# Patient Record
Sex: MALE | Race: WHITE | NOT HISPANIC OR LATINO | URBAN - METROPOLITAN AREA
[De-identification: names, ages, dates, MRNs, and addresses within clinical notes are randomized per-mention and may not be internally consistent; named-entity substitution may affect disease eponyms.]

---

## 2019-12-02 ENCOUNTER — EMERGENCY (EMERGENCY)
Facility: HOSPITAL | Age: 74
LOS: 1 days | Discharge: ANOTHER HOSPITAL | End: 2019-12-02
Attending: EMERGENCY MEDICINE | Admitting: EMERGENCY MEDICINE
Payer: MEDICARE

## 2019-12-02 VITALS
DIASTOLIC BLOOD PRESSURE: 62 MMHG | OXYGEN SATURATION: 95 % | HEART RATE: 75 BPM | SYSTOLIC BLOOD PRESSURE: 132 MMHG | RESPIRATION RATE: 16 BRPM

## 2019-12-02 VITALS
HEART RATE: 72 BPM | OXYGEN SATURATION: 94 % | HEIGHT: 67 IN | TEMPERATURE: 98 F | RESPIRATION RATE: 18 BRPM | WEIGHT: 184.09 LBS | DIASTOLIC BLOOD PRESSURE: 74 MMHG | SYSTOLIC BLOOD PRESSURE: 135 MMHG

## 2019-12-02 DIAGNOSIS — Z98.89 OTHER SPECIFIED POSTPROCEDURAL STATES: Chronic | ICD-10-CM

## 2019-12-02 PROCEDURE — 99284 EMERGENCY DEPT VISIT MOD MDM: CPT | Mod: 25

## 2019-12-02 PROCEDURE — 99284 EMERGENCY DEPT VISIT MOD MDM: CPT

## 2019-12-02 PROCEDURE — 74176 CT ABD & PELVIS W/O CONTRAST: CPT

## 2019-12-02 PROCEDURE — 74176 CT ABD & PELVIS W/O CONTRAST: CPT | Mod: 26

## 2019-12-02 NOTE — ED PROVIDER NOTE - PATIENT PORTAL LINK FT
You can access the FollowMyHealth Patient Portal offered by [facility] by registering at the following website: [email or web address]. By joining [facility]’s FollowMyHealth portal, you will also be able to view your health information using other applications (apps) compatible with our system.

## 2019-12-02 NOTE — ED PROVIDER NOTE - PHYSICAL EXAMINATION
Gen:  alert, awake, no acute distress  HEENT:  atraumatic head, airway clear, pupils equal and round  CV:  rrr, nl S1, S2, no m/r/g  Pulm:  lungs CTA b/l  Abd: s/nt/nd, +BS, no stool in rectal vault  Ext:  moving all extremities  Neuro:  grossly intact, no focal deficits  Skin:  clear, dry, intact  Psych: AOx3, normal affect, no apparent risk to self or others

## 2019-12-02 NOTE — ED ADULT NURSE REASSESSMENT NOTE - NS ED NURSE REASSESS COMMENT FT1
Spoke with pt daughter in regards to pt plan of care and status, pt daughter verbalized understanding of information. Pt awaiting CT results at this time. Will continue to monitor.

## 2019-12-02 NOTE — ED PROVIDER NOTE - OBJECTIVE STATEMENT
pt sent from [facility] for 10 day h/o inability to move his bowels.  pt at [facility] because of R foot osteomyelitis.  pt denies any vomiting, is passing gas, was started on lactulose and milk of magnesia this evening without any BM yet.  pt denies having any fevers or chills. reports good appetite.  SNF notes state a concern that abdomen was "rigid".

## 2019-12-02 NOTE — ED ADULT NURSE NOTE - OBJECTIVE STATEMENT
Pt is alert, brought to the ER from [facility] due to no bowel movement for 10 days despite laxative and softener. Denies nausea and vomiting.

## 2019-12-02 NOTE — ED ADULT NURSE REASSESSMENT NOTE - NS ED NURSE REASSESS COMMENT FT1
Pt came in to the ER with on going Vancomycin 1 Gm IV drip via dial a flow thru PICC Line. on the left arm.

## 2019-12-02 NOTE — ED PROVIDER NOTE - NSFOLLOWUPINSTRUCTIONS_ED_ALL_ED_FT
-- Follow up with your primary care physician in 48 hours.    -- Start Golytle as prescribed.    -- Return to the ER for worsening or persistent symptoms, and/or ANY NEW OR CONCERNING SYMPTOMS.    -- If you have difficulty following up, please call: [phone] to obtain a [facility] doctor or specialist who takes your insurance in your area. -- Follow up with your primary care physician in 48 hours.    -- Start Gavilyte as prescribed.    -- Return to the ER for worsening or persistent symptoms, and/or ANY NEW OR CONCERNING SYMPTOMS.    -- If you have difficulty following up, please call: [phone] to obtain a [facility] doctor or specialist who takes your insurance in your area.

## 2019-12-02 NOTE — ED ADULT NURSE NOTE - PSH
History of appendectomy  17 years ago  History of right knee surgery  " Removal of right knee tumor" ,  1959

## 2019-12-02 NOTE — ED ADULT NURSE NOTE - PMH
Anemia    Carpal tunnel syndrome on both sides    Diabetic peripheral neuropathy    History of pancreatitis  ~ 1990, 1992  History of ventral hernia    Hyperlipidemia    Hypertension    Obesity (BMI 30-39.9)    Radial styloid tenosynovitis    Sleep apnea    Spinal stenosis of lumbar region    Type 2 diabetes mellitus

## 2019-12-07 DIAGNOSIS — R10.9 UNSPECIFIED ABDOMINAL PAIN: ICD-10-CM

## 2020-01-01 ENCOUNTER — INPATIENT (INPATIENT)
Facility: HOSPITAL | Age: 75
LOS: 8 days | Discharge: NURSING, REHAB OR LONG-TERM CARE | DRG: 871 | End: 2020-12-11
Attending: HOSPITALIST | Admitting: STUDENT IN AN ORGANIZED HEALTH CARE EDUCATION/TRAINING PROGRAM
Payer: MEDICARE

## 2020-01-01 ENCOUNTER — TRANSCRIPTION ENCOUNTER (OUTPATIENT)
Age: 75
End: 2020-01-01

## 2020-01-01 VITALS
DIASTOLIC BLOOD PRESSURE: 72 MMHG | WEIGHT: 138.89 LBS | RESPIRATION RATE: 20 BRPM | TEMPERATURE: 97 F | HEART RATE: 93 BPM | SYSTOLIC BLOOD PRESSURE: 131 MMHG | OXYGEN SATURATION: 98 %

## 2020-01-01 VITALS
DIASTOLIC BLOOD PRESSURE: 70 MMHG | HEART RATE: 95 BPM | TEMPERATURE: 99 F | OXYGEN SATURATION: 95 % | SYSTOLIC BLOOD PRESSURE: 117 MMHG | RESPIRATION RATE: 18 BRPM

## 2020-01-01 DIAGNOSIS — I10 ESSENTIAL (PRIMARY) HYPERTENSION: ICD-10-CM

## 2020-01-01 DIAGNOSIS — M48.00 SPINAL STENOSIS, SITE UNSPECIFIED: ICD-10-CM

## 2020-01-01 DIAGNOSIS — J96.01 ACUTE RESPIRATORY FAILURE WITH HYPOXIA: ICD-10-CM

## 2020-01-01 DIAGNOSIS — G45.9 TRANSIENT CEREBRAL ISCHEMIC ATTACK, UNSPECIFIED: ICD-10-CM

## 2020-01-01 DIAGNOSIS — J18.9 PNEUMONIA, UNSPECIFIED ORGANISM: ICD-10-CM

## 2020-01-01 DIAGNOSIS — Z29.9 ENCOUNTER FOR PROPHYLACTIC MEASURES, UNSPECIFIED: ICD-10-CM

## 2020-01-01 DIAGNOSIS — I26.99 OTHER PULMONARY EMBOLISM WITHOUT ACUTE COR PULMONALE: ICD-10-CM

## 2020-01-01 DIAGNOSIS — E78.5 HYPERLIPIDEMIA, UNSPECIFIED: ICD-10-CM

## 2020-01-01 DIAGNOSIS — E11.9 TYPE 2 DIABETES MELLITUS WITHOUT COMPLICATIONS: ICD-10-CM

## 2020-01-01 DIAGNOSIS — K21.9 GASTRO-ESOPHAGEAL REFLUX DISEASE WITHOUT ESOPHAGITIS: ICD-10-CM

## 2020-01-01 DIAGNOSIS — Z98.89 OTHER SPECIFIED POSTPROCEDURAL STATES: Chronic | ICD-10-CM

## 2020-01-01 LAB
-  COAGULASE NEGATIVE STAPHYLOCOCCUS: SIGNIFICANT CHANGE UP
A1C WITH ESTIMATED AVERAGE GLUCOSE RESULT: 6 % — HIGH (ref 4–5.6)
ALBUMIN SERPL ELPH-MCNC: 2.4 G/DL — LOW (ref 3.3–5)
ALBUMIN SERPL ELPH-MCNC: 2.5 G/DL — LOW (ref 3.3–5)
ALBUMIN SERPL ELPH-MCNC: 2.7 G/DL — LOW (ref 3.3–5)
ALBUMIN SERPL ELPH-MCNC: 2.8 G/DL — LOW (ref 3.3–5)
ALBUMIN SERPL ELPH-MCNC: 3.6 G/DL — SIGNIFICANT CHANGE UP (ref 3.3–5)
ALP SERPL-CCNC: 59 U/L — SIGNIFICANT CHANGE UP (ref 40–120)
ALP SERPL-CCNC: 60 U/L — SIGNIFICANT CHANGE UP (ref 40–120)
ALP SERPL-CCNC: 62 U/L — SIGNIFICANT CHANGE UP (ref 40–120)
ALP SERPL-CCNC: 65 U/L — SIGNIFICANT CHANGE UP (ref 40–120)
ALP SERPL-CCNC: 70 U/L — SIGNIFICANT CHANGE UP (ref 40–120)
ALP SERPL-CCNC: 75 U/L — SIGNIFICANT CHANGE UP (ref 40–120)
ALP SERPL-CCNC: 92 U/L — SIGNIFICANT CHANGE UP (ref 40–120)
ALT FLD-CCNC: 135 U/L — HIGH (ref 12–78)
ALT FLD-CCNC: 190 U/L — HIGH (ref 12–78)
ALT FLD-CCNC: 435 U/L — HIGH (ref 12–78)
ALT FLD-CCNC: 50 U/L — SIGNIFICANT CHANGE UP (ref 12–78)
ALT FLD-CCNC: 59 U/L — SIGNIFICANT CHANGE UP (ref 12–78)
ALT FLD-CCNC: 77 U/L — SIGNIFICANT CHANGE UP (ref 12–78)
ALT FLD-CCNC: 95 U/L — HIGH (ref 12–78)
ANION GAP SERPL CALC-SCNC: 2 MMOL/L — LOW (ref 5–17)
ANION GAP SERPL CALC-SCNC: 3 MMOL/L — LOW (ref 5–17)
ANION GAP SERPL CALC-SCNC: 3 MMOL/L — LOW (ref 5–17)
ANION GAP SERPL CALC-SCNC: 4 MMOL/L — LOW (ref 5–17)
ANION GAP SERPL CALC-SCNC: 4 MMOL/L — LOW (ref 5–17)
ANION GAP SERPL CALC-SCNC: 5 MMOL/L — SIGNIFICANT CHANGE UP (ref 5–17)
ANION GAP SERPL CALC-SCNC: 5 MMOL/L — SIGNIFICANT CHANGE UP (ref 5–17)
ANION GAP SERPL CALC-SCNC: 6 MMOL/L — SIGNIFICANT CHANGE UP (ref 5–17)
ANION GAP SERPL CALC-SCNC: 7 MMOL/L — SIGNIFICANT CHANGE UP (ref 5–17)
ANION GAP SERPL CALC-SCNC: 9 MMOL/L — SIGNIFICANT CHANGE UP (ref 5–17)
ANISOCYTOSIS BLD QL: SLIGHT — SIGNIFICANT CHANGE UP
APPEARANCE UR: CLEAR — SIGNIFICANT CHANGE UP
APTT BLD: 24.8 SEC — LOW (ref 27.5–35.5)
APTT BLD: 25.4 SEC — LOW (ref 27.5–35.5)
AST SERPL-CCNC: 15 U/L — SIGNIFICANT CHANGE UP (ref 15–37)
AST SERPL-CCNC: 16 U/L — SIGNIFICANT CHANGE UP (ref 15–37)
AST SERPL-CCNC: 197 U/L — HIGH (ref 15–37)
AST SERPL-CCNC: 20 U/L — SIGNIFICANT CHANGE UP (ref 15–37)
AST SERPL-CCNC: 22 U/L — SIGNIFICANT CHANGE UP (ref 15–37)
AST SERPL-CCNC: 22 U/L — SIGNIFICANT CHANGE UP (ref 15–37)
AST SERPL-CCNC: 34 U/L — SIGNIFICANT CHANGE UP (ref 15–37)
BASE EXCESS BLDA CALC-SCNC: 13.4 MMOL/L — HIGH (ref -2–2)
BASE EXCESS BLDA CALC-SCNC: 8.7 MMOL/L — HIGH (ref -2–2)
BASOPHILS # BLD AUTO: 0.01 K/UL — SIGNIFICANT CHANGE UP (ref 0–0.2)
BASOPHILS # BLD AUTO: 0.01 K/UL — SIGNIFICANT CHANGE UP (ref 0–0.2)
BASOPHILS # BLD AUTO: 0.02 K/UL — SIGNIFICANT CHANGE UP (ref 0–0.2)
BASOPHILS # BLD AUTO: 0.02 K/UL — SIGNIFICANT CHANGE UP (ref 0–0.2)
BASOPHILS NFR BLD AUTO: 0.1 % — SIGNIFICANT CHANGE UP (ref 0–2)
BASOPHILS NFR BLD AUTO: 0.2 % — SIGNIFICANT CHANGE UP (ref 0–2)
BILIRUB DIRECT SERPL-MCNC: 0.4 MG/DL — HIGH (ref 0.05–0.2)
BILIRUB INDIRECT FLD-MCNC: 0.4 MG/DL — SIGNIFICANT CHANGE UP (ref 0.2–1)
BILIRUB SERPL-MCNC: 0.6 MG/DL — SIGNIFICANT CHANGE UP (ref 0.2–1.2)
BILIRUB SERPL-MCNC: 0.6 MG/DL — SIGNIFICANT CHANGE UP (ref 0.2–1.2)
BILIRUB SERPL-MCNC: 0.7 MG/DL — SIGNIFICANT CHANGE UP (ref 0.2–1.2)
BILIRUB SERPL-MCNC: 0.8 MG/DL — SIGNIFICANT CHANGE UP (ref 0.2–1.2)
BILIRUB UR-MCNC: NEGATIVE — SIGNIFICANT CHANGE UP
BLOOD GAS COMMENTS ARTERIAL: SIGNIFICANT CHANGE UP
BLOOD GAS COMMENTS, VENOUS: SIGNIFICANT CHANGE UP
BLOOD GAS COMMENTS, VENOUS: SIGNIFICANT CHANGE UP
BUN SERPL-MCNC: 10 MG/DL — SIGNIFICANT CHANGE UP (ref 7–23)
BUN SERPL-MCNC: 11 MG/DL — SIGNIFICANT CHANGE UP (ref 7–23)
BUN SERPL-MCNC: 16 MG/DL — SIGNIFICANT CHANGE UP (ref 7–23)
BUN SERPL-MCNC: 24 MG/DL — HIGH (ref 7–23)
BUN SERPL-MCNC: 52 MG/DL — HIGH (ref 7–23)
BUN SERPL-MCNC: 55 MG/DL — HIGH (ref 7–23)
BUN SERPL-MCNC: 57 MG/DL — HIGH (ref 7–23)
BUN SERPL-MCNC: 63 MG/DL — HIGH (ref 7–23)
BUN SERPL-MCNC: 8 MG/DL — SIGNIFICANT CHANGE UP (ref 7–23)
BUN SERPL-MCNC: 9 MG/DL — SIGNIFICANT CHANGE UP (ref 7–23)
CALCIUM SERPL-MCNC: 7.4 MG/DL — LOW (ref 8.5–10.1)
CALCIUM SERPL-MCNC: 7.7 MG/DL — LOW (ref 8.5–10.1)
CALCIUM SERPL-MCNC: 7.9 MG/DL — LOW (ref 8.5–10.1)
CALCIUM SERPL-MCNC: 8.1 MG/DL — LOW (ref 8.5–10.1)
CALCIUM SERPL-MCNC: 8.2 MG/DL — LOW (ref 8.5–10.1)
CALCIUM SERPL-MCNC: 8.3 MG/DL — LOW (ref 8.5–10.1)
CALCIUM SERPL-MCNC: 8.4 MG/DL — LOW (ref 8.5–10.1)
CALCIUM SERPL-MCNC: 8.5 MG/DL — SIGNIFICANT CHANGE UP (ref 8.5–10.1)
CALCIUM SERPL-MCNC: 8.6 MG/DL — SIGNIFICANT CHANGE UP (ref 8.5–10.1)
CALCIUM SERPL-MCNC: 8.8 MG/DL — SIGNIFICANT CHANGE UP (ref 8.5–10.1)
CHLORIDE SERPL-SCNC: 100 MMOL/L — SIGNIFICANT CHANGE UP (ref 96–108)
CHLORIDE SERPL-SCNC: 100 MMOL/L — SIGNIFICANT CHANGE UP (ref 96–108)
CHLORIDE SERPL-SCNC: 95 MMOL/L — LOW (ref 96–108)
CHLORIDE SERPL-SCNC: 96 MMOL/L — SIGNIFICANT CHANGE UP (ref 96–108)
CHLORIDE SERPL-SCNC: 97 MMOL/L — SIGNIFICANT CHANGE UP (ref 96–108)
CHLORIDE SERPL-SCNC: 98 MMOL/L — SIGNIFICANT CHANGE UP (ref 96–108)
CHLORIDE SERPL-SCNC: 99 MMOL/L — SIGNIFICANT CHANGE UP (ref 96–108)
CK MB BLD-MCNC: 10 % — HIGH (ref 0–3.5)
CK MB BLD-MCNC: 10 % — HIGH (ref 0–3.5)
CK MB BLD-MCNC: 12.9 % — HIGH (ref 0–3.5)
CK MB CFR SERPL CALC: 1.8 NG/ML — SIGNIFICANT CHANGE UP (ref 0–3.6)
CK MB CFR SERPL CALC: 1.9 NG/ML — SIGNIFICANT CHANGE UP (ref 0–3.6)
CK MB CFR SERPL CALC: 2 NG/ML — SIGNIFICANT CHANGE UP (ref 0–3.6)
CK MB CFR SERPL CALC: 3.1 NG/ML — SIGNIFICANT CHANGE UP (ref 0–3.6)
CK SERPL-CCNC: 14 U/L — LOW (ref 26–308)
CK SERPL-CCNC: 20 U/L — LOW (ref 26–308)
CK SERPL-CCNC: 31 U/L — SIGNIFICANT CHANGE UP (ref 26–308)
CK SERPL-CCNC: 47 U/L — SIGNIFICANT CHANGE UP (ref 26–308)
CO2 SERPL-SCNC: 32 MMOL/L — HIGH (ref 22–31)
CO2 SERPL-SCNC: 32 MMOL/L — HIGH (ref 22–31)
CO2 SERPL-SCNC: 33 MMOL/L — HIGH (ref 22–31)
CO2 SERPL-SCNC: 34 MMOL/L — HIGH (ref 22–31)
CO2 SERPL-SCNC: 36 MMOL/L — HIGH (ref 22–31)
CO2 SERPL-SCNC: 38 MMOL/L — HIGH (ref 22–31)
CO2 SERPL-SCNC: 39 MMOL/L — HIGH (ref 22–31)
CO2 SERPL-SCNC: 40 MMOL/L — HIGH (ref 22–31)
CO2 SERPL-SCNC: 40 MMOL/L — HIGH (ref 22–31)
CO2 SERPL-SCNC: 42 MMOL/L — HIGH (ref 22–31)
COLOR SPEC: YELLOW — SIGNIFICANT CHANGE UP
CREAT SERPL-MCNC: 0.23 MG/DL — LOW (ref 0.5–1.3)
CREAT SERPL-MCNC: 0.25 MG/DL — LOW (ref 0.5–1.3)
CREAT SERPL-MCNC: 0.29 MG/DL — LOW (ref 0.5–1.3)
CREAT SERPL-MCNC: 0.3 MG/DL — LOW (ref 0.5–1.3)
CREAT SERPL-MCNC: 0.72 MG/DL — SIGNIFICANT CHANGE UP (ref 0.5–1.3)
CREAT SERPL-MCNC: 0.73 MG/DL — SIGNIFICANT CHANGE UP (ref 0.5–1.3)
CREAT SERPL-MCNC: 0.78 MG/DL — SIGNIFICANT CHANGE UP (ref 0.5–1.3)
CREAT SERPL-MCNC: 0.85 MG/DL — SIGNIFICANT CHANGE UP (ref 0.5–1.3)
CULTURE RESULTS: SIGNIFICANT CHANGE UP
DIFF PNL FLD: NEGATIVE — SIGNIFICANT CHANGE UP
EOSINOPHIL # BLD AUTO: 0 K/UL — SIGNIFICANT CHANGE UP (ref 0–0.5)
EOSINOPHIL # BLD AUTO: 0.01 K/UL — SIGNIFICANT CHANGE UP (ref 0–0.5)
EOSINOPHIL # BLD AUTO: 0.2 K/UL — SIGNIFICANT CHANGE UP (ref 0–0.5)
EOSINOPHIL # BLD AUTO: 0.22 K/UL — SIGNIFICANT CHANGE UP (ref 0–0.5)
EOSINOPHIL NFR BLD AUTO: 0 % — SIGNIFICANT CHANGE UP (ref 0–6)
EOSINOPHIL NFR BLD AUTO: 0.1 % — SIGNIFICANT CHANGE UP (ref 0–6)
EOSINOPHIL NFR BLD AUTO: 2.5 % — SIGNIFICANT CHANGE UP (ref 0–6)
EOSINOPHIL NFR BLD AUTO: 2.7 % — SIGNIFICANT CHANGE UP (ref 0–6)
EPI CELLS # UR: SIGNIFICANT CHANGE UP
ESTIMATED AVERAGE GLUCOSE: 126 MG/DL — HIGH (ref 68–114)
FERRITIN SERPL-MCNC: 119 NG/ML — SIGNIFICANT CHANGE UP (ref 30–400)
FOLATE SERPL-MCNC: 9.4 NG/ML — SIGNIFICANT CHANGE UP
GLUCOSE SERPL-MCNC: 119 MG/DL — HIGH (ref 70–99)
GLUCOSE SERPL-MCNC: 130 MG/DL — HIGH (ref 70–99)
GLUCOSE SERPL-MCNC: 135 MG/DL — HIGH (ref 70–99)
GLUCOSE SERPL-MCNC: 140 MG/DL — HIGH (ref 70–99)
GLUCOSE SERPL-MCNC: 175 MG/DL — HIGH (ref 70–99)
GLUCOSE SERPL-MCNC: 237 MG/DL — HIGH (ref 70–99)
GLUCOSE SERPL-MCNC: 265 MG/DL — HIGH (ref 70–99)
GLUCOSE SERPL-MCNC: 324 MG/DL — HIGH (ref 70–99)
GLUCOSE SERPL-MCNC: 64 MG/DL — LOW (ref 70–99)
GLUCOSE SERPL-MCNC: 95 MG/DL — SIGNIFICANT CHANGE UP (ref 70–99)
GLUCOSE UR QL: NEGATIVE — SIGNIFICANT CHANGE UP
GRAM STN FLD: SIGNIFICANT CHANGE UP
HCO3 BLDA-SCNC: 32 MMOL/L — HIGH (ref 23–27)
HCO3 BLDA-SCNC: 36 MMOL/L — HIGH (ref 23–27)
HCT VFR BLD CALC: 25.7 % — LOW (ref 39–50)
HCT VFR BLD CALC: 26.2 % — LOW (ref 39–50)
HCT VFR BLD CALC: 27.4 % — LOW (ref 39–50)
HCT VFR BLD CALC: 29.9 % — LOW (ref 39–50)
HCT VFR BLD CALC: 30 % — LOW (ref 39–50)
HCT VFR BLD CALC: 30 % — LOW (ref 39–50)
HCT VFR BLD CALC: 31.4 % — LOW (ref 39–50)
HCT VFR BLD CALC: 38 % — LOW (ref 39–50)
HCT VFR BLD CALC: 38.4 % — LOW (ref 39–50)
HCT VFR BLD CALC: 41 % — SIGNIFICANT CHANGE UP (ref 39–50)
HGB BLD-MCNC: 11.4 G/DL — LOW (ref 13–17)
HGB BLD-MCNC: 11.9 G/DL — LOW (ref 13–17)
HGB BLD-MCNC: 12.5 G/DL — LOW (ref 13–17)
HGB BLD-MCNC: 7.8 G/DL — LOW (ref 13–17)
HGB BLD-MCNC: 7.8 G/DL — LOW (ref 13–17)
HGB BLD-MCNC: 8.3 G/DL — LOW (ref 13–17)
HGB BLD-MCNC: 8.7 G/DL — LOW (ref 13–17)
HGB BLD-MCNC: 8.8 G/DL — LOW (ref 13–17)
HGB BLD-MCNC: 9.2 G/DL — LOW (ref 13–17)
HGB BLD-MCNC: 9.5 G/DL — LOW (ref 13–17)
HOROWITZ INDEX BLDA+IHG-RTO: 100 — SIGNIFICANT CHANGE UP
HOROWITZ INDEX BLDA+IHG-RTO: 24 — SIGNIFICANT CHANGE UP
HOROWITZ INDEX BLDA+IHG-RTO: 25 — SIGNIFICANT CHANGE UP
HOROWITZ INDEX BLDV+IHG-RTO: 21 — SIGNIFICANT CHANGE UP
HOROWITZ INDEX BLDV+IHG-RTO: 21 — SIGNIFICANT CHANGE UP
IMM GRANULOCYTES NFR BLD AUTO: 0.4 % — SIGNIFICANT CHANGE UP (ref 0–1.5)
IMM GRANULOCYTES NFR BLD AUTO: 0.4 % — SIGNIFICANT CHANGE UP (ref 0–1.5)
IMM GRANULOCYTES NFR BLD AUTO: 0.7 % — SIGNIFICANT CHANGE UP (ref 0–1.5)
IMM GRANULOCYTES NFR BLD AUTO: 1.1 % — SIGNIFICANT CHANGE UP (ref 0–1.5)
INR BLD: 1.04 RATIO — SIGNIFICANT CHANGE UP (ref 0.88–1.16)
IRON SATN MFR SERPL: 15 % — LOW (ref 16–55)
IRON SATN MFR SERPL: 53 UG/DL — SIGNIFICANT CHANGE UP (ref 45–165)
KETONES UR-MCNC: NEGATIVE — SIGNIFICANT CHANGE UP
LACTATE SERPL-SCNC: 1.8 MMOL/L — SIGNIFICANT CHANGE UP (ref 0.7–2)
LACTATE SERPL-SCNC: 2.7 MMOL/L — HIGH (ref 0.7–2)
LEGIONELLA AG UR QL: NEGATIVE — SIGNIFICANT CHANGE UP
LEUKOCYTE ESTERASE UR-ACNC: ABNORMAL
LYMPHOCYTES # BLD AUTO: 0.63 K/UL — LOW (ref 1–3.3)
LYMPHOCYTES # BLD AUTO: 0.7 K/UL — LOW (ref 1–3.3)
LYMPHOCYTES # BLD AUTO: 0.8 K/UL — LOW (ref 1–3.3)
LYMPHOCYTES # BLD AUTO: 1.58 K/UL — SIGNIFICANT CHANGE UP (ref 1–3.3)
LYMPHOCYTES # BLD AUTO: 12 % — LOW (ref 13–44)
LYMPHOCYTES # BLD AUTO: 4 % — LOW (ref 13–44)
LYMPHOCYTES # BLD AUTO: 8.8 % — LOW (ref 13–44)
LYMPHOCYTES # BLD AUTO: 9.8 % — LOW (ref 13–44)
MACROCYTES BLD QL: SLIGHT — SIGNIFICANT CHANGE UP
MAGNESIUM SERPL-MCNC: 1.8 MG/DL — SIGNIFICANT CHANGE UP (ref 1.6–2.6)
MAGNESIUM SERPL-MCNC: 1.9 MG/DL — SIGNIFICANT CHANGE UP (ref 1.6–2.6)
MAGNESIUM SERPL-MCNC: 2 MG/DL — SIGNIFICANT CHANGE UP (ref 1.6–2.6)
MAGNESIUM SERPL-MCNC: 2.1 MG/DL — SIGNIFICANT CHANGE UP (ref 1.6–2.6)
MAGNESIUM SERPL-MCNC: 2.1 MG/DL — SIGNIFICANT CHANGE UP (ref 1.6–2.6)
MANUAL SMEAR VERIFICATION: SIGNIFICANT CHANGE UP
MCHC RBC-ENTMCNC: 29 GM/DL — LOW (ref 32–36)
MCHC RBC-ENTMCNC: 29.3 GM/DL — LOW (ref 32–36)
MCHC RBC-ENTMCNC: 29.3 GM/DL — LOW (ref 32–36)
MCHC RBC-ENTMCNC: 29.7 GM/DL — LOW (ref 32–36)
MCHC RBC-ENTMCNC: 29.8 GM/DL — LOW (ref 32–36)
MCHC RBC-ENTMCNC: 30.3 GM/DL — LOW (ref 32–36)
MCHC RBC-ENTMCNC: 30.4 GM/DL — LOW (ref 32–36)
MCHC RBC-ENTMCNC: 30.5 GM/DL — LOW (ref 32–36)
MCHC RBC-ENTMCNC: 30.7 PG — SIGNIFICANT CHANGE UP (ref 27–34)
MCHC RBC-ENTMCNC: 31 PG — SIGNIFICANT CHANGE UP (ref 27–34)
MCHC RBC-ENTMCNC: 31 PG — SIGNIFICANT CHANGE UP (ref 27–34)
MCHC RBC-ENTMCNC: 31.3 GM/DL — LOW (ref 32–36)
MCHC RBC-ENTMCNC: 31.3 PG — SIGNIFICANT CHANGE UP (ref 27–34)
MCHC RBC-ENTMCNC: 31.5 PG — SIGNIFICANT CHANGE UP (ref 27–34)
MCHC RBC-ENTMCNC: 31.8 GM/DL — LOW (ref 32–36)
MCHC RBC-ENTMCNC: 31.8 PG — SIGNIFICANT CHANGE UP (ref 27–34)
MCHC RBC-ENTMCNC: 31.9 PG — SIGNIFICANT CHANGE UP (ref 27–34)
MCHC RBC-ENTMCNC: 32.2 PG — SIGNIFICANT CHANGE UP (ref 27–34)
MCV RBC AUTO: 100.3 FL — HIGH (ref 80–100)
MCV RBC AUTO: 102.7 FL — HIGH (ref 80–100)
MCV RBC AUTO: 103.6 FL — HIGH (ref 80–100)
MCV RBC AUTO: 104.5 FL — HIGH (ref 80–100)
MCV RBC AUTO: 104.6 FL — HIGH (ref 80–100)
MCV RBC AUTO: 105.2 FL — HIGH (ref 80–100)
MCV RBC AUTO: 105.4 FL — HIGH (ref 80–100)
MCV RBC AUTO: 105.7 FL — HIGH (ref 80–100)
MCV RBC AUTO: 106.8 FL — HIGH (ref 80–100)
MCV RBC AUTO: 107 FL — HIGH (ref 80–100)
METHOD TYPE: SIGNIFICANT CHANGE UP
MONOCYTES # BLD AUTO: 0.66 K/UL — SIGNIFICANT CHANGE UP (ref 0–0.9)
MONOCYTES # BLD AUTO: 0.67 K/UL — SIGNIFICANT CHANGE UP (ref 0–0.9)
MONOCYTES # BLD AUTO: 0.8 K/UL — SIGNIFICANT CHANGE UP (ref 0–0.9)
MONOCYTES # BLD AUTO: 1.13 K/UL — HIGH (ref 0–0.9)
MONOCYTES NFR BLD AUTO: 4.2 % — SIGNIFICANT CHANGE UP (ref 2–14)
MONOCYTES NFR BLD AUTO: 8.5 % — SIGNIFICANT CHANGE UP (ref 2–14)
MONOCYTES NFR BLD AUTO: 8.6 % — SIGNIFICANT CHANGE UP (ref 2–14)
MONOCYTES NFR BLD AUTO: 9.8 % — SIGNIFICANT CHANGE UP (ref 2–14)
MRSA PCR RESULT.: SIGNIFICANT CHANGE UP
NEUTROPHILS # BLD AUTO: 10.38 K/UL — HIGH (ref 1.8–7.4)
NEUTROPHILS # BLD AUTO: 14.37 K/UL — HIGH (ref 1.8–7.4)
NEUTROPHILS # BLD AUTO: 6.31 K/UL — SIGNIFICANT CHANGE UP (ref 1.8–7.4)
NEUTROPHILS # BLD AUTO: 6.32 K/UL — SIGNIFICANT CHANGE UP (ref 1.8–7.4)
NEUTROPHILS NFR BLD AUTO: 77.2 % — HIGH (ref 43–77)
NEUTROPHILS NFR BLD AUTO: 78.4 % — HIGH (ref 43–77)
NEUTROPHILS NFR BLD AUTO: 79.7 % — HIGH (ref 43–77)
NEUTROPHILS NFR BLD AUTO: 90.6 % — HIGH (ref 43–77)
NITRITE UR-MCNC: NEGATIVE — SIGNIFICANT CHANGE UP
NRBC # BLD: 0 /100 WBCS — SIGNIFICANT CHANGE UP (ref 0–0)
NT-PROBNP SERPL-SCNC: HIGH PG/ML (ref 0–450)
OB PNL STL: NEGATIVE — SIGNIFICANT CHANGE UP
OB PNL STL: NEGATIVE — SIGNIFICANT CHANGE UP
ORGANISM # SPEC MICROSCOPIC CNT: SIGNIFICANT CHANGE UP
ORGANISM # SPEC MICROSCOPIC CNT: SIGNIFICANT CHANGE UP
PCO2 BLDA: 48 MMHG — HIGH (ref 32–46)
PCO2 BLDA: 94 MMHG — CRITICAL HIGH (ref 32–46)
PCO2 BLDA: >103 MMHG — CRITICAL HIGH (ref 32–46)
PCO2 BLDV: >103 MMHG — HIGH (ref 35–50)
PCO2 BLDV: >103 MMHG — HIGH (ref 35–50)
PCP SPEC-MCNC: SIGNIFICANT CHANGE UP
PH BLDA: 7.14 — CRITICAL LOW (ref 7.35–7.45)
PH BLDA: 7.26 — LOW (ref 7.35–7.45)
PH BLDA: 7.45 — SIGNIFICANT CHANGE UP (ref 7.35–7.45)
PH BLDV: 7.09 — CRITICAL LOW (ref 7.35–7.45)
PH BLDV: 7.1 — CRITICAL LOW (ref 7.35–7.45)
PH UR: 5 — SIGNIFICANT CHANGE UP (ref 5–8)
PHOSPHATE SERPL-MCNC: 1.2 MG/DL — LOW (ref 2.5–4.5)
PHOSPHATE SERPL-MCNC: 2.2 MG/DL — LOW (ref 2.5–4.5)
PHOSPHATE SERPL-MCNC: 2.3 MG/DL — LOW (ref 2.5–4.5)
PHOSPHATE SERPL-MCNC: 2.4 MG/DL — LOW (ref 2.5–4.5)
PHOSPHATE SERPL-MCNC: 2.4 MG/DL — LOW (ref 2.5–4.5)
PHOSPHATE SERPL-MCNC: 3.7 MG/DL — SIGNIFICANT CHANGE UP (ref 2.5–4.5)
PHOSPHATE SERPL-MCNC: 7.7 MG/DL — HIGH (ref 2.5–4.5)
PLAT MORPH BLD: SIGNIFICANT CHANGE UP
PLATELET # BLD AUTO: 135 K/UL — LOW (ref 150–400)
PLATELET # BLD AUTO: 173 K/UL — SIGNIFICANT CHANGE UP (ref 150–400)
PLATELET # BLD AUTO: 195 K/UL — SIGNIFICANT CHANGE UP (ref 150–400)
PLATELET # BLD AUTO: 196 K/UL — SIGNIFICANT CHANGE UP (ref 150–400)
PLATELET # BLD AUTO: 200 K/UL — SIGNIFICANT CHANGE UP (ref 150–400)
PLATELET # BLD AUTO: 201 K/UL — SIGNIFICANT CHANGE UP (ref 150–400)
PLATELET # BLD AUTO: 205 K/UL — SIGNIFICANT CHANGE UP (ref 150–400)
PLATELET # BLD AUTO: 292 K/UL — SIGNIFICANT CHANGE UP (ref 150–400)
PLATELET # BLD AUTO: 305 K/UL — SIGNIFICANT CHANGE UP (ref 150–400)
PLATELET # BLD AUTO: 314 K/UL — SIGNIFICANT CHANGE UP (ref 150–400)
PO2 BLDA: 259 MMHG — HIGH (ref 74–108)
PO2 BLDA: 66 MMHG — LOW (ref 74–108)
PO2 BLDA: 69 MMHG — LOW (ref 74–108)
PO2 BLDV: <44 MMHG — SIGNIFICANT CHANGE UP (ref 25–45)
PO2 BLDV: SIGNIFICANT CHANGE UP MMHG (ref 25–45)
POLYCHROMASIA BLD QL SMEAR: SLIGHT — SIGNIFICANT CHANGE UP
POTASSIUM SERPL-MCNC: 3.6 MMOL/L — SIGNIFICANT CHANGE UP (ref 3.5–5.3)
POTASSIUM SERPL-MCNC: 3.6 MMOL/L — SIGNIFICANT CHANGE UP (ref 3.5–5.3)
POTASSIUM SERPL-MCNC: 4.1 MMOL/L — SIGNIFICANT CHANGE UP (ref 3.5–5.3)
POTASSIUM SERPL-MCNC: 4.2 MMOL/L — SIGNIFICANT CHANGE UP (ref 3.5–5.3)
POTASSIUM SERPL-MCNC: 4.3 MMOL/L — SIGNIFICANT CHANGE UP (ref 3.5–5.3)
POTASSIUM SERPL-MCNC: 4.6 MMOL/L — SIGNIFICANT CHANGE UP (ref 3.5–5.3)
POTASSIUM SERPL-MCNC: 4.6 MMOL/L — SIGNIFICANT CHANGE UP (ref 3.5–5.3)
POTASSIUM SERPL-MCNC: 5.1 MMOL/L — SIGNIFICANT CHANGE UP (ref 3.5–5.3)
POTASSIUM SERPL-MCNC: 5.2 MMOL/L — SIGNIFICANT CHANGE UP (ref 3.5–5.3)
POTASSIUM SERPL-MCNC: 5.3 MMOL/L — SIGNIFICANT CHANGE UP (ref 3.5–5.3)
POTASSIUM SERPL-SCNC: 3.6 MMOL/L — SIGNIFICANT CHANGE UP (ref 3.5–5.3)
POTASSIUM SERPL-SCNC: 3.6 MMOL/L — SIGNIFICANT CHANGE UP (ref 3.5–5.3)
POTASSIUM SERPL-SCNC: 4.1 MMOL/L — SIGNIFICANT CHANGE UP (ref 3.5–5.3)
POTASSIUM SERPL-SCNC: 4.2 MMOL/L — SIGNIFICANT CHANGE UP (ref 3.5–5.3)
POTASSIUM SERPL-SCNC: 4.3 MMOL/L — SIGNIFICANT CHANGE UP (ref 3.5–5.3)
POTASSIUM SERPL-SCNC: 4.6 MMOL/L — SIGNIFICANT CHANGE UP (ref 3.5–5.3)
POTASSIUM SERPL-SCNC: 4.6 MMOL/L — SIGNIFICANT CHANGE UP (ref 3.5–5.3)
POTASSIUM SERPL-SCNC: 5.1 MMOL/L — SIGNIFICANT CHANGE UP (ref 3.5–5.3)
POTASSIUM SERPL-SCNC: 5.2 MMOL/L — SIGNIFICANT CHANGE UP (ref 3.5–5.3)
POTASSIUM SERPL-SCNC: 5.3 MMOL/L — SIGNIFICANT CHANGE UP (ref 3.5–5.3)
PROT SERPL-MCNC: 5.2 G/DL — LOW (ref 6–8.3)
PROT SERPL-MCNC: 5.3 G/DL — LOW (ref 6–8.3)
PROT SERPL-MCNC: 5.9 G/DL — LOW (ref 6–8.3)
PROT SERPL-MCNC: 6.2 G/DL — SIGNIFICANT CHANGE UP (ref 6–8.3)
PROT SERPL-MCNC: 7.2 G/DL — SIGNIFICANT CHANGE UP (ref 6–8.3)
PROT UR-MCNC: 15
PROTHROM AB SERPL-ACNC: 12.1 SEC — SIGNIFICANT CHANGE UP (ref 10.6–13.6)
RAPID RVP RESULT: SIGNIFICANT CHANGE UP
RBC # BLD: 2.48 M/UL — LOW (ref 4.2–5.8)
RBC # BLD: 2.49 M/UL — LOW (ref 4.2–5.8)
RBC # BLD: 2.6 M/UL — LOW (ref 4.2–5.8)
RBC # BLD: 2.81 M/UL — LOW (ref 4.2–5.8)
RBC # BLD: 2.87 M/UL — LOW (ref 4.2–5.8)
RBC # BLD: 2.97 M/UL — LOW (ref 4.2–5.8)
RBC # BLD: 2.98 M/UL — LOW (ref 4.2–5.8)
RBC # BLD: 3.59 M/UL — LOW (ref 4.2–5.8)
RBC # BLD: 3.7 M/UL — LOW (ref 4.2–5.8)
RBC # BLD: 3.92 M/UL — LOW (ref 4.2–5.8)
RBC # FLD: 15.2 % — HIGH (ref 10.3–14.5)
RBC # FLD: 15.2 % — HIGH (ref 10.3–14.5)
RBC # FLD: 15.6 % — HIGH (ref 10.3–14.5)
RBC # FLD: 15.6 % — HIGH (ref 10.3–14.5)
RBC # FLD: 15.8 % — HIGH (ref 10.3–14.5)
RBC # FLD: 15.9 % — HIGH (ref 10.3–14.5)
RBC # FLD: 16 % — HIGH (ref 10.3–14.5)
RBC BLD AUTO: ABNORMAL
S AUREUS DNA NOSE QL NAA+PROBE: DETECTED
S PNEUM AG UR QL: NEGATIVE — SIGNIFICANT CHANGE UP
SAO2 % BLDA: 100 % — HIGH (ref 92–96)
SAO2 % BLDA: 94 % — SIGNIFICANT CHANGE UP (ref 92–96)
SAO2 % BLDA: 94 % — SIGNIFICANT CHANGE UP (ref 92–96)
SAO2 % BLDV: 57 % — LOW (ref 67–88)
SAO2 % BLDV: 99 % — HIGH (ref 67–88)
SARS-COV-2 IGG SERPL QL IA: NEGATIVE — SIGNIFICANT CHANGE UP
SARS-COV-2 IGM SERPL IA-ACNC: 0 INDEX — SIGNIFICANT CHANGE UP
SARS-COV-2 RNA SPEC QL NAA+PROBE: SIGNIFICANT CHANGE UP
SODIUM SERPL-SCNC: 135 MMOL/L — SIGNIFICANT CHANGE UP (ref 135–145)
SODIUM SERPL-SCNC: 136 MMOL/L — SIGNIFICANT CHANGE UP (ref 135–145)
SODIUM SERPL-SCNC: 137 MMOL/L — SIGNIFICANT CHANGE UP (ref 135–145)
SODIUM SERPL-SCNC: 139 MMOL/L — SIGNIFICANT CHANGE UP (ref 135–145)
SODIUM SERPL-SCNC: 139 MMOL/L — SIGNIFICANT CHANGE UP (ref 135–145)
SODIUM SERPL-SCNC: 140 MMOL/L — SIGNIFICANT CHANGE UP (ref 135–145)
SODIUM SERPL-SCNC: 141 MMOL/L — SIGNIFICANT CHANGE UP (ref 135–145)
SP GR SPEC: 1.01 — SIGNIFICANT CHANGE UP (ref 1.01–1.02)
SPECIMEN SOURCE: SIGNIFICANT CHANGE UP
TIBC SERPL-MCNC: 353 UG/DL — SIGNIFICANT CHANGE UP (ref 220–430)
TROPONIN I SERPL-MCNC: 0.06 NG/ML — HIGH (ref 0.01–0.04)
TROPONIN I SERPL-MCNC: 0.06 NG/ML — HIGH (ref 0.01–0.04)
TROPONIN I SERPL-MCNC: 0.07 NG/ML — HIGH (ref 0.01–0.04)
TROPONIN I SERPL-MCNC: 0.29 NG/ML — HIGH (ref 0.01–0.04)
TROPONIN I SERPL-MCNC: 0.5 NG/ML — HIGH (ref 0.01–0.04)
TSH SERPL-MCNC: 2.53 UIU/ML — SIGNIFICANT CHANGE UP (ref 0.36–3.74)
TSH SERPL-MCNC: 2.6 UIU/ML — SIGNIFICANT CHANGE UP (ref 0.36–3.74)
UIBC SERPL-MCNC: 299 UG/DL — SIGNIFICANT CHANGE UP (ref 110–370)
UROBILINOGEN FLD QL: NEGATIVE — SIGNIFICANT CHANGE UP
VIT B12 SERPL-MCNC: 910 PG/ML — SIGNIFICANT CHANGE UP (ref 232–1245)
WBC # BLD: 10.27 K/UL — SIGNIFICANT CHANGE UP (ref 3.8–10.5)
WBC # BLD: 13.21 K/UL — HIGH (ref 3.8–10.5)
WBC # BLD: 14.2 K/UL — HIGH (ref 3.8–10.5)
WBC # BLD: 15.85 K/UL — HIGH (ref 3.8–10.5)
WBC # BLD: 23.97 K/UL — HIGH (ref 3.8–10.5)
WBC # BLD: 7.01 K/UL — SIGNIFICANT CHANGE UP (ref 3.8–10.5)
WBC # BLD: 7.92 K/UL — SIGNIFICANT CHANGE UP (ref 3.8–10.5)
WBC # BLD: 8.18 K/UL — SIGNIFICANT CHANGE UP (ref 3.8–10.5)
WBC # BLD: 8.87 K/UL — SIGNIFICANT CHANGE UP (ref 3.8–10.5)
WBC # BLD: 9.83 K/UL — SIGNIFICANT CHANGE UP (ref 3.8–10.5)
WBC # FLD AUTO: 10.27 K/UL — SIGNIFICANT CHANGE UP (ref 3.8–10.5)
WBC # FLD AUTO: 13.21 K/UL — HIGH (ref 3.8–10.5)
WBC # FLD AUTO: 14.2 K/UL — HIGH (ref 3.8–10.5)
WBC # FLD AUTO: 15.85 K/UL — HIGH (ref 3.8–10.5)
WBC # FLD AUTO: 23.97 K/UL — HIGH (ref 3.8–10.5)
WBC # FLD AUTO: 7.01 K/UL — SIGNIFICANT CHANGE UP (ref 3.8–10.5)
WBC # FLD AUTO: 7.92 K/UL — SIGNIFICANT CHANGE UP (ref 3.8–10.5)
WBC # FLD AUTO: 8.18 K/UL — SIGNIFICANT CHANGE UP (ref 3.8–10.5)
WBC # FLD AUTO: 8.87 K/UL — SIGNIFICANT CHANGE UP (ref 3.8–10.5)
WBC # FLD AUTO: 9.83 K/UL — SIGNIFICANT CHANGE UP (ref 3.8–10.5)
WBC UR QL: SIGNIFICANT CHANGE UP

## 2020-01-01 PROCEDURE — 83540 ASSAY OF IRON: CPT

## 2020-01-01 PROCEDURE — 99232 SBSQ HOSP IP/OBS MODERATE 35: CPT

## 2020-01-01 PROCEDURE — 74230 X-RAY XM SWLNG FUNCJ C+: CPT | Mod: 26

## 2020-01-01 PROCEDURE — 71275 CT ANGIOGRAPHY CHEST: CPT

## 2020-01-01 PROCEDURE — 99233 SBSQ HOSP IP/OBS HIGH 50: CPT | Mod: GC

## 2020-01-01 PROCEDURE — 87899 AGENT NOS ASSAY W/OPTIC: CPT

## 2020-01-01 PROCEDURE — 93970 EXTREMITY STUDY: CPT

## 2020-01-01 PROCEDURE — 99233 SBSQ HOSP IP/OBS HIGH 50: CPT

## 2020-01-01 PROCEDURE — 99291 CRITICAL CARE FIRST HOUR: CPT

## 2020-01-01 PROCEDURE — 92526 ORAL FUNCTION THERAPY: CPT

## 2020-01-01 PROCEDURE — 82728 ASSAY OF FERRITIN: CPT

## 2020-01-01 PROCEDURE — 93010 ELECTROCARDIOGRAM REPORT: CPT

## 2020-01-01 PROCEDURE — 92610 EVALUATE SWALLOWING FUNCTION: CPT

## 2020-01-01 PROCEDURE — 99291 CRITICAL CARE FIRST HOUR: CPT | Mod: 25

## 2020-01-01 PROCEDURE — 99285 EMERGENCY DEPT VISIT HI MDM: CPT

## 2020-01-01 PROCEDURE — 93306 TTE W/DOPPLER COMPLETE: CPT | Mod: 26

## 2020-01-01 PROCEDURE — 97162 PT EVAL MOD COMPLEX 30 MIN: CPT

## 2020-01-01 PROCEDURE — 96366 THER/PROPH/DIAG IV INF ADDON: CPT

## 2020-01-01 PROCEDURE — 80076 HEPATIC FUNCTION PANEL: CPT

## 2020-01-01 PROCEDURE — 96365 THER/PROPH/DIAG IV INF INIT: CPT

## 2020-01-01 PROCEDURE — 87641 MR-STAPH DNA AMP PROBE: CPT

## 2020-01-01 PROCEDURE — 0225U NFCT DS DNA&RNA 21 SARSCOV2: CPT

## 2020-01-01 PROCEDURE — A9698: CPT

## 2020-01-01 PROCEDURE — 93970 EXTREMITY STUDY: CPT | Mod: 26

## 2020-01-01 PROCEDURE — 80307 DRUG TEST PRSMV CHEM ANLYZR: CPT

## 2020-01-01 PROCEDURE — 74230 X-RAY XM SWLNG FUNCJ C+: CPT

## 2020-01-01 PROCEDURE — 84443 ASSAY THYROID STIM HORMONE: CPT

## 2020-01-01 PROCEDURE — 87086 URINE CULTURE/COLONY COUNT: CPT

## 2020-01-01 PROCEDURE — 97112 NEUROMUSCULAR REEDUCATION: CPT

## 2020-01-01 PROCEDURE — 71045 X-RAY EXAM CHEST 1 VIEW: CPT | Mod: 26

## 2020-01-01 PROCEDURE — 71275 CT ANGIOGRAPHY CHEST: CPT | Mod: 26

## 2020-01-01 PROCEDURE — 86769 SARS-COV-2 COVID-19 ANTIBODY: CPT

## 2020-01-01 PROCEDURE — 82553 CREATINE MB FRACTION: CPT

## 2020-01-01 PROCEDURE — 84145 PROCALCITONIN (PCT): CPT

## 2020-01-01 PROCEDURE — 87640 STAPH A DNA AMP PROBE: CPT

## 2020-01-01 PROCEDURE — 83735 ASSAY OF MAGNESIUM: CPT

## 2020-01-01 PROCEDURE — 85027 COMPLETE CBC AUTOMATED: CPT

## 2020-01-01 PROCEDURE — 87150 DNA/RNA AMPLIFIED PROBE: CPT

## 2020-01-01 PROCEDURE — 82550 ASSAY OF CK (CPK): CPT

## 2020-01-01 PROCEDURE — 84484 ASSAY OF TROPONIN QUANT: CPT

## 2020-01-01 PROCEDURE — 93306 TTE W/DOPPLER COMPLETE: CPT

## 2020-01-01 PROCEDURE — 81001 URINALYSIS AUTO W/SCOPE: CPT

## 2020-01-01 PROCEDURE — U0003: CPT

## 2020-01-01 PROCEDURE — 83036 HEMOGLOBIN GLYCOSYLATED A1C: CPT

## 2020-01-01 PROCEDURE — 82272 OCCULT BLD FECES 1-3 TESTS: CPT

## 2020-01-01 PROCEDURE — 93005 ELECTROCARDIOGRAM TRACING: CPT

## 2020-01-01 PROCEDURE — 85025 COMPLETE CBC W/AUTO DIFF WBC: CPT

## 2020-01-01 PROCEDURE — 97110 THERAPEUTIC EXERCISES: CPT

## 2020-01-01 PROCEDURE — 82962 GLUCOSE BLOOD TEST: CPT

## 2020-01-01 PROCEDURE — 87449 NOS EACH ORGANISM AG IA: CPT

## 2020-01-01 PROCEDURE — 94760 N-INVAS EAR/PLS OXIMETRY 1: CPT

## 2020-01-01 PROCEDURE — 93308 TTE F-UP OR LMTD: CPT

## 2020-01-01 PROCEDURE — 87040 BLOOD CULTURE FOR BACTERIA: CPT

## 2020-01-01 PROCEDURE — 82746 ASSAY OF FOLIC ACID SERUM: CPT

## 2020-01-01 PROCEDURE — 97530 THERAPEUTIC ACTIVITIES: CPT

## 2020-01-01 PROCEDURE — 82607 VITAMIN B-12: CPT

## 2020-01-01 PROCEDURE — 85379 FIBRIN DEGRADATION QUANT: CPT

## 2020-01-01 PROCEDURE — 99223 1ST HOSP IP/OBS HIGH 75: CPT | Mod: AI

## 2020-01-01 PROCEDURE — 83880 ASSAY OF NATRIURETIC PEPTIDE: CPT

## 2020-01-01 PROCEDURE — 99292 CRITICAL CARE ADDL 30 MIN: CPT

## 2020-01-01 PROCEDURE — 83605 ASSAY OF LACTIC ACID: CPT

## 2020-01-01 PROCEDURE — 36415 COLL VENOUS BLD VENIPUNCTURE: CPT

## 2020-01-01 PROCEDURE — 83550 IRON BINDING TEST: CPT

## 2020-01-01 PROCEDURE — 84100 ASSAY OF PHOSPHORUS: CPT

## 2020-01-01 PROCEDURE — 92611 MOTION FLUOROSCOPY/SWALLOW: CPT

## 2020-01-01 PROCEDURE — C8929: CPT

## 2020-01-01 PROCEDURE — 94660 CPAP INITIATION&MGMT: CPT

## 2020-01-01 PROCEDURE — 99239 HOSP IP/OBS DSCHRG MGMT >30: CPT

## 2020-01-01 PROCEDURE — 85730 THROMBOPLASTIN TIME PARTIAL: CPT

## 2020-01-01 PROCEDURE — 80053 COMPREHEN METABOLIC PANEL: CPT

## 2020-01-01 PROCEDURE — 80048 BASIC METABOLIC PNL TOTAL CA: CPT

## 2020-01-01 PROCEDURE — 85610 PROTHROMBIN TIME: CPT

## 2020-01-01 PROCEDURE — 82803 BLOOD GASES ANY COMBINATION: CPT

## 2020-01-01 PROCEDURE — 71045 X-RAY EXAM CHEST 1 VIEW: CPT

## 2020-01-01 RX ORDER — OMEPRAZOLE 10 MG/1
1 CAPSULE, DELAYED RELEASE ORAL
Qty: 0 | Refills: 0 | DISCHARGE

## 2020-01-01 RX ORDER — NOREPINEPHRINE BITARTRATE/D5W 8 MG/250ML
0.05 PLASTIC BAG, INJECTION (ML) INTRAVENOUS
Qty: 8 | Refills: 0 | Status: DISCONTINUED | OUTPATIENT
Start: 2020-01-01 | End: 2020-01-01

## 2020-01-01 RX ORDER — DEXTROSE 50 % IN WATER 50 %
25 SYRINGE (ML) INTRAVENOUS ONCE
Refills: 0 | Status: DISCONTINUED | OUTPATIENT
Start: 2020-01-01 | End: 2020-01-01

## 2020-01-01 RX ORDER — MAGNESIUM SULFATE 500 MG/ML
2 VIAL (ML) INJECTION ONCE
Refills: 0 | Status: COMPLETED | OUTPATIENT
Start: 2020-01-01 | End: 2020-01-01

## 2020-01-01 RX ORDER — DEXTROSE 50 % IN WATER 50 %
15 SYRINGE (ML) INTRAVENOUS ONCE
Refills: 0 | Status: DISCONTINUED | OUTPATIENT
Start: 2020-01-01 | End: 2020-01-01

## 2020-01-01 RX ORDER — SODIUM CHLORIDE 9 MG/ML
1000 INJECTION, SOLUTION INTRAVENOUS
Refills: 0 | Status: DISCONTINUED | OUTPATIENT
Start: 2020-01-01 | End: 2020-01-01

## 2020-01-01 RX ORDER — CLOPIDOGREL BISULFATE 75 MG/1
75 TABLET, FILM COATED ORAL DAILY
Refills: 0 | Status: DISCONTINUED | OUTPATIENT
Start: 2020-01-01 | End: 2020-01-01

## 2020-01-01 RX ORDER — PREGABALIN 225 MG/1
2 CAPSULE ORAL
Qty: 0 | Refills: 0 | DISCHARGE

## 2020-01-01 RX ORDER — GLUCAGON INJECTION, SOLUTION 0.5 MG/.1ML
1 INJECTION, SOLUTION SUBCUTANEOUS ONCE
Refills: 0 | Status: DISCONTINUED | OUTPATIENT
Start: 2020-01-01 | End: 2020-01-01

## 2020-01-01 RX ORDER — ENOXAPARIN SODIUM 100 MG/ML
40 INJECTION SUBCUTANEOUS DAILY
Refills: 0 | Status: DISCONTINUED | OUTPATIENT
Start: 2020-01-01 | End: 2020-01-01

## 2020-01-01 RX ORDER — POTASSIUM PHOSPHATE, MONOBASIC POTASSIUM PHOSPHATE, DIBASIC 236; 224 MG/ML; MG/ML
15 INJECTION, SOLUTION INTRAVENOUS
Refills: 0 | Status: COMPLETED | OUTPATIENT
Start: 2020-01-01 | End: 2020-01-01

## 2020-01-01 RX ORDER — INSULIN GLARGINE 100 [IU]/ML
5 INJECTION, SOLUTION SUBCUTANEOUS ONCE
Refills: 0 | Status: COMPLETED | OUTPATIENT
Start: 2020-01-01 | End: 2020-01-01

## 2020-01-01 RX ORDER — OXYCODONE HYDROCHLORIDE 5 MG/1
1 TABLET ORAL
Qty: 0 | Refills: 0 | DISCHARGE

## 2020-01-01 RX ORDER — PIPERACILLIN AND TAZOBACTAM 4; .5 G/20ML; G/20ML
3.38 INJECTION, POWDER, LYOPHILIZED, FOR SOLUTION INTRAVENOUS ONCE
Refills: 0 | Status: COMPLETED | OUTPATIENT
Start: 2020-01-01 | End: 2020-01-01

## 2020-01-01 RX ORDER — INSULIN LISPRO 100/ML
VIAL (ML) SUBCUTANEOUS EVERY 6 HOURS
Refills: 0 | Status: DISCONTINUED | OUTPATIENT
Start: 2020-01-01 | End: 2020-01-01

## 2020-01-01 RX ORDER — ROPINIROLE 8 MG/1
0.25 TABLET, FILM COATED, EXTENDED RELEASE ORAL
Refills: 0 | Status: DISCONTINUED | OUTPATIENT
Start: 2020-01-01 | End: 2020-01-01

## 2020-01-01 RX ORDER — INSULIN LISPRO 100/ML
VIAL (ML) SUBCUTANEOUS
Refills: 0 | Status: DISCONTINUED | OUTPATIENT
Start: 2020-01-01 | End: 2020-01-01

## 2020-01-01 RX ORDER — ROPINIROLE 8 MG/1
1 TABLET, FILM COATED, EXTENDED RELEASE ORAL
Qty: 0 | Refills: 0 | DISCHARGE

## 2020-01-01 RX ORDER — OXYCODONE HYDROCHLORIDE 5 MG/1
5 TABLET ORAL THREE TIMES A DAY
Refills: 0 | Status: DISCONTINUED | OUTPATIENT
Start: 2020-01-01 | End: 2020-01-01

## 2020-01-01 RX ORDER — MORPHINE SULFATE 50 MG/1
0 CAPSULE, EXTENDED RELEASE ORAL
Qty: 0 | Refills: 0 | DISCHARGE

## 2020-01-01 RX ORDER — PANTOPRAZOLE SODIUM 20 MG/1
40 TABLET, DELAYED RELEASE ORAL
Refills: 0 | Status: DISCONTINUED | OUTPATIENT
Start: 2020-01-01 | End: 2020-01-01

## 2020-01-01 RX ORDER — ALTEPLASE 100 MG
50 KIT INTRAVENOUS ONCE
Refills: 0 | Status: DISCONTINUED | OUTPATIENT
Start: 2020-01-01 | End: 2020-01-01

## 2020-01-01 RX ORDER — MAGNESIUM SULFATE 500 MG/ML
1 VIAL (ML) INJECTION ONCE
Refills: 0 | Status: COMPLETED | OUTPATIENT
Start: 2020-01-01 | End: 2020-01-01

## 2020-01-01 RX ORDER — LISINOPRIL 2.5 MG/1
5 TABLET ORAL DAILY
Refills: 0 | Status: DISCONTINUED | OUTPATIENT
Start: 2020-01-01 | End: 2020-01-01

## 2020-01-01 RX ORDER — OXYCODONE AND ACETAMINOPHEN 5; 325 MG/1; MG/1
1 TABLET ORAL ONCE
Refills: 0 | Status: DISCONTINUED | OUTPATIENT
Start: 2020-01-01 | End: 2020-01-01

## 2020-01-01 RX ORDER — METFORMIN HYDROCHLORIDE 850 MG/1
1 TABLET ORAL
Qty: 0 | Refills: 0 | DISCHARGE

## 2020-01-01 RX ORDER — PANTOPRAZOLE SODIUM 20 MG/1
40 TABLET, DELAYED RELEASE ORAL ONCE
Refills: 0 | Status: COMPLETED | OUTPATIENT
Start: 2020-01-01 | End: 2020-01-01

## 2020-01-01 RX ORDER — POTASSIUM PHOSPHATE, MONOBASIC POTASSIUM PHOSPHATE, DIBASIC 236; 224 MG/ML; MG/ML
30 INJECTION, SOLUTION INTRAVENOUS ONCE
Refills: 0 | Status: COMPLETED | OUTPATIENT
Start: 2020-01-01 | End: 2020-01-01

## 2020-01-01 RX ORDER — APIXABAN 2.5 MG/1
10 TABLET, FILM COATED ORAL
Qty: 0 | Refills: 0 | DISCHARGE

## 2020-01-01 RX ORDER — ASPIRIN/CALCIUM CARB/MAGNESIUM 324 MG
81 TABLET ORAL DAILY
Refills: 0 | Status: DISCONTINUED | OUTPATIENT
Start: 2020-01-01 | End: 2020-01-01

## 2020-01-01 RX ORDER — DEXTROSE 50 % IN WATER 50 %
12.5 SYRINGE (ML) INTRAVENOUS ONCE
Refills: 0 | Status: DISCONTINUED | OUTPATIENT
Start: 2020-01-01 | End: 2020-01-01

## 2020-01-01 RX ORDER — FUROSEMIDE 40 MG
10 TABLET ORAL ONCE
Refills: 0 | Status: COMPLETED | OUTPATIENT
Start: 2020-01-01 | End: 2020-01-01

## 2020-01-01 RX ORDER — SODIUM CHLORIDE 9 MG/ML
1000 INJECTION INTRAMUSCULAR; INTRAVENOUS; SUBCUTANEOUS ONCE
Refills: 0 | Status: COMPLETED | OUTPATIENT
Start: 2020-01-01 | End: 2020-01-01

## 2020-01-01 RX ORDER — HEPARIN SODIUM 5000 [USP'U]/ML
INJECTION INTRAVENOUS; SUBCUTANEOUS
Qty: 25000 | Refills: 0 | Status: DISCONTINUED | OUTPATIENT
Start: 2020-01-01 | End: 2020-01-01

## 2020-01-01 RX ORDER — THIAMINE MONONITRATE (VIT B1) 100 MG
100 TABLET ORAL DAILY
Refills: 0 | Status: DISCONTINUED | OUTPATIENT
Start: 2020-01-01 | End: 2020-01-01

## 2020-01-01 RX ORDER — HEPARIN SODIUM 5000 [USP'U]/ML
2500 INJECTION INTRAVENOUS; SUBCUTANEOUS EVERY 6 HOURS
Refills: 0 | Status: DISCONTINUED | OUTPATIENT
Start: 2020-01-01 | End: 2020-01-01

## 2020-01-01 RX ORDER — THIAMINE MONONITRATE (VIT B1) 100 MG
1 TABLET ORAL
Qty: 0 | Refills: 0 | DISCHARGE

## 2020-01-01 RX ORDER — INSULIN LISPRO 100/ML
VIAL (ML) SUBCUTANEOUS AT BEDTIME
Refills: 0 | Status: DISCONTINUED | OUTPATIENT
Start: 2020-01-01 | End: 2020-01-01

## 2020-01-01 RX ORDER — INSULIN LISPRO 100/ML
3 VIAL (ML) SUBCUTANEOUS
Refills: 0 | Status: DISCONTINUED | OUTPATIENT
Start: 2020-01-01 | End: 2020-01-01

## 2020-01-01 RX ORDER — VANCOMYCIN HCL 1 G
1000 VIAL (EA) INTRAVENOUS EVERY 12 HOURS
Refills: 0 | Status: DISCONTINUED | OUTPATIENT
Start: 2020-01-01 | End: 2020-01-01

## 2020-01-01 RX ORDER — CHLORHEXIDINE GLUCONATE 213 G/1000ML
1 SOLUTION TOPICAL
Refills: 0 | Status: DISCONTINUED | OUTPATIENT
Start: 2020-01-01 | End: 2020-01-01

## 2020-01-01 RX ORDER — ATORVASTATIN CALCIUM 80 MG/1
1 TABLET, FILM COATED ORAL
Qty: 0 | Refills: 0 | DISCHARGE

## 2020-01-01 RX ORDER — NITROGLYCERIN 6.5 MG
1 CAPSULE, EXTENDED RELEASE ORAL ONCE
Refills: 0 | Status: COMPLETED | OUTPATIENT
Start: 2020-01-01 | End: 2020-01-01

## 2020-01-01 RX ORDER — NYSTATIN CREAM 100000 [USP'U]/G
1 CREAM TOPICAL
Refills: 0 | Status: DISCONTINUED | OUTPATIENT
Start: 2020-01-01 | End: 2020-01-01

## 2020-01-01 RX ORDER — HEPARIN SODIUM 5000 [USP'U]/ML
5500 INJECTION INTRAVENOUS; SUBCUTANEOUS EVERY 6 HOURS
Refills: 0 | Status: DISCONTINUED | OUTPATIENT
Start: 2020-01-01 | End: 2020-01-01

## 2020-01-01 RX ORDER — ACETAMINOPHEN 500 MG
2 TABLET ORAL
Qty: 0 | Refills: 0 | DISCHARGE

## 2020-01-01 RX ORDER — POTASSIUM CHLORIDE 20 MEQ
40 PACKET (EA) ORAL ONCE
Refills: 0 | Status: COMPLETED | OUTPATIENT
Start: 2020-01-01 | End: 2020-01-01

## 2020-01-01 RX ORDER — VANCOMYCIN HCL 1 G
1000 VIAL (EA) INTRAVENOUS ONCE
Refills: 0 | Status: COMPLETED | OUTPATIENT
Start: 2020-01-01 | End: 2020-01-01

## 2020-01-01 RX ORDER — ASCORBIC ACID 60 MG
500 TABLET,CHEWABLE ORAL DAILY
Refills: 0 | Status: DISCONTINUED | OUTPATIENT
Start: 2020-01-01 | End: 2020-01-01

## 2020-01-01 RX ORDER — CHOLECALCIFEROL (VITAMIN D3) 125 MCG
1 CAPSULE ORAL
Qty: 0 | Refills: 0 | DISCHARGE

## 2020-01-01 RX ORDER — CLOPIDOGREL BISULFATE 75 MG/1
1 TABLET, FILM COATED ORAL
Qty: 0 | Refills: 0 | DISCHARGE

## 2020-01-01 RX ORDER — OXYCODONE HYDROCHLORIDE 5 MG/1
5 TABLET ORAL EVERY 6 HOURS
Refills: 0 | Status: DISCONTINUED | OUTPATIENT
Start: 2020-01-01 | End: 2020-01-01

## 2020-01-01 RX ORDER — ALTEPLASE 100 MG
100 KIT INTRAVENOUS ONCE
Refills: 0 | Status: COMPLETED | OUTPATIENT
Start: 2020-01-01 | End: 2020-01-01

## 2020-01-01 RX ORDER — CHOLECALCIFEROL (VITAMIN D3) 125 MCG
1000 CAPSULE ORAL DAILY
Refills: 0 | Status: DISCONTINUED | OUTPATIENT
Start: 2020-01-01 | End: 2020-01-01

## 2020-01-01 RX ORDER — ENOXAPARIN SODIUM 100 MG/ML
70 INJECTION SUBCUTANEOUS EVERY 12 HOURS
Refills: 0 | Status: DISCONTINUED | OUTPATIENT
Start: 2020-01-01 | End: 2020-01-01

## 2020-01-01 RX ORDER — ASCORBIC ACID 60 MG
1 TABLET,CHEWABLE ORAL
Qty: 0 | Refills: 0 | DISCHARGE

## 2020-01-01 RX ORDER — ASPIRIN/CALCIUM CARB/MAGNESIUM 324 MG
1 TABLET ORAL
Qty: 0 | Refills: 0 | DISCHARGE

## 2020-01-01 RX ORDER — ATORVASTATIN CALCIUM 80 MG/1
40 TABLET, FILM COATED ORAL AT BEDTIME
Refills: 0 | Status: DISCONTINUED | OUTPATIENT
Start: 2020-01-01 | End: 2020-01-01

## 2020-01-01 RX ORDER — POTASSIUM PHOSPHATE, MONOBASIC POTASSIUM PHOSPHATE, DIBASIC 236; 224 MG/ML; MG/ML
30 INJECTION, SOLUTION INTRAVENOUS ONCE
Refills: 0 | Status: DISCONTINUED | OUTPATIENT
Start: 2020-01-01 | End: 2020-01-01

## 2020-01-01 RX ORDER — ACETAMINOPHEN 500 MG
650 TABLET ORAL ONCE
Refills: 0 | Status: COMPLETED | OUTPATIENT
Start: 2020-01-01 | End: 2020-01-01

## 2020-01-01 RX ORDER — LISINOPRIL 2.5 MG/1
1 TABLET ORAL
Qty: 0 | Refills: 0 | DISCHARGE

## 2020-01-01 RX ORDER — MAGNESIUM SULFATE 500 MG/ML
2 VIAL (ML) INJECTION ONCE
Refills: 0 | Status: DISCONTINUED | OUTPATIENT
Start: 2020-01-01 | End: 2020-01-01

## 2020-01-01 RX ORDER — ALTEPLASE 100 MG
50 KIT INTRAVENOUS ONCE
Refills: 0 | Status: COMPLETED | OUTPATIENT
Start: 2020-01-01 | End: 2020-01-01

## 2020-01-01 RX ORDER — PIPERACILLIN AND TAZOBACTAM 4; .5 G/20ML; G/20ML
3.38 INJECTION, POWDER, LYOPHILIZED, FOR SOLUTION INTRAVENOUS EVERY 8 HOURS
Refills: 0 | Status: COMPLETED | OUTPATIENT
Start: 2020-01-01 | End: 2020-01-01

## 2020-01-01 RX ORDER — METHOCARBAMOL 500 MG/1
500 TABLET, FILM COATED ORAL EVERY 6 HOURS
Refills: 0 | Status: DISCONTINUED | OUTPATIENT
Start: 2020-01-01 | End: 2020-01-01

## 2020-01-01 RX ORDER — METHOCARBAMOL 500 MG/1
1 TABLET, FILM COATED ORAL
Qty: 0 | Refills: 0 | DISCHARGE

## 2020-01-01 RX ADMIN — Medication 5: at 08:47

## 2020-01-01 RX ADMIN — PIPERACILLIN AND TAZOBACTAM 25 GRAM(S): 4; .5 INJECTION, POWDER, LYOPHILIZED, FOR SOLUTION INTRAVENOUS at 21:23

## 2020-01-01 RX ADMIN — ROPINIROLE 0.25 MILLIGRAM(S): 8 TABLET, FILM COATED, EXTENDED RELEASE ORAL at 08:42

## 2020-01-01 RX ADMIN — Medication 4: at 23:08

## 2020-01-01 RX ADMIN — ENOXAPARIN SODIUM 70 MILLIGRAM(S): 100 INJECTION SUBCUTANEOUS at 17:24

## 2020-01-01 RX ADMIN — OXYCODONE HYDROCHLORIDE 5 MILLIGRAM(S): 5 TABLET ORAL at 03:45

## 2020-01-01 RX ADMIN — Medication 250 MILLIGRAM(S): at 09:40

## 2020-01-01 RX ADMIN — PIPERACILLIN AND TAZOBACTAM 25 GRAM(S): 4; .5 INJECTION, POWDER, LYOPHILIZED, FOR SOLUTION INTRAVENOUS at 13:18

## 2020-01-01 RX ADMIN — Medication 2: at 18:45

## 2020-01-01 RX ADMIN — Medication 81 MILLIGRAM(S): at 11:53

## 2020-01-01 RX ADMIN — PIPERACILLIN AND TAZOBACTAM 25 GRAM(S): 4; .5 INJECTION, POWDER, LYOPHILIZED, FOR SOLUTION INTRAVENOUS at 23:29

## 2020-01-01 RX ADMIN — Medication 8: at 21:48

## 2020-01-01 RX ADMIN — Medication 2: at 17:08

## 2020-01-01 RX ADMIN — Medication 250 MILLIGRAM(S): at 19:22

## 2020-01-01 RX ADMIN — ROPINIROLE 0.25 MILLIGRAM(S): 8 TABLET, FILM COATED, EXTENDED RELEASE ORAL at 17:24

## 2020-01-01 RX ADMIN — Medication 81 MILLIGRAM(S): at 11:45

## 2020-01-01 RX ADMIN — Medication 2: at 08:30

## 2020-01-01 RX ADMIN — Medication 100 MILLIGRAM(S): at 12:05

## 2020-01-01 RX ADMIN — ENOXAPARIN SODIUM 70 MILLIGRAM(S): 100 INJECTION SUBCUTANEOUS at 06:44

## 2020-01-01 RX ADMIN — NYSTATIN CREAM 1 APPLICATION(S): 100000 CREAM TOPICAL at 13:20

## 2020-01-01 RX ADMIN — Medication 100 MILLIGRAM(S): at 11:53

## 2020-01-01 RX ADMIN — ROPINIROLE 0.25 MILLIGRAM(S): 8 TABLET, FILM COATED, EXTENDED RELEASE ORAL at 06:04

## 2020-01-01 RX ADMIN — OXYCODONE AND ACETAMINOPHEN 1 TABLET(S): 5; 325 TABLET ORAL at 15:30

## 2020-01-01 RX ADMIN — POTASSIUM PHOSPHATE, MONOBASIC POTASSIUM PHOSPHATE, DIBASIC 83.33 MILLIMOLE(S): 236; 224 INJECTION, SOLUTION INTRAVENOUS at 10:45

## 2020-01-01 RX ADMIN — Medication 3 UNIT(S): at 08:12

## 2020-01-01 RX ADMIN — Medication 40 MILLIEQUIVALENT(S): at 08:40

## 2020-01-01 RX ADMIN — POTASSIUM PHOSPHATE, MONOBASIC POTASSIUM PHOSPHATE, DIBASIC 83.33 MILLIMOLE(S): 236; 224 INJECTION, SOLUTION INTRAVENOUS at 09:06

## 2020-01-01 RX ADMIN — Medication 4: at 17:23

## 2020-01-01 RX ADMIN — PANTOPRAZOLE SODIUM 40 MILLIGRAM(S): 20 TABLET, DELAYED RELEASE ORAL at 11:38

## 2020-01-01 RX ADMIN — Medication 650 MILLIGRAM(S): at 19:45

## 2020-01-01 RX ADMIN — ROPINIROLE 0.25 MILLIGRAM(S): 8 TABLET, FILM COATED, EXTENDED RELEASE ORAL at 21:27

## 2020-01-01 RX ADMIN — Medication 2: at 08:17

## 2020-01-01 RX ADMIN — ROPINIROLE 0.25 MILLIGRAM(S): 8 TABLET, FILM COATED, EXTENDED RELEASE ORAL at 05:24

## 2020-01-01 RX ADMIN — Medication 81 MILLIGRAM(S): at 12:13

## 2020-01-01 RX ADMIN — PIPERACILLIN AND TAZOBACTAM 25 GRAM(S): 4; .5 INJECTION, POWDER, LYOPHILIZED, FOR SOLUTION INTRAVENOUS at 05:20

## 2020-01-01 RX ADMIN — Medication 1 INCH(S): at 16:28

## 2020-01-01 RX ADMIN — LISINOPRIL 5 MILLIGRAM(S): 2.5 TABLET ORAL at 05:46

## 2020-01-01 RX ADMIN — NYSTATIN CREAM 1 APPLICATION(S): 100000 CREAM TOPICAL at 18:08

## 2020-01-01 RX ADMIN — CHLORHEXIDINE GLUCONATE 1 APPLICATION(S): 213 SOLUTION TOPICAL at 09:42

## 2020-01-01 RX ADMIN — Medication 500 MILLIGRAM(S): at 12:09

## 2020-01-01 RX ADMIN — Medication 2: at 16:46

## 2020-01-01 RX ADMIN — Medication 10 MILLIGRAM(S): at 16:29

## 2020-01-01 RX ADMIN — Medication 8: at 12:07

## 2020-01-01 RX ADMIN — PANTOPRAZOLE SODIUM 40 MILLIGRAM(S): 20 TABLET, DELAYED RELEASE ORAL at 05:09

## 2020-01-01 RX ADMIN — Medication 81 MILLIGRAM(S): at 11:43

## 2020-01-01 RX ADMIN — Medication 500 MILLIGRAM(S): at 12:13

## 2020-01-01 RX ADMIN — Medication 4: at 12:10

## 2020-01-01 RX ADMIN — Medication 1 INCH(S): at 04:28

## 2020-01-01 RX ADMIN — PIPERACILLIN AND TAZOBACTAM 25 GRAM(S): 4; .5 INJECTION, POWDER, LYOPHILIZED, FOR SOLUTION INTRAVENOUS at 06:04

## 2020-01-01 RX ADMIN — ROPINIROLE 0.25 MILLIGRAM(S): 8 TABLET, FILM COATED, EXTENDED RELEASE ORAL at 20:53

## 2020-01-01 RX ADMIN — Medication 100 GRAM(S): at 08:04

## 2020-01-01 RX ADMIN — Medication 100 MILLIGRAM(S): at 23:20

## 2020-01-01 RX ADMIN — ENOXAPARIN SODIUM 70 MILLIGRAM(S): 100 INJECTION SUBCUTANEOUS at 06:04

## 2020-01-01 RX ADMIN — ENOXAPARIN SODIUM 70 MILLIGRAM(S): 100 INJECTION SUBCUTANEOUS at 05:45

## 2020-01-01 RX ADMIN — Medication 100 MILLIGRAM(S): at 12:14

## 2020-01-01 RX ADMIN — ENOXAPARIN SODIUM 70 MILLIGRAM(S): 100 INJECTION SUBCUTANEOUS at 05:21

## 2020-01-01 RX ADMIN — ENOXAPARIN SODIUM 70 MILLIGRAM(S): 100 INJECTION SUBCUTANEOUS at 19:48

## 2020-01-01 RX ADMIN — Medication 3 UNIT(S): at 11:42

## 2020-01-01 RX ADMIN — Medication 4: at 17:05

## 2020-01-01 RX ADMIN — ENOXAPARIN SODIUM 70 MILLIGRAM(S): 100 INJECTION SUBCUTANEOUS at 17:20

## 2020-01-01 RX ADMIN — ALTEPLASE 25 MILLIGRAM(S): KIT at 21:49

## 2020-01-01 RX ADMIN — OXYCODONE HYDROCHLORIDE 5 MILLIGRAM(S): 5 TABLET ORAL at 21:32

## 2020-01-01 RX ADMIN — Medication 8: at 16:01

## 2020-01-01 RX ADMIN — ENOXAPARIN SODIUM 70 MILLIGRAM(S): 100 INJECTION SUBCUTANEOUS at 17:28

## 2020-01-01 RX ADMIN — Medication 6: at 12:12

## 2020-01-01 RX ADMIN — Medication 50 GRAM(S): at 08:39

## 2020-01-01 RX ADMIN — ROPINIROLE 0.25 MILLIGRAM(S): 8 TABLET, FILM COATED, EXTENDED RELEASE ORAL at 17:16

## 2020-01-01 RX ADMIN — OXYCODONE HYDROCHLORIDE 5 MILLIGRAM(S): 5 TABLET ORAL at 11:56

## 2020-01-01 RX ADMIN — Medication 250 MILLIGRAM(S): at 20:08

## 2020-01-01 RX ADMIN — PANTOPRAZOLE SODIUM 40 MILLIGRAM(S): 20 TABLET, DELAYED RELEASE ORAL at 05:32

## 2020-01-01 RX ADMIN — Medication 100 MILLIGRAM(S): at 12:09

## 2020-01-01 RX ADMIN — OXYCODONE HYDROCHLORIDE 5 MILLIGRAM(S): 5 TABLET ORAL at 21:59

## 2020-01-01 RX ADMIN — ROPINIROLE 0.25 MILLIGRAM(S): 8 TABLET, FILM COATED, EXTENDED RELEASE ORAL at 05:34

## 2020-01-01 RX ADMIN — NYSTATIN CREAM 1 APPLICATION(S): 100000 CREAM TOPICAL at 11:53

## 2020-01-01 RX ADMIN — CHLORHEXIDINE GLUCONATE 1 APPLICATION(S): 213 SOLUTION TOPICAL at 05:26

## 2020-01-01 RX ADMIN — Medication 4: at 17:02

## 2020-01-01 RX ADMIN — Medication 250 MILLIGRAM(S): at 08:46

## 2020-01-01 RX ADMIN — PANTOPRAZOLE SODIUM 40 MILLIGRAM(S): 20 TABLET, DELAYED RELEASE ORAL at 08:01

## 2020-01-01 RX ADMIN — Medication 650 MILLIGRAM(S): at 18:40

## 2020-01-01 RX ADMIN — PIPERACILLIN AND TAZOBACTAM 25 GRAM(S): 4; .5 INJECTION, POWDER, LYOPHILIZED, FOR SOLUTION INTRAVENOUS at 21:30

## 2020-01-01 RX ADMIN — CHLORHEXIDINE GLUCONATE 1 APPLICATION(S): 213 SOLUTION TOPICAL at 06:08

## 2020-01-01 RX ADMIN — Medication 500 MILLIGRAM(S): at 11:43

## 2020-01-01 RX ADMIN — LISINOPRIL 5 MILLIGRAM(S): 2.5 TABLET ORAL at 05:09

## 2020-01-01 RX ADMIN — Medication 81 MILLIGRAM(S): at 12:05

## 2020-01-01 RX ADMIN — ENOXAPARIN SODIUM 70 MILLIGRAM(S): 100 INJECTION SUBCUTANEOUS at 05:10

## 2020-01-01 RX ADMIN — Medication 500 MILLIGRAM(S): at 12:05

## 2020-01-01 RX ADMIN — Medication 500 MILLIGRAM(S): at 11:45

## 2020-01-01 RX ADMIN — LISINOPRIL 5 MILLIGRAM(S): 2.5 TABLET ORAL at 05:34

## 2020-01-01 RX ADMIN — OXYCODONE HYDROCHLORIDE 5 MILLIGRAM(S): 5 TABLET ORAL at 13:50

## 2020-01-01 RX ADMIN — INSULIN GLARGINE 5 UNIT(S): 100 INJECTION, SOLUTION SUBCUTANEOUS at 21:27

## 2020-01-01 RX ADMIN — Medication 2: at 21:54

## 2020-01-01 RX ADMIN — Medication 4: at 12:13

## 2020-01-01 RX ADMIN — Medication 6: at 23:21

## 2020-01-01 RX ADMIN — Medication 81 MILLIGRAM(S): at 12:14

## 2020-01-01 RX ADMIN — Medication 100 MILLIGRAM(S): at 11:45

## 2020-01-01 RX ADMIN — OXYCODONE AND ACETAMINOPHEN 1 TABLET(S): 5; 325 TABLET ORAL at 14:29

## 2020-01-01 RX ADMIN — ROPINIROLE 0.25 MILLIGRAM(S): 8 TABLET, FILM COATED, EXTENDED RELEASE ORAL at 05:46

## 2020-01-01 RX ADMIN — Medication 100 MILLIGRAM(S): at 12:13

## 2020-01-01 RX ADMIN — LISINOPRIL 5 MILLIGRAM(S): 2.5 TABLET ORAL at 05:32

## 2020-01-01 RX ADMIN — NYSTATIN CREAM 1 APPLICATION(S): 100000 CREAM TOPICAL at 11:46

## 2020-01-01 RX ADMIN — ROPINIROLE 0.25 MILLIGRAM(S): 8 TABLET, FILM COATED, EXTENDED RELEASE ORAL at 05:09

## 2020-01-01 RX ADMIN — Medication 6: at 12:04

## 2020-01-01 RX ADMIN — PIPERACILLIN AND TAZOBACTAM 25 GRAM(S): 4; .5 INJECTION, POWDER, LYOPHILIZED, FOR SOLUTION INTRAVENOUS at 04:25

## 2020-01-01 RX ADMIN — ENOXAPARIN SODIUM 70 MILLIGRAM(S): 100 INJECTION SUBCUTANEOUS at 17:16

## 2020-01-01 RX ADMIN — ROPINIROLE 0.25 MILLIGRAM(S): 8 TABLET, FILM COATED, EXTENDED RELEASE ORAL at 17:02

## 2020-01-01 RX ADMIN — ENOXAPARIN SODIUM 70 MILLIGRAM(S): 100 INJECTION SUBCUTANEOUS at 05:34

## 2020-01-01 RX ADMIN — PIPERACILLIN AND TAZOBACTAM 25 GRAM(S): 4; .5 INJECTION, POWDER, LYOPHILIZED, FOR SOLUTION INTRAVENOUS at 14:33

## 2020-01-01 RX ADMIN — Medication 500 MILLIGRAM(S): at 11:53

## 2020-01-01 RX ADMIN — PANTOPRAZOLE SODIUM 40 MILLIGRAM(S): 20 TABLET, DELAYED RELEASE ORAL at 05:34

## 2020-01-01 RX ADMIN — PIPERACILLIN AND TAZOBACTAM 25 GRAM(S): 4; .5 INJECTION, POWDER, LYOPHILIZED, FOR SOLUTION INTRAVENOUS at 13:58

## 2020-01-01 RX ADMIN — ROPINIROLE 0.25 MILLIGRAM(S): 8 TABLET, FILM COATED, EXTENDED RELEASE ORAL at 18:17

## 2020-01-01 RX ADMIN — PIPERACILLIN AND TAZOBACTAM 25 GRAM(S): 4; .5 INJECTION, POWDER, LYOPHILIZED, FOR SOLUTION INTRAVENOUS at 12:15

## 2020-01-01 RX ADMIN — Medication 500 MILLIGRAM(S): at 13:05

## 2020-01-01 RX ADMIN — Medication 1000 MILLIGRAM(S): at 21:03

## 2020-01-01 RX ADMIN — ROPINIROLE 0.25 MILLIGRAM(S): 8 TABLET, FILM COATED, EXTENDED RELEASE ORAL at 05:32

## 2020-01-01 RX ADMIN — NYSTATIN CREAM 1 APPLICATION(S): 100000 CREAM TOPICAL at 12:05

## 2020-01-01 RX ADMIN — ENOXAPARIN SODIUM 70 MILLIGRAM(S): 100 INJECTION SUBCUTANEOUS at 05:33

## 2020-01-01 RX ADMIN — Medication 4: at 12:20

## 2020-01-01 RX ADMIN — HEPARIN SODIUM 1200 UNIT(S)/HR: 5000 INJECTION INTRAVENOUS; SUBCUTANEOUS at 12:58

## 2020-01-01 RX ADMIN — OXYCODONE HYDROCHLORIDE 5 MILLIGRAM(S): 5 TABLET ORAL at 20:49

## 2020-01-01 RX ADMIN — Medication 2: at 08:40

## 2020-01-01 RX ADMIN — CHLORHEXIDINE GLUCONATE 1 APPLICATION(S): 213 SOLUTION TOPICAL at 06:46

## 2020-01-01 RX ADMIN — NYSTATIN CREAM 1 APPLICATION(S): 100000 CREAM TOPICAL at 11:44

## 2020-01-01 RX ADMIN — ENOXAPARIN SODIUM 70 MILLIGRAM(S): 100 INJECTION SUBCUTANEOUS at 18:07

## 2020-01-01 RX ADMIN — ALTEPLASE 50 MILLIGRAM(S): KIT at 10:55

## 2020-01-01 RX ADMIN — PANTOPRAZOLE SODIUM 40 MILLIGRAM(S): 20 TABLET, DELAYED RELEASE ORAL at 21:26

## 2020-01-01 RX ADMIN — PANTOPRAZOLE SODIUM 40 MILLIGRAM(S): 20 TABLET, DELAYED RELEASE ORAL at 05:46

## 2020-01-01 RX ADMIN — NYSTATIN CREAM 1 APPLICATION(S): 100000 CREAM TOPICAL at 12:08

## 2020-01-01 RX ADMIN — OXYCODONE HYDROCHLORIDE 5 MILLIGRAM(S): 5 TABLET ORAL at 12:56

## 2020-01-01 RX ADMIN — PIPERACILLIN AND TAZOBACTAM 200 GRAM(S): 4; .5 INJECTION, POWDER, LYOPHILIZED, FOR SOLUTION INTRAVENOUS at 21:03

## 2020-01-01 RX ADMIN — PANTOPRAZOLE SODIUM 40 MILLIGRAM(S): 20 TABLET, DELAYED RELEASE ORAL at 08:13

## 2020-01-01 RX ADMIN — PANTOPRAZOLE SODIUM 40 MILLIGRAM(S): 20 TABLET, DELAYED RELEASE ORAL at 06:44

## 2020-01-01 RX ADMIN — Medication 81 MILLIGRAM(S): at 12:09

## 2020-01-01 RX ADMIN — PIPERACILLIN AND TAZOBACTAM 25 GRAM(S): 4; .5 INJECTION, POWDER, LYOPHILIZED, FOR SOLUTION INTRAVENOUS at 23:15

## 2020-01-01 RX ADMIN — ENOXAPARIN SODIUM 70 MILLIGRAM(S): 100 INJECTION SUBCUTANEOUS at 08:42

## 2020-01-01 RX ADMIN — OXYCODONE HYDROCHLORIDE 5 MILLIGRAM(S): 5 TABLET ORAL at 03:12

## 2020-01-01 RX ADMIN — Medication 4: at 17:27

## 2020-01-01 RX ADMIN — SODIUM CHLORIDE 1000 MILLILITER(S): 9 INJECTION INTRAMUSCULAR; INTRAVENOUS; SUBCUTANEOUS at 20:48

## 2020-01-01 RX ADMIN — ENOXAPARIN SODIUM 70 MILLIGRAM(S): 100 INJECTION SUBCUTANEOUS at 18:17

## 2020-01-01 RX ADMIN — PIPERACILLIN AND TAZOBACTAM 25 GRAM(S): 4; .5 INJECTION, POWDER, LYOPHILIZED, FOR SOLUTION INTRAVENOUS at 04:44

## 2020-01-01 RX ADMIN — Medication 4: at 22:05

## 2020-01-01 RX ADMIN — Medication 4: at 12:05

## 2020-01-01 RX ADMIN — Medication 100 MILLIGRAM(S): at 12:06

## 2020-01-01 RX ADMIN — PIPERACILLIN AND TAZOBACTAM 25 GRAM(S): 4; .5 INJECTION, POWDER, LYOPHILIZED, FOR SOLUTION INTRAVENOUS at 21:24

## 2020-01-01 RX ADMIN — ENOXAPARIN SODIUM 70 MILLIGRAM(S): 100 INJECTION SUBCUTANEOUS at 17:02

## 2020-01-01 RX ADMIN — Medication 10 MILLIGRAM(S): at 10:38

## 2020-01-01 RX ADMIN — NYSTATIN CREAM 1 APPLICATION(S): 100000 CREAM TOPICAL at 15:46

## 2020-01-01 RX ADMIN — CHLORHEXIDINE GLUCONATE 1 APPLICATION(S): 213 SOLUTION TOPICAL at 06:27

## 2020-01-01 RX ADMIN — OXYCODONE HYDROCHLORIDE 5 MILLIGRAM(S): 5 TABLET ORAL at 12:50

## 2020-01-01 RX ADMIN — PIPERACILLIN AND TAZOBACTAM 25 GRAM(S): 4; .5 INJECTION, POWDER, LYOPHILIZED, FOR SOLUTION INTRAVENOUS at 12:07

## 2020-01-01 RX ADMIN — CHLORHEXIDINE GLUCONATE 1 APPLICATION(S): 213 SOLUTION TOPICAL at 05:22

## 2020-01-01 RX ADMIN — POTASSIUM PHOSPHATE, MONOBASIC POTASSIUM PHOSPHATE, DIBASIC 83.33 MILLIMOLE(S): 236; 224 INJECTION, SOLUTION INTRAVENOUS at 13:50

## 2020-01-01 RX ADMIN — Medication 100 MILLIGRAM(S): at 12:12

## 2020-01-01 RX ADMIN — LISINOPRIL 5 MILLIGRAM(S): 2.5 TABLET ORAL at 08:01

## 2020-01-01 RX ADMIN — ROPINIROLE 0.25 MILLIGRAM(S): 8 TABLET, FILM COATED, EXTENDED RELEASE ORAL at 17:27

## 2020-01-01 RX ADMIN — Medication 100 MILLIGRAM(S): at 11:43

## 2020-10-14 NOTE — ED ADULT NURSE NOTE - CAS TRG GENERAL AIRWAY, MLM
Nurse educates patient on safe sleep and falls risk. FOB also educates. Bother verbalize understanding and have no further questions at this time. Patent

## 2020-12-02 NOTE — ED ADULT NURSE NOTE - OBJECTIVE STATEMENT
Pt. received alert and oriented x3 with chief complaint of generalized weakness. Pt. presents w/ suspected DTI to sacral area and stage 2 pressure ulcer to coccyx. Pt. also presents w/ rash to bilateral axillary area and right side of abdomen. Pt. placed on continuous cardiac monitor with continuous pulse ox. EKG performed at bedside upon arrival.

## 2020-12-02 NOTE — H&P ADULT - PROBLEM SELECTOR PLAN 5
- hyperglycemic on presentation.   - hold metformin   - start insulin sliding scale, hypoglycemia protocol  - f/u a1c

## 2020-12-02 NOTE — H&P ADULT - NSICDXPASTMEDICALHX_GEN_ALL_CORE_FT
PAST MEDICAL HISTORY:  Bedbound     GERD (gastroesophageal reflux disease)     H/O osteomyelitis     HLD (hyperlipidemia)     HTN (hypertension)     Spinal stenosis     TIA (transient ischemic attack)

## 2020-12-02 NOTE — H&P ADULT - NSHPREVIEWOFSYSTEMS_GEN_ALL_CORE
Constitutional: denies fever, chills, general malaise  HEENT: denies dry mouth, sore throat, runny nose, visual changes  Respiratory: denies SOB, ORTIZ, cough, sputum production, wheezing  Cardiovascular: denies CP, palpitations, edema  Gastrointestinal: denies nausea, vomiting, diarrhea, constipation, abdominal pain, melena, hematochezia   Genitourinary: denies dysuria, frequency, urgency, incontinence, hematuria   Skin/Breast: denies rash, wounds  Musculoskeletal: denies myalgias, arthralgias  Neurologic: denies headache, weakness, dizziness  ROS negative except as noted above

## 2020-12-02 NOTE — ED ADULT NURSE NOTE - NSIMPLEMENTINTERV_GEN_ALL_ED
Implemented All Fall with Harm Risk Interventions:  [place] to call system. Call bell, personal items and telephone within reach. Instruct patient to call for assistance. Room bathroom lighting operational. Non-slip footwear when patient is off stretcher. Physically safe environment: no spills, clutter or unnecessary equipment. Stretcher in lowest position, wheels locked, appropriate side rails in place. Provide visual cue, wrist band, yellow gown, etc. Monitor gait and stability. Monitor for mental status changes and reorient to person, place, and time. Review medications for side effects contributing to fall risk. Reinforce activity limits and safety measures with patient and family. Provide visual clues: red socks.

## 2020-12-02 NOTE — H&P ADULT - PROBLEM SELECTOR PLAN 6
- pt with chronic pain, states hes been bedbound x several weeks  - has pain pump he says is no longer in use   - c/w oxycodone prn, robaxin prn

## 2020-12-02 NOTE — ED ADULT NURSE NOTE - CHIEF COMPLAINT QUOTE
from [facility] at [place] to be evaluated for difficulty breathing today low o2 sat   RA 88   non rebreather 98

## 2020-12-02 NOTE — ED ADULT TRIAGE NOTE - CHIEF COMPLAINT QUOTE
from [facility] at [place] to be evaluated for difficulty breathing today low o2 sat   RA 88   non rebreather 98

## 2020-12-02 NOTE — H&P ADULT - NSHPPHYSICALEXAM_GEN_ALL_CORE
Vital Signs Last 24 Hrs  T(C): 37.1 (02 Dec 2020 20:49), Max: 37.8 (02 Dec 2020 18:43)  T(F): 98.8 (02 Dec 2020 20:49), Max: 100 (02 Dec 2020 18:43)  HR: 90 (02 Dec 2020 20:49) (90 - 93)  BP: 108/71 (02 Dec 2020 20:49) (108/71 - 131/72)  BP(mean): --  RR: 18 (02 Dec 2020 20:49) (18 - 20)  SpO2: 100% (02 Dec 2020 20:49) (98% - 100%)    General: elderly , diaphoretic , ill appearing   HEENT: NCAT, PERRLA, EOMI bl, dry mucous membranes, poor dentition   Neck: Supple, nontender, no mass  Neurology: A&Ox3 but confused, nonfocal  Respiratory: diminished bilaterally. no w/r/r. no accessory muscle use. respirations non labored  CV: RRR, +S1/S2, no murmurs, rubs or gallops  Abdominal: Soft, NT, ND +BSx4, pain pump left abd  Extremities: trace lower extremity edema b/l pitting. no clubbing/cyanosis   Skin: warm, dry, normal color

## 2020-12-02 NOTE — H&P ADULT - HISTORY OF PRESENT ILLNESS
76 yo m pmh spinal stenosis, osteomyelitis of lumbar vertebrae, type 2 DM not on insulin, HTN, CAD, TIA, HLD, LISBETH, Iron deficiency anemia, thiamine deficienicy , GERD presents from [facility] rehab. Pt is poor historian, states he does not know why he is here. denies any pain, respiratory distress. All ROS neg. Per chart review from [facility] nurse noted that patient was not acting like himself, he appeared to be in respiratory distress. Vitals were obtained and pt was afebrile, bp WNL, but he was hypoxic in the 80's and had wheezing on exam. Nurse placed pt on O2 and gave a nebulizer treatment. Physician evaluated pt and noted pt was still in respiratory distress, c/o shortness of breath and asking to open the windows for air. Pt was on 100% NRB. He denied chest pain, abd pain, nausea or vomiting. Exam at that time revealed dec breath sounds, fine crepts @ bases, trace lower extremity edema, cold to touch +pulses. Pt repeatedly asking please help. Facility then transfered pt to ED.     In the ED pt vital signs were wnl. He was no longer complaining of shortness of breath. he was sating high 90's - 100% on RA.  Labs obtained revealed WBC 13.21, H&H 12.5/41.0, Co2 32, Bun 55, glucose 265, troponin .068, probnp 27705, UA grossly WNL. RVP neg. Pt was given vancomycin and zosyn x 1. 1L NS. Tylenol 650mg x 1.

## 2020-12-02 NOTE — ED PROVIDER NOTE - CARE PLAN
Principal Discharge DX:	Pneumonia due to infectious organism, unspecified laterality, unspecified part of lung

## 2020-12-02 NOTE — ED PROVIDER NOTE - OBJECTIVE STATEMENT
75 male sent to ER from [facility] by ambulance with report of respiratory distress, o2 sat 85%. Patient is poor historian, states he does not know why he is here, information obtained from chart.

## 2020-12-02 NOTE — ED PROVIDER NOTE - PMH
GERD (gastroesophageal reflux disease)    HLD (hyperlipidemia)    HTN (hypertension)    Spinal stenosis    TIA (transient ischemic attack)

## 2020-12-02 NOTE — H&P ADULT - ASSESSMENT
74 yo m pmh spinal stenosis, osteomyelitis of lumbar vertebrae, type 2 DM not on insulin, HTN, CAD, TIA, HLD, LISBETH, Iron deficiency anemia, thiamine deficienicy , GERD presents from [facility] admit for suspected pneumonia

## 2020-12-02 NOTE — H&P ADULT - PROBLEM SELECTOR PLAN 2
- pt presenting with sob, elevated troponin and abnormal EKG- no prior for comparison . denies chest pain  - consult cardio- [facility]   - trend enzymes   - c/w ASA, statin , plavix  - monitor on remote tele

## 2020-12-02 NOTE — H&P ADULT - PROBLEM SELECTOR PLAN 1
- pt presenting with acute respiratory distress. found to be hypoxic at facility requiring nrb.   - saturations have improved. currently sating well on r/a   - resp distress 2/2 pna vs pulmonary edema vs pe ?   - pt afebrile but with leukocytosis 13.21. trend cbc/fever curve   - RVP neg. f/u procalcitonin   - s/p vancomycin/zosyn x 1. continue for now.   - obtain speech and swallow eval  - pt with risk factors for PE given bedbound . ddimer pending . will obtain non con ct vs cta pending results to further eval   - c/w nebulizers prn , o2 via nc prn. maintain sat >90%  - pt also with trace lower extremity edema and elevated probnp. monitor strict I&Os may require diuresis.   - consult pulm- courtney

## 2020-12-02 NOTE — H&P ADULT - NSHPLABSRESULTS_GEN_ALL_CORE
12.5   13.21 )-----------( 314      ( 02 Dec 2020 18:46 )             41.0     02 Dec 2020 18:46    135    |  97     |  55     ----------------------------<  265    5.3     |  32     |  0.78     Ca    8.4        02 Dec 2020 18:46  Mg     2.1       02 Dec 2020 18:46    TPro  7.2    /  Alb  3.6    /  TBili  0.6    /  DBili  x      /  AST  22     /  ALT  50     /  AlkPhos  92     02 Dec 2020 18:46    LIVER FUNCTIONS - ( 02 Dec 2020 18:46 )  Alb: 3.6 g/dL / Pro: 7.2 g/dL / ALK PHOS: 92 U/L / ALT: 50 U/L / AST: 22 U/L / GGT: x             CAPILLARY BLOOD GLUCOSE        CARDIAC MARKERS ( 02 Dec 2020 18:46 )  .068 ng/mL / x     / x     / x     / 1.9 ng/mL      Urinalysis Basic - ( 02 Dec 2020 18:46 )    Color: Yellow / Appearance: Clear / S.015 / pH: x  Gluc: x / Ketone: Negative  / Bili: Negative / Urobili: Negative   Blood: x / Protein: 15 / Nitrite: Negative   Leuk Esterase: Small / RBC: x / WBC 0-2   Sq Epi: x / Non Sq Epi: Occasional / Bacteria: x

## 2020-12-03 NOTE — ADVANCED PRACTICE NURSE CONSULT - ASSESSMENT
Patient is a 76yo male admitted with PNA: He has a PMHX of HTN, HLD, OM, TIA, spinal stenosis, GERD, ventral hernia, pancreatitis, HLD, spinal stenosis: He presents with:   1: Deep tissue pressure injury (DTPI) at the dorsal  Hallux 2 x 2 no drainage  2. DTPI dorsal lower extremity 2nd digit 2 x 2   pedal pulse not palpable, no hair growth, cap refill >3 secs, cool extremities  3. Moisture associated skin damage (MASD) at intergluteal cleft  4. Chronic blanchable skin injury bilateral buttocks, sacral regon  second digit

## 2020-12-03 NOTE — ADVANCED PRACTICE NURSE CONSULT - RECOMMEDATIONS
1. Paint DTPIs with cavilon daily  2. Apply odessa BID to ESHA  RN educated in the care of this patients skin care

## 2020-12-03 NOTE — DIETITIAN INITIAL EVALUATION ADULT. - ETIOLOGY
related to decreased ability to consume sufficient energy related to increased needs during wound healing

## 2020-12-03 NOTE — PROGRESS NOTE ADULT - SUBJECTIVE AND OBJECTIVE BOX
Patient is a 75y old  Male who presents with a chief complaint of Resp Distress (03 Dec 2020 11:12)      INTERVAL HPI/OVERNIGHT EVENTS: Patient seen and examined at bedside in ED. Called by ED  nurse this AM about hypotension. Went to  see pt, who appeared very lethargic, SBP 60s and O2sat in 80s on room air. Informed nurse to place pt in Trendelenburg and started IV bolus, and started NC 5L. Advanced to non rebreather, notified ICU team. BPs slightly rising but pt still very lethargic, responds to his name. Was moving all extremities at the time, no facial assymetry. Rapid called-  ekg,  trops ordered, CTA chest ordered but AC line to be placed first. SBP at 90s. Bed side echo per ICU MD showed right heart strain, IVF stopped.  Pts daughter notified immediately that pt being moved to ICU, suspecting PE, pending CT. Cardio consult.    MEDICATIONS  (STANDING):  chlorhexidine 2% Cloths 1 Application(s) Topical <User Schedule>  dextrose 40% Gel 15 Gram(s) Oral once  dextrose 5%. 1000 milliLiter(s) (50 mL/Hr) IV Continuous <Continuous>  dextrose 5%. 1000 milliLiter(s) (100 mL/Hr) IV Continuous <Continuous>  dextrose 50% Injectable 25 Gram(s) IV Push once  dextrose 50% Injectable 12.5 Gram(s) IV Push once  dextrose 50% Injectable 25 Gram(s) IV Push once  glucagon  Injectable 1 milliGRAM(s) IntraMuscular once  heparin  Infusion.  Unit(s)/Hr (12 mL/Hr) IV Continuous <Continuous>  insulin lispro (ADMELOG) corrective regimen sliding scale   SubCutaneous every 6 hours  norepinephrine Infusion 0.05 MICROgram(s)/kG/Min (6.46 mL/Hr) IV Continuous <Continuous>  piperacillin/tazobactam IVPB.. 3.375 Gram(s) IV Intermittent every 8 hours  thiamine Injectable 100 milliGRAM(s) IV Push daily  vancomycin  IVPB 1000 milliGRAM(s) IV Intermittent every 12 hours    MEDICATIONS  (PRN):  heparin   Injectable 5500 Unit(s) IV Push every 6 hours PRN For aPTT less than 40  heparin   Injectable 2500 Unit(s) IV Push every 6 hours PRN For aPTT between 40 - 57      Allergies    No Known Allergies    Intolerances    REVIEW OF SYSTEMS: unable to obtain full ROS as pt unstable    Vital Signs Last 24 Hrs  T(C): 36.6 (03 Dec 2020 12:30), Max: 37.8 (02 Dec 2020 18:43)  T(F): 97.9 (03 Dec 2020 12:30), Max: 100 (02 Dec 2020 18:43)  HR: 83 (03 Dec 2020 14:30) (80 - 110)  BP: 93/53 (03 Dec 2020 14:30) (62/42 - 131/72)  BP(mean): 67 (03 Dec 2020 14:30) (59 - 86)  RR: 20 (03 Dec 2020 14:30) (10 - 33)  SpO2: 100% (03 Dec 2020 14:30) (96% - 100%)    PHYSICAL EXAM:  GENERAL: in resp distress, lethargy  HEENT:  anicteric, MMM  CHEST/LUNG: on NRB  HEART:  RRR, S1, S2  ABDOMEN:  BS+, soft, nontender, nondistended, palpable device in left lower abdomen  EXTREMITIES: no edema, cyanosis, or calf tenderness  NERVOUS SYSTEM: lethargic, answers name  UE and LE appears intact, moving extremities    LABS:                        11.4   14.20 )-----------( 305      ( 03 Dec 2020 06:22 )             38.4     CBC Full  -  ( 03 Dec 2020 06:22 )  WBC Count : 14.20 K/uL  Hemoglobin : 11.4 g/dL  Hematocrit : 38.4 %  Platelet Count - Automated : 305 K/uL  Mean Cell Volume : 107.0 fl  Mean Cell Hemoglobin : 31.8 pg  Mean Cell Hemoglobin Concentration : 29.7 gm/dL  Auto Neutrophil # : x  Auto Lymphocyte # : x  Auto Monocyte # : x  Auto Eosinophil # : x  Auto Basophil # : x  Auto Neutrophil % : x  Auto Lymphocyte % : x  Auto Monocyte % : x  Auto Eosinophil % : x  Auto Basophil % : x    03 Dec 2020 06:22    136    |  95     |  57     ----------------------------<  324    5.2     |  32     |  0.85     Ca    8.5        03 Dec 2020 06:22  Mg     2.1       02 Dec 2020 18:46    TPro  7.2    /  Alb  3.6    /  TBili  0.6    /  DBili  x      /  AST  22     /  ALT  50     /  AlkPhos  92     02 Dec 2020 18:46    PT/INR - ( 02 Dec 2020 18:46 )   PT: 12.1 sec;   INR: 1.04 ratio         PTT - ( 02 Dec 2020 18:46 )  PTT:24.8 sec  Urinalysis Basic - ( 02 Dec 2020 18:46 )    Color: Yellow / Appearance: Clear / S.015 / pH: x  Gluc: x / Ketone: Negative  / Bili: Negative / Urobili: Negative   Blood: x / Protein: 15 / Nitrite: Negative   Leuk Esterase: Small / RBC: x / WBC 0-2   Sq Epi: x / Non Sq Epi: Occasional / Bacteria: x      CAPILLARY BLOOD GLUCOSE      POCT Blood Glucose.: 313 mg/dL (03 Dec 2020 11:49)  POCT Blood Glucose.: 371 mg/dL (03 Dec 2020 08:01)  POCT Blood Glucose.: 268 mg/dL (02 Dec 2020 22:45)          RADIOLOGY & ADDITIONAL TESTS:    Personally reviewed.     Consultant(s) Notes Reviewed:  [x] YES  [ ] NO

## 2020-12-03 NOTE — INPATIENT CERTIFICATION FOR MEDICARE PATIENTS - RISKS OF ADVERSE EVENTS
Concern for neurologic deterioration/Concern for worsening infectious process/Concern for cardiopulmonary deterioration

## 2020-12-03 NOTE — PROGRESS NOTE ADULT - ASSESSMENT
76 yo m pmh spinal stenosis, osteomyelitis of lumbar vertebrae, type 2 DM not on insulin, HTN, CAD, TIA, HLD, LISBETH, Iron deficiency anemia, thiamine deficienicy , GERD presents from [facility] admitted for suspected pneumonia now with acute hypoxic hypercapneic resp failure due to bilateral PE, pneumonia and CO2 retention 2/2 LISBETH    Acute respiratory failure with hypoxia and hypercapneic.    - Sent to ICU from the ED 12/3  - Due to bilateral PE, pneumonia and CO2 retention 2/2 LISBETH  - On BIPAP given CO2 retention  - See below for details/care per ICU  - cards and pulm consulted    Shock, likely cardiogenic  - possible cardiogenic  or  septic shock given PNA  - on vasopressors  - bed side echo shows RV strain  - f/u blood cultures    Metabolic encephalopathy likely due to hypercapnia  - On Bipap.  - care per ICU  - f/u TSH    Bilateral pulmonary embolism with RV strain  - s/p tPA 12/03, continue heparin drip  - f/u echo and LE dopplers    Pneumonia, suspect health care associated pneumonia  - Cont Vanc and Zosyn (f/u Vanc trough)  - f/u legionella, MRSA testing  - Covid negative    R/O ACS (acute coronary syndrome).   - pt presenting with sob, elevated troponin and abnormal EKG- no prior for comparison, denies chest pain  - consult cardio  - c/w ASA, statin, plavix  - monitor on remote tele.     HTN (hypertension)  - currently hypotensive  - holding antihypertensives    TIA (transient ischemic attack).   - c/w statin, plavix.      Diabetes mellitus.   - hyperglycemic on presentation.   - hold metformin   - start insulin sliding scale, hypoglycemia protocol  - f/u a1c.     Spinal stenosis.  - pt with chronic pain, states hes been bedbound x several weeks  - has pain pump he says is no longer in use (per H&P)  - discontinue oxycodone prn given mental status    HLD (hyperlipidemia).   - c/w statin.     GERD (gastroesophageal reflux disease).    - continue protonix, interchange for omeprazole.     Prophylactic measure.   dvt ppx: heparin ggt

## 2020-12-03 NOTE — CONSULT NOTE ADULT - ASSESSMENT
INCOMPLETE  74 yo m pmh spinal stenosis, osteomyelitis of lumbar vertebrae, type 2 DM not on insulin, HTN, CAD, TIA, HLD, LISBETH, Iron deficiency anemia, thiamine deficienicy , GERD presents from [facility] admit for suspected pneumonia      Abnormal EKG  - Pt with SOB  - Current EKG with ------------  - No prior EKG    Hx of TIA    HTN    CAD            INCOMPLETE  74 yo m pmh spinal stenosis, osteomyelitis of lumbar vertebrae, type 2 DM not on insulin, HTN, CAD, TIA, HLD, LISBETH, Iron deficiency anemia, thiamine deficienicy , GERD presents from [facility] admit for suspected pneumonia.    Pt initially presented with SOB and possible PNA. Pt with worsening SOB rapid response called for hypoxia and hypotension. Pt also found to be tachycardiac. POCUS with bedside ultrasound during rapid showed dilated RV, hyperdynamic LV function. Pt now on NRB, admitting to chest pain and SOB.   - F/u CTA r/o PE  - CXR: Bibasilar atelectasis/airspace disease. Correlate for infection. Small right pleural effusion.  - Current EKG with ST elevations in leads V4 to V6 and TWs, no previous EKGs for comparison   - Serial EKGs  - Troponin 0.068--> 0.059   - Continue to trend cardiac enzymes  - F/u ECHO    HTN  - Pt with rapid for hypotension   - Pt with BP improvement s/p 300cc bolus, however hold fluids due to possible acute RV failure as seen in POCUS during rapid response   - Hold anti-hypertensives for now - hold lisinopril 5mg     Hx of TIA  - Continue plavix 75 qd and atorvastatin 40mg qd    CAD  chronic  - Continue atorvastatin 40mg qd and aspirin 81mg           INCOMPLETE  74 yo m pmh spinal stenosis, osteomyelitis of lumbar vertebrae, type 2 DM not on insulin, HTN, CAD, TIA, HLD, LISBETH, Iron deficiency anemia, thiamine deficienicy , GERD presents [facility] for suspected pneumonia.    Pt initially presented with SOB and possible PNA. Initial CXR with bibasilar atelectasis/airspace disease and small right pleural effusion. Pt with worsening SOB rapid response called for hypoxia and hypotension. Pt also found to be tachycardiac. POCUS with bedside ultrasound during rapid showed dilated RV, hyperdynamic LV function. Pt now on NRB, admitting to chest pain and SOB. Pt admitted for ICU. PE is high on differential.   - F/u CTA r/o PE  - Current EKG with ST elevations in leads V4 to V6 and TWs, no previous EKGs for comparison   - Serial EKGs  - Troponin 0.068--> 0.059   - Continue to trend cardiac enzymes  - F/u ECHO    HTN  - Pt with rapid for hypotension   - Pt with BP improvement s/p 300cc bolus, however hold fluids due to possible acute RV failure as seen in POCUS during rapid response   - Hold anti-hypertensives for now - hold lisinopril 5mg     Hx of TIA  - Continue plavix 75 qd and atorvastatin 40mg qd    CAD  chronic  - Continue atorvastatin 40mg qd and aspirin 81mg           INCOMPLETE  74 yo m pmh spinal stenosis, osteomyelitis of lumbar vertebrae, type 2 DM not on insulin, HTN, CAD, TIA, HLD, LISBETH, Iron deficiency anemia, thiamine deficienicy, GERD presents [facility] for suspected pneumonia.    Pt initially presented with SOB and possible PNA. Initial CXR with bibasilar atelectasis/airspace disease and small right pleural effusion. Pt with worsening SOB rapid response called for hypoxia and hypotension. Pt also found to be tachycardiac. POCUS with bedside ultrasound during rapid showed dilated RV, hyperdynamic LV function. Pt now on NRB, admitting to chest pain and SOB. Pt admitted for ICU. Pt with PE.   - CTA: Bilateral pulmonary emboli with CT findings suggesting right heart strain. The right lower lobe is nonaerated likely representative of a combination of atelectasis and pneumonia. Small-to-moderate right pleural effusion.  - Current EKG with ST elevations in leads V4 to V6 and TWs, no previous EKGs for comparison   - Serial EKGs  - Troponin 0.068--> 0.059   - Continue to trend cardiac enzymes  - F/u ECHO    HTN  - Pt with rapid for hypotension   - Pt with BP improvement s/p 300cc bolus, however hold fluids due to possible acute RV failure as seen in POCUS during rapid response   - Hold anti-hypertensives for now - hold lisinopril 5mg     Hx of TIA  - hold plavix 75 qd if tPA given  - Continue atorvastatin 40mg qd    CAD  chronic  - Continue atorvastatin 40mg qd  - Hold aspirin 81mg if tPA given          76 yo m pmh spinal stenosis, osteomyelitis of lumbar vertebrae, type 2 DM not on insulin, HTN, CAD, TIA, HLD, LISBETH, Iron deficiency anemia, thiamine deficienicy, GERD presents from [facility] for suspected pneumonia.    Pt initially presented with SOB and possible PNA. Initial CXR with bibasilar atelectasis/airspace disease and small right pleural effusion. Pt with worsening SOB rapid response called for hypoxia and hypotension. Pt also found to be tachycardiac. POCUS with bedside ultrasound during rapid response showed dilated RV, hyperdynamic LV function. Pt placed on NRB, admitting to chest pain and SOB. Pt admitted to ICU. Current EKG with S1Q3T3 consistent with PE. CTA shows Bilateral pulmonary emboli with CT findings suggesting right heart strain and small-to-moderate right pleural effusion. tPA given. Pt now on pressors.    Recommendations   - Continue pressors and ween off as tolerated   - Troponin 0.068--> 0.059   - F/u ECHO  - Hold plavix 75 qd  - Hold aspirin 81mg qd  - Continue atorvastatin 40mg qd  - Monitor routine hemodynamics.  - Monitor and replete lytes, keep K>4, Mg>2.  - Other cardiovascular workup will depend on clinical course.  - All other workup per ICU team.  - Will continue to follow.

## 2020-12-03 NOTE — CONSULT NOTE ADULT - SUBJECTIVE AND OBJECTIVE BOX
Date/Time Patient Seen:  		  Referring MD:   Data Reviewed	       Patient is a 75y old  Male who presents with a chief complaint of Resp Distress (02 Dec 2020 21:02)      Subjective/HPI  in bed  seen and examined  vs and meds reviewed  poor historian   from [facility] reviewed  er provider note reviewed  covid neg  labs reviewed  imaging reviewed    76 yo m pmh spinal stenosis, osteomyelitis of lumbar vertebrae, type 2 DM not on insulin, HTN, CAD, TIA, HLD, LISBETH, Iron deficiency anemia, thiamine deficienicy , GERD presents from [facility]. Pt is poor historian, states he does not know why he is here. denies any pain, respiratory distress. All ROS neg. Per chart review from [facility] nurse noted that patient was not acting like himself, he appeared to be in respiratory distress. Vitals were obtained and pt was afebrile, bp WNL, but he was hypoxic in the 80's and had wheezing on exam. Nurse placed pt on O2 and gave a nebulizer treatment. Physician evaluated pt and noted pt was still in respiratory distress, c/o shortness of breath and asking to open the windows for air. Pt was on 100% NRB. He denied chest pain, abd pain, nausea or vomiting. Exam at that time revealed dec breath sounds, fine crepts @ bases, trace lower extremity edema, cold to touch +pulses. Pt repeatedly asking please help. Facility then transfered pt to ED.      Social History (marital status, living situation, occupation, tobacco use, alcohol and drug use, and sexual history): from [facility]   limited historian  PAST MEDICAL & SURGICAL HISTORY:  Bedbound    H/O osteomyelitis    GERD (gastroesophageal reflux disease)    HLD (hyperlipidemia)    HTN (hypertension)    TIA (transient ischemic attack)    Spinal stenosis          Medication list         MEDICATIONS  (STANDING):  ascorbic acid 500 milliGRAM(s) Oral daily  aspirin enteric coated 81 milliGRAM(s) Oral daily  atorvastatin 40 milliGRAM(s) Oral at bedtime  cholecalciferol 1000 Unit(s) Oral daily  clopidogrel Tablet 75 milliGRAM(s) Oral daily  dextrose 40% Gel 15 Gram(s) Oral once  dextrose 5%. 1000 milliLiter(s) (50 mL/Hr) IV Continuous <Continuous>  dextrose 5%. 1000 milliLiter(s) (100 mL/Hr) IV Continuous <Continuous>  dextrose 50% Injectable 25 Gram(s) IV Push once  dextrose 50% Injectable 12.5 Gram(s) IV Push once  dextrose 50% Injectable 25 Gram(s) IV Push once  enoxaparin Injectable 40 milliGRAM(s) SubCutaneous daily  glucagon  Injectable 1 milliGRAM(s) IntraMuscular once  insulin lispro (ADMELOG) corrective regimen sliding scale   SubCutaneous three times a day before meals  insulin lispro (ADMELOG) corrective regimen sliding scale   SubCutaneous at bedtime  lisinopril 5 milliGRAM(s) Oral daily  pantoprazole    Tablet 40 milliGRAM(s) Oral before breakfast  piperacillin/tazobactam IVPB.. 3.375 Gram(s) IV Intermittent every 8 hours  rOPINIRole 0.25 milliGRAM(s) Oral two times a day  thiamine 100 milliGRAM(s) Oral daily  vancomycin  IVPB 1000 milliGRAM(s) IV Intermittent every 12 hours    MEDICATIONS  (PRN):  methocarbamol 500 milliGRAM(s) Oral every 6 hours PRN Muscle Spasm  oxyCODONE    IR 5 milliGRAM(s) Oral three times a day PRN Severe Pain (7 - 10)         Vitals log        ICU Vital Signs Last 24 Hrs  T(C): 37.2 (03 Dec 2020 06:15), Max: 37.8 (02 Dec 2020 18:43)  T(F): 99 (03 Dec 2020 06:15), Max: 100 (02 Dec 2020 18:43)  HR: 109 (03 Dec 2020 06:15) (90 - 109)  BP: 80/52 (03 Dec 2020 06:15) (80/52 - 131/72)  BP(mean): --  ABP: --  ABP(mean): --  RR: 22 (03 Dec 2020 06:15) (18 - 22)  SpO2: 98% (03 Dec 2020 06:15) (97% - 100%)           Input and Output:  I&O's Detail      Lab Data                        11.4   14.20 )-----------( 305      ( 03 Dec 2020 06:22 )             38.4     12-03    136  |  95<L>  |  57<H>  ----------------------------<  324<H>  5.2   |  32<H>  |  0.85    Ca    8.5      03 Dec 2020 06:22  Mg     2.1     12-02    TPro  7.2  /  Alb  3.6  /  TBili  0.6  /  DBili  x   /  AST  22  /  ALT  50  /  AlkPhos  92  12-02      CARDIAC MARKERS ( 03 Dec 2020 01:15 )  .059 ng/mL / x     / 14 U/L / x     / 1.8 ng/mL  CARDIAC MARKERS ( 02 Dec 2020 18:46 )  .068 ng/mL / x     / x     / x     / 1.9 ng/mL        Review of Systems	  sob  blanca      Objective     Physical Examination    on RA  head nc  head at  heart s1s2  lung dec BS  abd soft      Pertinent Lab findings & Imaging      Neri:  NO   Adequate UO     I&O's Detail           Discussed with:     Cultures:	        Radiology

## 2020-12-03 NOTE — CONSULT NOTE ADULT - SUBJECTIVE AND OBJECTIVE BOX
Patient is a 75y old  Male who presents with a chief complaint of Resp Distress (03 Dec 2020 08:30)      BRIEF HOSPITAL COURSE:   Pt is a 74 y/o male with PMHx of HTN, HLD, DM, CAD, prior TIA, spinal stenosis, OM of lumbar vertebrae, LISBETH, iron deficiency anemia, thiamine deficiency, GERD presented from [place] [facility] with respiratory distress. Noted to be altered, hypoxic to 80s, placed on NC o2 by [facility] staff, also given nebulizer treatment without improvement. Pt c/o difficulty breathing, subsequently placed on 100% NRB and transferred to [facility] ED. In ED, pt initially appeared improved, put on room air saturating well. Afebrile in ED. Labs revealing leukocytosis, ddimer 500, trop 0.068--> 0.059, BNP 15k. CXR w/ bibasilar atelectasis vs infiltrative process.     S/p RRT for AMS, hypoxia, hypotension. Seen and examined at bedside, pt arousable to painful stimuli, lethargic SBP in 80s, repeat in 90s, sinus tach low 100s, Spo2 mid 80s, improved to 90s on 100% NRB. Bedside POCUS with a-line predominant lung fields, no pleural effusions, cardiac pocus with massive RV with septal bowing into LV, apex not visible, unable to assess for Rodriguez's.     PAST MEDICAL & SURGICAL HISTORY:  Bedbound    H/O osteomyelitis    GERD (gastroesophageal reflux disease)    HLD (hyperlipidemia)    HTN (hypertension)    TIA (transient ischemic attack)    Spinal stenosis      Allergies    No Known Allergies    Intolerances      FAMILY HISTORY: Unable to perform due to AMS    SOCIAL HISTORY: Unable to perform due to AMS    Review of Systems:  Unable to perform due to AMS    Medications:  piperacillin/tazobactam IVPB.. 3.375 Gram(s) IV Intermittent every 8 hours  vancomycin  IVPB 1000 milliGRAM(s) IV Intermittent every 12 hours    lisinopril 5 milliGRAM(s) Oral daily      methocarbamol 500 milliGRAM(s) Oral every 6 hours PRN  oxyCODONE    IR 5 milliGRAM(s) Oral three times a day PRN  rOPINIRole 0.25 milliGRAM(s) Oral two times a day      aspirin enteric coated 81 milliGRAM(s) Oral daily  clopidogrel Tablet 75 milliGRAM(s) Oral daily  enoxaparin Injectable 40 milliGRAM(s) SubCutaneous daily    pantoprazole    Tablet 40 milliGRAM(s) Oral before breakfast      atorvastatin 40 milliGRAM(s) Oral at bedtime  dextrose 40% Gel 15 Gram(s) Oral once  dextrose 50% Injectable 25 Gram(s) IV Push once  dextrose 50% Injectable 12.5 Gram(s) IV Push once  dextrose 50% Injectable 25 Gram(s) IV Push once  glucagon  Injectable 1 milliGRAM(s) IntraMuscular once  insulin lispro (ADMELOG) corrective regimen sliding scale   SubCutaneous three times a day before meals  insulin lispro (ADMELOG) corrective regimen sliding scale   SubCutaneous at bedtime    ascorbic acid 500 milliGRAM(s) Oral daily  cholecalciferol 1000 Unit(s) Oral daily  dextrose 5%. 1000 milliLiter(s) IV Continuous <Continuous>  dextrose 5%. 1000 milliLiter(s) IV Continuous <Continuous>  thiamine 100 milliGRAM(s) Oral daily                ICU Vital Signs Last 24 Hrs  T(C): 37.2 (03 Dec 2020 06:15), Max: 37.8 (02 Dec 2020 18:43)  T(F): 99 (03 Dec 2020 06:15), Max: 100 (02 Dec 2020 18:43)  HR: 100 (03 Dec 2020 07:52) (90 - 109)  BP: 62/42 (03 Dec 2020 07:52) (62/42 - 131/72)  BP(mean): --  ABP: --  ABP(mean): --  RR: 20 (03 Dec 2020 07:52) (18 - 22)  SpO2: 98% (03 Dec 2020 07:52) (97% - 100%)    Vital Signs Last 24 Hrs  T(C): 37.2 (03 Dec 2020 06:15), Max: 37.8 (02 Dec 2020 18:43)  T(F): 99 (03 Dec 2020 06:15), Max: 100 (02 Dec 2020 18:43)  HR: 100 (03 Dec 2020 07:52) (90 - 109)  BP: 62/42 (03 Dec 2020 07:52) (62/42 - 131/72)  BP(mean): --  RR: 20 (03 Dec 2020 07:52) (18 - 22)  SpO2: 98% (03 Dec 2020 07:52) (97% - 100%)        I&O's Detail        LABS:                        11.4   14.20 )-----------( 305      ( 03 Dec 2020 06:22 )             38.4     12-03    136  |  95<L>  |  57<H>  ----------------------------<  324<H>  5.2   |  32<H>  |  0.85    Ca    8.5      03 Dec 2020 06:22  Mg     2.1     12-02    TPro  7.2  /  Alb  3.6  /  TBili  0.6  /  DBili  x   /  AST  22  /  ALT  50  /  AlkPhos  92  12-02      CARDIAC MARKERS ( 03 Dec 2020 01:15 )  .059 ng/mL / x     / 14 U/L / x     / 1.8 ng/mL  CARDIAC MARKERS ( 02 Dec 2020 18:46 )  .068 ng/mL / x     / x     / x     / 1.9 ng/mL      CAPILLARY BLOOD GLUCOSE      POCT Blood Glucose.: 268 mg/dL (02 Dec 2020 22:45)    PT/INR - ( 02 Dec 2020 18:46 )   PT: 12.1 sec;   INR: 1.04 ratio         PTT - ( 02 Dec 2020 18:46 )  PTT:24.8 sec  Urinalysis Basic - ( 02 Dec 2020 18:46 )    Color: Yellow / Appearance: Clear / S.015 / pH: x  Gluc: x / Ketone: Negative  / Bili: Negative / Urobili: Negative   Blood: x / Protein: 15 / Nitrite: Negative   Leuk Esterase: Small / RBC: x / WBC 0-2   Sq Epi: x / Non Sq Epi: Occasional / Bacteria: x      CULTURES: performed, f/u      Physical Examination:    General: toxic appearing, encephalopathic    HEENT: Pupils equal, reactive to light.  Symmetric. No scleral icterus or injection    PULM: Clear to auscultation bilaterally, no wheezes, rales or rhonchi, no significant sputum production or increased respiratory effort    NECK: Supple, no lymphadenopathy, trachea midline    CVS: sinus tach low 100s, no murmurs, +s1/s2    ABD: Soft, nondistended, nontender, normoactive bowel sounds    EXT: No edema b/l LE, b/l upper extremity pitting edema, no anasarca, nontender    SKIN: Warm and well perfused    NEURO: Alert, arouses to tactile/painful stimuli, spoke in 1 word answers but unable to answer questions or hold a conversation, nonfocal equal withdrawal to all 4 extremities     DEVICES: N  LINES: N   HARDY: N    POCUS:  See HPI    RADIOLOGY: CXR bibasilar atelectasis vs infiltrative process, b/ opacities, R>L Patient is a 75y old  Male who presents with a chief complaint of Resp Distress (03 Dec 2020 08:30)      BRIEF HOSPITAL COURSE:   Pt is a 76 y/o male with PMHx of HTN, HLD, DM, CAD, prior TIA, spinal stenosis, OM of lumbar vertebrae, LISBETH, iron deficiency anemia, thiamine deficiency, GERD presented from [place] [facility] with respiratory distress. Noted to be altered, hypoxic to 80s, placed on NC o2 by [facility] staff, also given nebulizer treatment without improvement. Pt c/o difficulty breathing, subsequently placed on 100% NRB and transferred to [facility] ED. In ED, pt initially appeared improved, put on room air saturating well. Afebrile in ED. Labs revealing leukocytosis, ddimer 500, trop 0.068--> 0.059, BNP 15k. CXR w/ bibasilar atelectasis vs infiltrative process.     S/p RRT for AMS, hypoxia, hypotension. Seen and examined at bedside, pt arousable to painful stimuli, lethargic SBP in 80s, repeat in 90s, sinus tach low 100s, Spo2 mid 80s, improved to 90s on 100% NRB. Bedside POCUS with a-line predominant lung fields, no pleural effusion on left, difficult to assess right, possible trace to small w/ questionable compressive atelectasis, cardiac pocus with massive RV with septal bowing into LV, apex not visible, unable to assess for Rodriguez's.     PAST MEDICAL & SURGICAL HISTORY:  Bedbound    H/O osteomyelitis    GERD (gastroesophageal reflux disease)    HLD (hyperlipidemia)    HTN (hypertension)    TIA (transient ischemic attack)    Spinal stenosis      Allergies    No Known Allergies    Intolerances      FAMILY HISTORY: Unable to perform due to AMS    SOCIAL HISTORY: Unable to perform due to AMS    Review of Systems:  Unable to perform due to AMS    Medications:  piperacillin/tazobactam IVPB.. 3.375 Gram(s) IV Intermittent every 8 hours  vancomycin  IVPB 1000 milliGRAM(s) IV Intermittent every 12 hours    lisinopril 5 milliGRAM(s) Oral daily      methocarbamol 500 milliGRAM(s) Oral every 6 hours PRN  oxyCODONE    IR 5 milliGRAM(s) Oral three times a day PRN  rOPINIRole 0.25 milliGRAM(s) Oral two times a day      aspirin enteric coated 81 milliGRAM(s) Oral daily  clopidogrel Tablet 75 milliGRAM(s) Oral daily  enoxaparin Injectable 40 milliGRAM(s) SubCutaneous daily    pantoprazole    Tablet 40 milliGRAM(s) Oral before breakfast      atorvastatin 40 milliGRAM(s) Oral at bedtime  dextrose 40% Gel 15 Gram(s) Oral once  dextrose 50% Injectable 25 Gram(s) IV Push once  dextrose 50% Injectable 12.5 Gram(s) IV Push once  dextrose 50% Injectable 25 Gram(s) IV Push once  glucagon  Injectable 1 milliGRAM(s) IntraMuscular once  insulin lispro (ADMELOG) corrective regimen sliding scale   SubCutaneous three times a day before meals  insulin lispro (ADMELOG) corrective regimen sliding scale   SubCutaneous at bedtime    ascorbic acid 500 milliGRAM(s) Oral daily  cholecalciferol 1000 Unit(s) Oral daily  dextrose 5%. 1000 milliLiter(s) IV Continuous <Continuous>  dextrose 5%. 1000 milliLiter(s) IV Continuous <Continuous>  thiamine 100 milliGRAM(s) Oral daily                ICU Vital Signs Last 24 Hrs  T(C): 37.2 (03 Dec 2020 06:15), Max: 37.8 (02 Dec 2020 18:43)  T(F): 99 (03 Dec 2020 06:15), Max: 100 (02 Dec 2020 18:43)  HR: 100 (03 Dec 2020 07:52) (90 - 109)  BP: 62/42 (03 Dec 2020 07:52) (62/42 - 131/72)  BP(mean): --  ABP: --  ABP(mean): --  RR: 20 (03 Dec 2020 07:52) (18 - 22)  SpO2: 98% (03 Dec 2020 07:52) (97% - 100%)    Vital Signs Last 24 Hrs  T(C): 37.2 (03 Dec 2020 06:15), Max: 37.8 (02 Dec 2020 18:43)  T(F): 99 (03 Dec 2020 06:15), Max: 100 (02 Dec 2020 18:43)  HR: 100 (03 Dec 2020 07:52) (90 - 109)  BP: 62/42 (03 Dec 2020 07:52) (62/42 - 131/72)  BP(mean): --  RR: 20 (03 Dec 2020 07:52) (18 - 22)  SpO2: 98% (03 Dec 2020 07:52) (97% - 100%)        I&O's Detail        LABS:                        11.4   14.20 )-----------( 305      ( 03 Dec 2020 06:22 )             38.4     12-03    136  |  95<L>  |  57<H>  ----------------------------<  324<H>  5.2   |  32<H>  |  0.85    Ca    8.5      03 Dec 2020 06:22  Mg     2.1     12-02    TPro  7.2  /  Alb  3.6  /  TBili  0.6  /  DBili  x   /  AST  22  /  ALT  50  /  AlkPhos  92  12-02      CARDIAC MARKERS ( 03 Dec 2020 01:15 )  .059 ng/mL / x     / 14 U/L / x     / 1.8 ng/mL  CARDIAC MARKERS ( 02 Dec 2020 18:46 )  .068 ng/mL / x     / x     / x     / 1.9 ng/mL      CAPILLARY BLOOD GLUCOSE      POCT Blood Glucose.: 268 mg/dL (02 Dec 2020 22:45)    PT/INR - ( 02 Dec 2020 18:46 )   PT: 12.1 sec;   INR: 1.04 ratio         PTT - ( 02 Dec 2020 18:46 )  PTT:24.8 sec  Urinalysis Basic - ( 02 Dec 2020 18:46 )    Color: Yellow / Appearance: Clear / S.015 / pH: x  Gluc: x / Ketone: Negative  / Bili: Negative / Urobili: Negative   Blood: x / Protein: 15 / Nitrite: Negative   Leuk Esterase: Small / RBC: x / WBC 0-2   Sq Epi: x / Non Sq Epi: Occasional / Bacteria: x      CULTURES: performed, f/u      Physical Examination:    General: toxic appearing, encephalopathic    HEENT: Pupils equal, reactive to light.  Symmetric. No scleral icterus or injection    PULM: Clear to auscultation bilaterally, no wheezes, rales or rhonchi, no significant sputum production or increased respiratory effort    NECK: Supple, no lymphadenopathy, trachea midline    CVS: sinus tach low 100s, no murmurs, +s1/s2    ABD: Soft, nondistended, nontender, normoactive bowel sounds    EXT: No edema b/l LE, b/l upper extremity pitting edema, no anasarca, nontender    SKIN: Warm and well perfused    NEURO: Alert, arouses to tactile/painful stimuli, spoke in 1 word answers but unable to answer questions or hold a conversation, nonfocal equal withdrawal to all 4 extremities     DEVICES: N  LINES: N   HARDY: N    POCUS:  See HPI    RADIOLOGY: CXR bibasilar atelectasis vs infiltrative process, b/ opacities, R>L

## 2020-12-03 NOTE — CONSULT NOTE ADULT - SUBJECTIVE AND OBJECTIVE BOX
[facility]         Stacey Du, Luis Fernando, Bronwyn, Evangelist, Sivakumar Adame        [phone] (office)    Reason for Consult: abnormal EKG    Interval HPI: Patient seen and examined at bedside.     HPI:  74 yo m pmh spinal stenosis, osteomyelitis of lumbar vertebrae, type 2 DM not on insulin, HTN, CAD, TIA, HLD, LISBETH, Iron deficiency anemia, thiamine deficienicy , GERD presents from [place] [facility]. Pt is poor historian, states he does not know why he is here. denies any pain, respiratory distress. All ROS neg. Per chart review from [employer] nurse noted that patient was not acting like himself, he appeared to be in respiratory distress. Vitals were obtained and pt was afebrile, bp WNL, but he was hypoxic in the 80's and had wheezing on exam. Nurse placed pt on O2 and gave a nebulizer treatment. Physician evaluated pt and noted pt was still in respiratory distress, c/o shortness of breath and asking to open the windows for air. Pt was on 100% NRB. He denied chest pain, abd pain, nausea or vomiting. Exam at that time revealed dec breath sounds, fine crepts @ bases, trace lower extremity edema, cold to touch +pulses. Pt repeatedly asking please help. Facility then transfered pt to ED.     In the ED pt vital signs were wnl. He was no longer complaining of shortness of breath. he was sating high 90's - 100% on RA.  Labs obtained revealed WBC 13.21, H&H 12.5/41.0, Co2 32, Bun 55, glucose 265, troponin .068, probnp 47601, UA grossly WNL. RVP neg. Pt was given vancomycin and zosyn x 1. 1L NS. Tylenol 650mg x 1.    (02 Dec 2020 21:02)      PAST MEDICAL & SURGICAL HISTORY:  Bedbound    H/O osteomyelitis    GERD (gastroesophageal reflux disease)    HLD (hyperlipidemia)    HTN (hypertension)    TIA (transient ischemic attack)    Spinal stenosis        SOCIAL HISTORY: No active tobacco, alcohol or illicit drug use    FAMILY HISTORY:      Home Medications:  acetaminophen 325 mg oral tablet: 2 tab(s) orally every 8 hours, As Needed (02 Dec 2020 20:54)  ascorbic acid 500 mg oral tablet: 1 tab(s) orally once a day (02 Dec 2020 20:54)  aspirin 81 mg oral tablet: 1 tab(s) orally once a day (02 Dec 2020 20:54)  atorvastatin 40 mg oral tablet: 1 tab(s) orally once a day (02 Dec 2020 20:55)  clopidogrel 75 mg oral tablet: 1 tab(s) orally once a day (02 Dec 2020 20:55)  lisinopril 5 mg oral tablet: 1 tab(s) orally once a day (02 Dec 2020 20:54)  metFORMIN 500 mg oral tablet: 1 tab(s) orally 2 times a day (02 Dec 2020 20:54)  methocarbamol 500 mg oral tablet: 1 tab(s) orally every 6 hours, As Needed (02 Dec 2020 20:54)  omeprazole 40 mg oral delayed release capsule: 1 cap(s) orally once a day (02 Dec 2020 20:55)  oxyCODONE 5 mg oral tablet: 1 tab(s) orally 3 times a day (02 Dec 2020 20:55)  Requip 0.25 mg oral tablet: 1 tab(s) orally 2 times a day (02 Dec 2020 20:55)  thiamine 100 mg oral tablet: 1 tab(s) orally once a day (02 Dec 2020 20:55)  Vitamin B12 50 mcg oral tablet: 2 tab(s) orally once a day (02 Dec 2020 20:55)  Vitamin D3 1000 intl units (25 mcg) oral tablet: 1 tab(s) orally once a day (02 Dec 2020 20:55)      MEDICATIONS  (STANDING):  ascorbic acid 500 milliGRAM(s) Oral daily  aspirin enteric coated 81 milliGRAM(s) Oral daily  atorvastatin 40 milliGRAM(s) Oral at bedtime  cholecalciferol 1000 Unit(s) Oral daily  clopidogrel Tablet 75 milliGRAM(s) Oral daily  dextrose 40% Gel 15 Gram(s) Oral once  dextrose 5%. 1000 milliLiter(s) (50 mL/Hr) IV Continuous <Continuous>  dextrose 5%. 1000 milliLiter(s) (100 mL/Hr) IV Continuous <Continuous>  dextrose 50% Injectable 25 Gram(s) IV Push once  dextrose 50% Injectable 12.5 Gram(s) IV Push once  dextrose 50% Injectable 25 Gram(s) IV Push once  enoxaparin Injectable 40 milliGRAM(s) SubCutaneous daily  glucagon  Injectable 1 milliGRAM(s) IntraMuscular once  insulin lispro (ADMELOG) corrective regimen sliding scale   SubCutaneous three times a day before meals  insulin lispro (ADMELOG) corrective regimen sliding scale   SubCutaneous at bedtime  lisinopril 5 milliGRAM(s) Oral daily  pantoprazole    Tablet 40 milliGRAM(s) Oral before breakfast  piperacillin/tazobactam IVPB.. 3.375 Gram(s) IV Intermittent every 8 hours  rOPINIRole 0.25 milliGRAM(s) Oral two times a day  thiamine 100 milliGRAM(s) Oral daily  vancomycin  IVPB 1000 milliGRAM(s) IV Intermittent every 12 hours    MEDICATIONS  (PRN):  methocarbamol 500 milliGRAM(s) Oral every 6 hours PRN Muscle Spasm  oxyCODONE    IR 5 milliGRAM(s) Oral three times a day PRN Severe Pain (7 - 10)      Allergies    No Known Allergies    Intolerances        REVIEW OF SYSTEMS: Negative except as per HPI.    VITAL SIGNS:   Vital Signs Last 24 Hrs  T(C): 37.2 (03 Dec 2020 06:15), Max: 37.8 (02 Dec 2020 18:43)  T(F): 99 (03 Dec 2020 06:15), Max: 100 (02 Dec 2020 18:43)  HR: 100 (03 Dec 2020 07:52) (90 - 109)  BP: 62/42 (03 Dec 2020 07:52) (62/42 - 131/72)  BP(mean): --  RR: 20 (03 Dec 2020 07:52) (18 - 22)  SpO2: 98% (03 Dec 2020 07:52) (97% - 100%)    I&O's Summary      PHYSICAL EXAM:  Constitutional: NAD, well-developed  HEENT NC/AT, moist mucous membranes  Pulmonary: Non-labored, breath sounds are clear bilaterally, no wheezing, rales or rhonchi  Cardiovascular: +S1, S2, RRR, no murmur  Gastrointestinal: Soft, nontender, nondistended, normoactive bowel sounds  Extremities: No peripheral edema   Neurological: Alert, strength and sensitivity are grossly intact  Skin: No obvious lesions/rashes  Psych: Mood & affect appropriate    LABS: All Labs Reviewed:                        11.4   14.20 )-----------( 305      ( 03 Dec 2020 06:22 )             38.4                         12.5   13.21 )-----------( 314      ( 02 Dec 2020 18:46 )             41.0     03 Dec 2020 06:22    136    |  95     |  57     ----------------------------<  324    5.2     |  32     |  0.85   02 Dec 2020 18:46    135    |  97     |  55     ----------------------------<  265    5.3     |  32     |  0.78     Ca    8.5        03 Dec 2020 06:22  Ca    8.4        02 Dec 2020 18:46  Mg     2.1       02 Dec 2020 18:46    TPro  7.2    /  Alb  3.6    /  TBili  0.6    /  DBili  x      /  AST  22     /  ALT  50     /  AlkPhos  92     02 Dec 2020 18:46    PT/INR - ( 02 Dec 2020 18:46 )   PT: 12.1 sec;   INR: 1.04 ratio         PTT - ( 02 Dec 2020 18:46 )  PTT:24.8 sec  CARDIAC MARKERS ( 03 Dec 2020 01:15 )  .059 ng/mL / x     / 14 U/L / x     / 1.8 ng/mL  CARDIAC MARKERS ( 02 Dec 2020 18:46 )  .068 ng/mL / x     / x     / x     / 1.9 ng/mL      Blood Culture:   12-02 @ 18:46  Pro Bnp [id number]    12-02 @ 18:46  TSH: 2.60      EKG:    RADIOLOGY:    CXR:   [facility]         Stacey Du, Luis Fernando, Bronwyn, Evangelist, Deep, Sivakumar        [phone] (office)    Reason for Consult: abnormal EKG    Interval HPI: Patient seen and examined at bedside. Pt with rapid response in AM for hypoxia and hypotension. BP at time was 62/42, improvement of BP after 300cc fluids. Pt transitioned from Nasal canula to NRB. Pt is poor historian but admits to CP and shortness of breath. As per rapid note: bedside ultrasound during rapid showed dilated RV, hyperdynamic LV function. Pt for CTA to r/o PE.    - EKG with ST elevations in leads V4 to V6, no previous EKGs for comparison     HPI:  74 yo m pmh spinal stenosis, osteomyelitis of lumbar vertebrae, type 2 DM not on insulin, HTN, CAD, TIA, HLD, LISBETH, Iron deficiency anemia, thiamine deficiency, GERD presents from [facility]. Pt is poor historian, states he does not know why he is here. denies any pain, respiratory distress. All ROS neg. Per chart review from [employer] nurse noted that patient was not acting like himself, he appeared to be in respiratory distress. Vitals were obtained and pt was afebrile, bp WNL, but he was hypoxic in the 80's and had wheezing on exam. Nurse placed pt on O2 and gave a nebulizer treatment. Physician evaluated pt and noted pt was still in respiratory distress, c/o shortness of breath and asking to open the windows for air. Pt was on 100% NRB. He denied chest pain, abd pain, nausea or vomiting. Exam at that time revealed dec breath sounds, fine crepts @ bases, trace lower extremity edema, cold to touch +pulses. Pt repeatedly asking please help. Facility then transferred pt to ED.     In the ED pt vital signs were wnl. He was no longer complaining of shortness of breath. he was sating high 90's - 100% on RA.  Labs obtained revealed WBC 13.21, H&H 12.5/41.0, Co2 32, Bun 55, glucose 265, troponin .068, probnp 61413, UA grossly WNL. RVP neg. Pt was given vancomycin and zosyn x 1. 1L NS. Tylenol 650mg x 1.    (02 Dec 2020 21:02)      PAST MEDICAL & SURGICAL HISTORY:  Bedbound    H/O osteomyelitis    GERD (gastroesophageal reflux disease)    HLD (hyperlipidemia)    HTN (hypertension)    TIA (transient ischemic attack)    Spinal stenosis        SOCIAL HISTORY: No active tobacco, alcohol or illicit drug use    FAMILY HISTORY:      Home Medications:  acetaminophen 325 mg oral tablet: 2 tab(s) orally every 8 hours, As Needed (02 Dec 2020 20:54)  ascorbic acid 500 mg oral tablet: 1 tab(s) orally once a day (02 Dec 2020 20:54)  aspirin 81 mg oral tablet: 1 tab(s) orally once a day (02 Dec 2020 20:54)  atorvastatin 40 mg oral tablet: 1 tab(s) orally once a day (02 Dec 2020 20:55)  clopidogrel 75 mg oral tablet: 1 tab(s) orally once a day (02 Dec 2020 20:55)  lisinopril 5 mg oral tablet: 1 tab(s) orally once a day (02 Dec 2020 20:54)  metFORMIN 500 mg oral tablet: 1 tab(s) orally 2 times a day (02 Dec 2020 20:54)  methocarbamol 500 mg oral tablet: 1 tab(s) orally every 6 hours, As Needed (02 Dec 2020 20:54)  omeprazole 40 mg oral delayed release capsule: 1 cap(s) orally once a day (02 Dec 2020 20:55)  oxyCODONE 5 mg oral tablet: 1 tab(s) orally 3 times a day (02 Dec 2020 20:55)  Requip 0.25 mg oral tablet: 1 tab(s) orally 2 times a day (02 Dec 2020 20:55)  thiamine 100 mg oral tablet: 1 tab(s) orally once a day (02 Dec 2020 20:55)  Vitamin B12 50 mcg oral tablet: 2 tab(s) orally once a day (02 Dec 2020 20:55)  Vitamin D3 1000 intl units (25 mcg) oral tablet: 1 tab(s) orally once a day (02 Dec 2020 20:55)      MEDICATIONS  (STANDING):  ascorbic acid 500 milliGRAM(s) Oral daily  aspirin enteric coated 81 milliGRAM(s) Oral daily  atorvastatin 40 milliGRAM(s) Oral at bedtime  cholecalciferol 1000 Unit(s) Oral daily  clopidogrel Tablet 75 milliGRAM(s) Oral daily  dextrose 40% Gel 15 Gram(s) Oral once  dextrose 5%. 1000 milliLiter(s) (50 mL/Hr) IV Continuous <Continuous>  dextrose 5%. 1000 milliLiter(s) (100 mL/Hr) IV Continuous <Continuous>  dextrose 50% Injectable 25 Gram(s) IV Push once  dextrose 50% Injectable 12.5 Gram(s) IV Push once  dextrose 50% Injectable 25 Gram(s) IV Push once  enoxaparin Injectable 40 milliGRAM(s) SubCutaneous daily  glucagon  Injectable 1 milliGRAM(s) IntraMuscular once  insulin lispro (ADMELOG) corrective regimen sliding scale   SubCutaneous three times a day before meals  insulin lispro (ADMELOG) corrective regimen sliding scale   SubCutaneous at bedtime  lisinopril 5 milliGRAM(s) Oral daily  pantoprazole    Tablet 40 milliGRAM(s) Oral before breakfast  piperacillin/tazobactam IVPB.. 3.375 Gram(s) IV Intermittent every 8 hours  rOPINIRole 0.25 milliGRAM(s) Oral two times a day  thiamine 100 milliGRAM(s) Oral daily  vancomycin  IVPB 1000 milliGRAM(s) IV Intermittent every 12 hours    MEDICATIONS  (PRN):  methocarbamol 500 milliGRAM(s) Oral every 6 hours PRN Muscle Spasm  oxyCODONE    IR 5 milliGRAM(s) Oral three times a day PRN Severe Pain (7 - 10)      Allergies    No Known Allergies    Intolerances        REVIEW OF SYSTEMS: Negative except as per HPI.    VITAL SIGNS:   Vital Signs Last 24 Hrs  T(C): 37.2 (03 Dec 2020 06:15), Max: 37.8 (02 Dec 2020 18:43)  T(F): 99 (03 Dec 2020 06:15), Max: 100 (02 Dec 2020 18:43)  HR: 100 (03 Dec 2020 07:52) (90 - 109)  BP: 62/42 (03 Dec 2020 07:52) (62/42 - 131/72)  BP(mean): --  RR: 20 (03 Dec 2020 07:52) (18 - 22)  SpO2: 98% (03 Dec 2020 07:52) (97% - 100%)    I&O's Summary      PHYSICAL EXAM:  Constitutional: Pt in distress  HEENT NC/AT, moist mucous membranes  Pulmonary: Diminished breath sounds throughout  Cardiovascular: +S1, S2, RRR, no murmur  Gastrointestinal: Soft, nontender, nondistended, normoactive bowel sounds  Extremities: No peripheral edema   Neurological: Alert, answers some questions with 1 or 2 words - poor historian  Skin: No obvious lesions/rashes    LABS: All Labs Reviewed:                        11.4   14.20 )-----------( 305      ( 03 Dec 2020 06:22 )             38.4                         12.5   13.21 )-----------( 314      ( 02 Dec 2020 18:46 )             41.0     03 Dec 2020 06:22    136    |  95     |  57     ----------------------------<  324    5.2     |  32     |  0.85   02 Dec 2020 18:46    135    |  97     |  55     ----------------------------<  265    5.3     |  32     |  0.78     Ca    8.5        03 Dec 2020 06:22  Ca    8.4        02 Dec 2020 18:46  Mg     2.1       02 Dec 2020 18:46    TPro  7.2    /  Alb  3.6    /  TBili  0.6    /  DBili  x      /  AST  22     /  ALT  50     /  AlkPhos  92     02 Dec 2020 18:46    PT/INR - ( 02 Dec 2020 18:46 )   PT: 12.1 sec;   INR: 1.04 ratio         PTT - ( 02 Dec 2020 18:46 )  PTT:24.8 sec  CARDIAC MARKERS ( 03 Dec 2020 01:15 )  .059 ng/mL / x     / 14 U/L / x     / 1.8 ng/mL  CARDIAC MARKERS ( 02 Dec 2020 18:46 )  .068 ng/mL / x     / x     / x     / 1.9 ng/mL      Blood Culture:   12-02 @ 18:46  Pro Bnp [id number]    12-02 @ 18:46  TSH: 2.60      EKG:    RADIOLOGY:    CXR:   Impression: Bibasilar atelectasis/airspace disease. Correlate for infection. Small right pleural effusion.     [facility]         Stacey Du, Luis Fernando, Bronwyn, Evangelist, Deep, Sivakumar        [phone] (office)    Reason for Consult: abnormal EKG    Interval HPI: Patient seen and examined at bedside. Pt with rapid response in AM for hypoxia and hypotension. BP at time was 62/42, improvement of BP after 300cc fluids. Pt transitioned from Nasal canula to NRB. Pt is poor historian but admits to CP and shortness of breath. As per rapid note: bedside ultrasound during rapid showed dilated RV, hyperdynamic LV function. Pt for CTA to r/o PE.    - EKG with ST elevations in leads V4 to V6, no previous EKGs for comparison     HPI:  76 yo m pmh spinal stenosis, osteomyelitis of lumbar vertebrae, type 2 DM not on insulin, HTN, CAD, TIA, HLD, LISBETH, Iron deficiency anemia, thiamine deficiency, GERD presents from [facility]. Pt is poor historian, states he does not know why he is here. denies any pain, respiratory distress. All ROS neg. Per chart review from [employer] nurse noted that patient was not acting like himself, he appeared to be in respiratory distress. Vitals were obtained and pt was afebrile, bp WNL, but he was hypoxic in the 80's and had wheezing on exam. Nurse placed pt on O2 and gave a nebulizer treatment. Physician evaluated pt and noted pt was still in respiratory distress, c/o shortness of breath and asking to open the windows for air. Pt was on 100% NRB. He denied chest pain, abd pain, nausea or vomiting. Exam at that time revealed dec breath sounds, fine crepts @ bases, trace lower extremity edema, cold to touch +pulses. Pt repeatedly asking please help. Facility then transferred pt to ED.     In the ED pt vital signs were wnl. He was no longer complaining of shortness of breath. he was sating high 90's - 100% on RA.  Labs obtained revealed WBC 13.21, H&H 12.5/41.0, Co2 32, Bun 55, glucose 265, troponin .068, probnp 98891, UA grossly WNL. RVP neg. Pt was given vancomycin and zosyn x 1. 1L NS. Tylenol 650mg x 1.    (02 Dec 2020 21:02)      PAST MEDICAL & SURGICAL HISTORY:  Bedbound    H/O osteomyelitis    GERD (gastroesophageal reflux disease)    HLD (hyperlipidemia)    HTN (hypertension)    TIA (transient ischemic attack)    Spinal stenosis        SOCIAL HISTORY: No active tobacco, alcohol or illicit drug use    FAMILY HISTORY:      Home Medications:  acetaminophen 325 mg oral tablet: 2 tab(s) orally every 8 hours, As Needed (02 Dec 2020 20:54)  ascorbic acid 500 mg oral tablet: 1 tab(s) orally once a day (02 Dec 2020 20:54)  aspirin 81 mg oral tablet: 1 tab(s) orally once a day (02 Dec 2020 20:54)  atorvastatin 40 mg oral tablet: 1 tab(s) orally once a day (02 Dec 2020 20:55)  clopidogrel 75 mg oral tablet: 1 tab(s) orally once a day (02 Dec 2020 20:55)  lisinopril 5 mg oral tablet: 1 tab(s) orally once a day (02 Dec 2020 20:54)  metFORMIN 500 mg oral tablet: 1 tab(s) orally 2 times a day (02 Dec 2020 20:54)  methocarbamol 500 mg oral tablet: 1 tab(s) orally every 6 hours, As Needed (02 Dec 2020 20:54)  omeprazole 40 mg oral delayed release capsule: 1 cap(s) orally once a day (02 Dec 2020 20:55)  oxyCODONE 5 mg oral tablet: 1 tab(s) orally 3 times a day (02 Dec 2020 20:55)  Requip 0.25 mg oral tablet: 1 tab(s) orally 2 times a day (02 Dec 2020 20:55)  thiamine 100 mg oral tablet: 1 tab(s) orally once a day (02 Dec 2020 20:55)  Vitamin B12 50 mcg oral tablet: 2 tab(s) orally once a day (02 Dec 2020 20:55)  Vitamin D3 1000 intl units (25 mcg) oral tablet: 1 tab(s) orally once a day (02 Dec 2020 20:55)      MEDICATIONS  (STANDING):  ascorbic acid 500 milliGRAM(s) Oral daily  aspirin enteric coated 81 milliGRAM(s) Oral daily  atorvastatin 40 milliGRAM(s) Oral at bedtime  cholecalciferol 1000 Unit(s) Oral daily  clopidogrel Tablet 75 milliGRAM(s) Oral daily  dextrose 40% Gel 15 Gram(s) Oral once  dextrose 5%. 1000 milliLiter(s) (50 mL/Hr) IV Continuous <Continuous>  dextrose 5%. 1000 milliLiter(s) (100 mL/Hr) IV Continuous <Continuous>  dextrose 50% Injectable 25 Gram(s) IV Push once  dextrose 50% Injectable 12.5 Gram(s) IV Push once  dextrose 50% Injectable 25 Gram(s) IV Push once  enoxaparin Injectable 40 milliGRAM(s) SubCutaneous daily  glucagon  Injectable 1 milliGRAM(s) IntraMuscular once  insulin lispro (ADMELOG) corrective regimen sliding scale   SubCutaneous three times a day before meals  insulin lispro (ADMELOG) corrective regimen sliding scale   SubCutaneous at bedtime  lisinopril 5 milliGRAM(s) Oral daily  pantoprazole    Tablet 40 milliGRAM(s) Oral before breakfast  piperacillin/tazobactam IVPB.. 3.375 Gram(s) IV Intermittent every 8 hours  rOPINIRole 0.25 milliGRAM(s) Oral two times a day  thiamine 100 milliGRAM(s) Oral daily  vancomycin  IVPB 1000 milliGRAM(s) IV Intermittent every 12 hours    MEDICATIONS  (PRN):  methocarbamol 500 milliGRAM(s) Oral every 6 hours PRN Muscle Spasm  oxyCODONE    IR 5 milliGRAM(s) Oral three times a day PRN Severe Pain (7 - 10)      Allergies    No Known Allergies    Intolerances        REVIEW OF SYSTEMS: Negative except as per HPI.    VITAL SIGNS:   Vital Signs Last 24 Hrs  T(C): 37.2 (03 Dec 2020 06:15), Max: 37.8 (02 Dec 2020 18:43)  T(F): 99 (03 Dec 2020 06:15), Max: 100 (02 Dec 2020 18:43)  HR: 100 (03 Dec 2020 07:52) (90 - 109)  BP: 62/42 (03 Dec 2020 07:52) (62/42 - 131/72)  BP(mean): --  RR: 20 (03 Dec 2020 07:52) (18 - 22)  SpO2: 98% (03 Dec 2020 07:52) (97% - 100%)    I&O's Summary      PHYSICAL EXAM:  Constitutional: Pt in distress  HEENT NC/AT, moist mucous membranes  Pulmonary: Diminished breath sounds throughout  Cardiovascular: +S1, S2, RRR, no murmur  Gastrointestinal: Soft, nontender, nondistended, normoactive bowel sounds  Extremities: No peripheral edema   Neurological: Alert, answers some questions with 1 or 2 words - poor historian  Skin: No obvious lesions/rashes    LABS: All Labs Reviewed:                        11.4   14.20 )-----------( 305      ( 03 Dec 2020 06:22 )             38.4                         12.5   13.21 )-----------( 314      ( 02 Dec 2020 18:46 )             41.0     03 Dec 2020 06:22    136    |  95     |  57     ----------------------------<  324    5.2     |  32     |  0.85   02 Dec 2020 18:46    135    |  97     |  55     ----------------------------<  265    5.3     |  32     |  0.78     Ca    8.5        03 Dec 2020 06:22  Ca    8.4        02 Dec 2020 18:46  Mg     2.1       02 Dec 2020 18:46    TPro  7.2    /  Alb  3.6    /  TBili  0.6    /  DBili  x      /  AST  22     /  ALT  50     /  AlkPhos  92     02 Dec 2020 18:46    PT/INR - ( 02 Dec 2020 18:46 )   PT: 12.1 sec;   INR: 1.04 ratio         PTT - ( 02 Dec 2020 18:46 )  PTT:24.8 sec  CARDIAC MARKERS ( 03 Dec 2020 01:15 )  .059 ng/mL / x     / 14 U/L / x     / 1.8 ng/mL  CARDIAC MARKERS ( 02 Dec 2020 18:46 )  .068 ng/mL / x     / x     / x     / 1.9 ng/mL      Blood Culture:   12-02 @ 18:46  Pro Bnp [id number]    12-02 @ 18:46  TSH: 2.60      EKG:    RADIOLOGY:    CXR:   Impression: Bibasilar atelectasis/airspace disease. Correlate for infection. Small right pleural effusion.    CTA: Bilateral pulmonary emboli with CT findings suggesting right heart strain. The right lower lobe is nonaerated likely representative of a combination of atelectasis and pneumonia. Small-to-moderate right pleural effusion. [facility]         Stacey Du, Luis Fernando, Bronwyn, Evangelist, Deep, Sivakumar        [phone] (office)    Reason for Consult: abnormal EKG, SOB    Interval HPI: Patient seen and examined at bedside. Pt with rapid response in AM for hypoxia and hypotension. BP at time was 62/42, improvement of BP after 300cc fluids. Pt transitioned from Nasal canula to NRB. Pt is poor historian but admits to CP and shortness of breath. As per rapid note: bedside ultrasound during rapid showed dilated RV, hyperdynamic LV function. Pt for CTA found to have bilateral PE.     - EKG with S1Q3T3    HPI:  76 yo m pmh spinal stenosis, osteomyelitis of lumbar vertebrae, type 2 DM not on insulin, HTN, CAD, TIA, HLD, LISBETH, Iron deficiency anemia, thiamine deficiency, GERD presents [facility]. Pt is poor historian, states he does not know why he is here. denies any pain, respiratory distress. All ROS neg. Per chart review from [facility] nurse noted that patient was not acting like himself, he appeared to be in respiratory distress. Vitals were obtained and pt was afebrile, bp WNL, but he was hypoxic in the 80's and had wheezing on exam. Nurse placed pt on O2 and gave a nebulizer treatment. Physician evaluated pt and noted pt was still in respiratory distress, c/o shortness of breath and asking to open the windows for air. Pt was on 100% NRB. He denied chest pain, abd pain, nausea or vomiting. Exam at that time revealed dec breath sounds, fine crepts @ bases, trace lower extremity edema, cold to touch +pulses. Pt repeatedly asking please help. Facility then transferred pt to ED.     In the ED pt vital signs were wnl. He was no longer complaining of shortness of breath. he was sating high 90's - 100% on RA.  Labs obtained revealed WBC 13.21, H&H 12.5/41.0, Co2 32, Bun 55, glucose 265, troponin .068, probnp 19573, UA grossly WNL. RVP neg. Pt was given vancomycin and zosyn x 1. 1L NS. Tylenol 650mg x 1.    (02 Dec 2020 21:02)      PAST MEDICAL & SURGICAL HISTORY:  Bedbound    H/O osteomyelitis    GERD (gastroesophageal reflux disease)    HLD (hyperlipidemia)    HTN (hypertension)    TIA (transient ischemic attack)    Spinal stenosis        SOCIAL HISTORY: No active tobacco, alcohol or illicit drug use    FAMILY HISTORY:      Home Medications:  acetaminophen 325 mg oral tablet: 2 tab(s) orally every 8 hours, As Needed (02 Dec 2020 20:54)  ascorbic acid 500 mg oral tablet: 1 tab(s) orally once a day (02 Dec 2020 20:54)  aspirin 81 mg oral tablet: 1 tab(s) orally once a day (02 Dec 2020 20:54)  atorvastatin 40 mg oral tablet: 1 tab(s) orally once a day (02 Dec 2020 20:55)  clopidogrel 75 mg oral tablet: 1 tab(s) orally once a day (02 Dec 2020 20:55)  lisinopril 5 mg oral tablet: 1 tab(s) orally once a day (02 Dec 2020 20:54)  metFORMIN 500 mg oral tablet: 1 tab(s) orally 2 times a day (02 Dec 2020 20:54)  methocarbamol 500 mg oral tablet: 1 tab(s) orally every 6 hours, As Needed (02 Dec 2020 20:54)  omeprazole 40 mg oral delayed release capsule: 1 cap(s) orally once a day (02 Dec 2020 20:55)  oxyCODONE 5 mg oral tablet: 1 tab(s) orally 3 times a day (02 Dec 2020 20:55)  Requip 0.25 mg oral tablet: 1 tab(s) orally 2 times a day (02 Dec 2020 20:55)  thiamine 100 mg oral tablet: 1 tab(s) orally once a day (02 Dec 2020 20:55)  Vitamin B12 50 mcg oral tablet: 2 tab(s) orally once a day (02 Dec 2020 20:55)  Vitamin D3 1000 intl units (25 mcg) oral tablet: 1 tab(s) orally once a day (02 Dec 2020 20:55)      MEDICATIONS  (STANDING):  ascorbic acid 500 milliGRAM(s) Oral daily  aspirin enteric coated 81 milliGRAM(s) Oral daily  atorvastatin 40 milliGRAM(s) Oral at bedtime  cholecalciferol 1000 Unit(s) Oral daily  clopidogrel Tablet 75 milliGRAM(s) Oral daily  dextrose 40% Gel 15 Gram(s) Oral once  dextrose 5%. 1000 milliLiter(s) (50 mL/Hr) IV Continuous <Continuous>  dextrose 5%. 1000 milliLiter(s) (100 mL/Hr) IV Continuous <Continuous>  dextrose 50% Injectable 25 Gram(s) IV Push once  dextrose 50% Injectable 12.5 Gram(s) IV Push once  dextrose 50% Injectable 25 Gram(s) IV Push once  enoxaparin Injectable 40 milliGRAM(s) SubCutaneous daily  glucagon  Injectable 1 milliGRAM(s) IntraMuscular once  insulin lispro (ADMELOG) corrective regimen sliding scale   SubCutaneous three times a day before meals  insulin lispro (ADMELOG) corrective regimen sliding scale   SubCutaneous at bedtime  lisinopril 5 milliGRAM(s) Oral daily  pantoprazole    Tablet 40 milliGRAM(s) Oral before breakfast  piperacillin/tazobactam IVPB.. 3.375 Gram(s) IV Intermittent every 8 hours  rOPINIRole 0.25 milliGRAM(s) Oral two times a day  thiamine 100 milliGRAM(s) Oral daily  vancomycin  IVPB 1000 milliGRAM(s) IV Intermittent every 12 hours    MEDICATIONS  (PRN):  methocarbamol 500 milliGRAM(s) Oral every 6 hours PRN Muscle Spasm  oxyCODONE    IR 5 milliGRAM(s) Oral three times a day PRN Severe Pain (7 - 10)      Allergies    No Known Allergies    Intolerances        REVIEW OF SYSTEMS: Negative except as per HPI.    VITAL SIGNS:   Vital Signs Last 24 Hrs  T(C): 37.2 (03 Dec 2020 06:15), Max: 37.8 (02 Dec 2020 18:43)  T(F): 99 (03 Dec 2020 06:15), Max: 100 (02 Dec 2020 18:43)  HR: 100 (03 Dec 2020 07:52) (90 - 109)  BP: 62/42 (03 Dec 2020 07:52) (62/42 - 131/72)  BP(mean): --  RR: 20 (03 Dec 2020 07:52) (18 - 22)  SpO2: 98% (03 Dec 2020 07:52) (97% - 100%)    I&O's Summary      PHYSICAL EXAM:  Constitutional: Pt in distress  HEENT NC/AT, moist mucous membranes  Pulmonary: Diminished breath sounds throughout  Cardiovascular: +S1, S2, RRR, no murmur  Gastrointestinal: Soft, nontender, nondistended, normoactive bowel sounds  Extremities: No peripheral edema   Neurological: Alert, answers some questions with 1 or 2 words - poor historian  Skin: No obvious lesions/rashes    LABS: All Labs Reviewed:                        11.4   14.20 )-----------( 305      ( 03 Dec 2020 06:22 )             38.4                         12.5   13.21 )-----------( 314      ( 02 Dec 2020 18:46 )             41.0     03 Dec 2020 06:22    136    |  95     |  57     ----------------------------<  324    5.2     |  32     |  0.85   02 Dec 2020 18:46    135    |  97     |  55     ----------------------------<  265    5.3     |  32     |  0.78     Ca    8.5        03 Dec 2020 06:22  Ca    8.4        02 Dec 2020 18:46  Mg     2.1       02 Dec 2020 18:46    TPro  7.2    /  Alb  3.6    /  TBili  0.6    /  DBili  x      /  AST  22     /  ALT  50     /  AlkPhos  92     02 Dec 2020 18:46    PT/INR - ( 02 Dec 2020 18:46 )   PT: 12.1 sec;   INR: 1.04 ratio         PTT - ( 02 Dec 2020 18:46 )  PTT:24.8 sec  CARDIAC MARKERS ( 03 Dec 2020 01:15 )  .059 ng/mL / x     / 14 U/L / x     / 1.8 ng/mL  CARDIAC MARKERS ( 02 Dec 2020 18:46 )  .068 ng/mL / x     / x     / x     / 1.9 ng/mL      Blood Culture:   12-02 @ 18:46  Pro Bnp [id number]    12-02 @ 18:46  TSH: 2.60      EKG:    RADIOLOGY:    CXR:   Impression: Bibasilar atelectasis/airspace disease. Correlate for infection. Small right pleural effusion.    CTA: Bilateral pulmonary emboli with CT findings suggesting right heart strain. The right lower lobe is nonaerated likely representative of a combination of atelectasis and pneumonia. Small-to-moderate right pleural effusion.

## 2020-12-03 NOTE — DIETITIAN INITIAL EVALUATION ADULT. - FACTORS AFF FOOD INTAKE
difficulty swallowing/change in mental status/acute respiratory failure/other (specify)/NPO change in mental status/other (specify)/acute respiratory failure/NPO

## 2020-12-03 NOTE — DIETITIAN INITIAL EVALUATION ADULT. - ADD RECOMMEND
MVI with minerals daily, 500mg Vit C BID. Will remain available for po diet (per SLP recommendation) vs enteral nutrition as medically feasible. MVI with minerals daily, 500mg Vit C BID. Will remain available for po diet vs enteral nutrition as medically feasible. If po diet feasible recommend consistent carbohydrate w/ hs snack plus recommendations per SLP on diet texture/liquid consistency. Glucerna 8oz TID.

## 2020-12-03 NOTE — CHART NOTE - NSCHARTNOTEFT_GEN_A_CORE
Patient seen and examined, see ICU consult note for full detail     76 y/o male with hx HTN, HLD, DM2, CAD, LISBETH, chronic pain syndrome with intrathecal and abdominal pumps (inactive)  RRT this morning for hypotension (SBP 60s) and hypoxia  Was relatively fine through the night   Bedside exam with minimal responsiveness   Cold and cyanotic fingers, cold feet - poor perfusion   Bedside ECHO with severe RV and RA dilatation with systolic septal flattening, hyperdynamic LV and hypokinetic RV - Massive PE with obstructive shock was suspected   CTA chest with b/l PE   Patient was brought to the ICU where he remained hypotensive - Levo was started   ABG with hypercapnic resp failure - BiPAP initiated   Patient had poor iv access and a R arm PIV was placed under US guidance   Alteplase 100 mg was infused over 2 hours  IV heparin was started at the conclusion of alteplase   Bedside ECHO was performed 2 more times subsequently and on the 3rd exam RV was found to be just slightly smaller than earlier  Patient still stayed on Levo with poor peripheral perfusion   During the day R arm PIV was found to be infiltrated and nitropaste was applied and phentolamine was injected locally, arm was swollen but there were no other signs of ischemia/necrosis   I drew blood for lab work and found his blood to be clotting, even with an accidental arterial stick   In view of lack of clinical improvement, clotting blood on blood draw and infiltrated PIV (through which alteplase was infused) there is a high likelihood that he did not receive full 100 mg alteplase, he must have received some given improvement in RV size and Levo dose - will give another 50 mg alteplase   Gave Lovenox 70 mg earlier, will start q12 from am if no evidence of bleeding   For resp failure he was changed to AVAPS with improvement in Ve and mental status   CT also showed RLL pneumonia, will continue vancomycin and Zosyn for sepsis due to pneumonia and f/u Cx  Morphine pumps are nonfunctional at this time per family, UDS is neg for opioids   Prognosis is guarded to poor, patient is DNR, DNI per family   Patient critically ill, 100 mins spent with frequent bedside assessments   CCPA updated family on events

## 2020-12-03 NOTE — RAPID RESPONSE TEAM SUMMARY - NSSITUATIONBACKGROUNDRRT_GEN_ALL_CORE
74 yo m pmh spinal stenosis, osteomyelitis of lumbar vertebrae, type 2 DM not on insulin, HTN, CAD, TIA, HLD, LISBETH, Iron deficiency anemia, thiamine deficiency, GERD presents from [facility] admit for suspected pneumonia. Rapid response called for hypoxia and hypotension.

## 2020-12-03 NOTE — DIETITIAN INITIAL EVALUATION ADULT. - OTHER INFO
Pt lethargic at time of visit on Bipap. S/p rapid response today for change in MS, hypoxia and hypotension. Levophed rx. NPO. SLP unable to evaluate pt due to current respiratory status. GI wdl, fecal incontinence noted. Noted stage II pressure ulcer to sacrum, S.DTI to right great toe and right second toe. Will remain available for po diet vs enteral nutrition as medically appropriate. Pt lethargic at time of visit on Bipap. S/p rapid response today for change in MS, hypoxia and hypotension. Levophed rx. NPO. SLP unable to evaluate pt 12/3 due to current respiratory status. Pta from [facility]. On NELY, NCS diet (regular texture thin liquids) plus glucerna 8oz TID pta. GI wdl, fecal incontinence noted. Noted stage II pressure ulcer to sacrum, S.DTI to right great toe and right second toe. Will remain available for po diet vs enteral nutrition as medically appropriate.

## 2020-12-03 NOTE — DIETITIAN INITIAL EVALUATION ADULT. - SIGNS/SYMPTOMS
as evidenced by acute hypoxic respiratory failure, NPO on Bipap. as evidenced by stage II pressure ulcer to sacrum, right great toe & second toe DTI

## 2020-12-03 NOTE — CONSULT NOTE ADULT - PROBLEM SELECTOR RECOMMENDATION 9
74 yo m pmh spinal stenosis, osteomyelitis of lumbar vertebrae, type 2 DM not on insulin, HTN, CAD, TIA, HLD, LISBETH, Iron deficiency anemia, thiamine deficiency, GERD presents from [facility] admit for suspected pneumonia   eval for PNA - will check CT chest - eval PE - PNA - CHF - Pulm Edema -   will benefit from Cardio eval - elev ProBNP and CE - assess for Pulm EDEMA - TTE consideration  curr on Zosyn and VANCO - will attempt to de - escalate ABX regimen - MRSA screen and strep and legionella ag screening -   monitor VS and HD and Sat  curr on RA - VS noted - verbal - but poor historian -   pt is from Nursing Home - which is a risk factor for Healthcare Associated Infection

## 2020-12-03 NOTE — CONSULT NOTE ADULT - ASSESSMENT
Pt is a 76 y/o male with PMHx as above here with:    Assessment:  1. Acute hypoxic respiratory failure- PE high on differential, r/o pneumonia/infectious process, covid neg  2. Shock- concern for obstructive given POCUS findings  3. Encephalopathy    Plan:  - Given acuity of decompensation, afebrile, a-line predominant lung fields on POCUS but with massive, dilated RV with septal bowing into LV and elevated cardiac enzymes in setting of hypotension and hypoxia, concern for massive PE is high.   - Echo from 2019 w/ normal RV/unremarkable, which makes POCUS findings more concerning, as well as acute decompensation  - Placed on 100% NRB with saturations in mid to high 90s, actively titrating to maintain Spo2 >90  - Stat CTA to assess for PE  - +/- emergent tPA pending further history review (pt poor historian) to assess for absolute contraindications  - BP after  ~300ccs IVF in 90s/low 100s systolic  - Hold further fluids to avoid acute RV failure and worsening shock  - PRN pressors, maintain MAP 65-70, low threshold to start  - Hold anti-HTN  - Stat TTE  - Trend cardiac enzymes, stat EKG  - renal indices unremarkable, monitor I/Os, trend Cr, especially post shock state, concern for developing ATN   - Started on empiric antibiotics by medicine team, will continue for now, f/u cultures, afebrile, procal neg, trend WBC/temp curve  - NPO for now, monitor f/s, on sliding scale, speech/swallow eval ordered  - On thiamine, hold deliriogenic medications/sedating medications, non focal and equal withdraw to pain all extremities, given history of tia, cva always of concern, doubtful at this time    Dispo: Full code. Critically ill.     Critical Care time: 58 mins assessing presenting problems of acute illness that poses high probability of life threatening deterioration or end organ damage/dysfunction.  Medical decision making including initiating plan of care, reviewing data, reviewing radiology, direct patient bedside evaluation and interpretation of vital signs, any necessary ventilator management, discussion with multidisciplinary team, discussing goals of care with patient/family, all non inclusive of procedures    Pt is a 76 y/o male with PMHx as above here with:    Assessment:  1. Acute hypoxic respiratory failure- PE high on differential, r/o pneumonia/infectious process given CXR, covid neg  2. Shock- concern for obstructive given POCUS findings  3. Encephalopathy    Plan:  - Given acuity of decompensation, afebrile, a-line predominant lung fields on POCUS but with massive, dilated RV with septal bowing into LV and elevated cardiac enzymes in setting of hypotension and hypoxia, concern for massive PE is high.   - Echo from 2019 w/ normal RV/unremarkable, which makes POCUS findings more concerning, as well as acute decompensation  - Placed on 100% NRB with saturations in mid to high 90s, actively titrating to maintain Spo2 >90  - Stat CTA to assess for PE  - +/- emergent tPA pending further history review (pt poor historian) to assess for absolute contraindications  - BP after  ~300ccs IVF in 90s/low 100s systolic  - Hold further fluids to avoid acute RV failure and worsening shock  - PRN pressors, maintain MAP 65-70, low threshold to start  - Hold anti-HTN  - Stat TTE  - Trend cardiac enzymes, stat EKG  - renal indices unremarkable, monitor I/Os, trend Cr, especially post shock state, concern for developing ATN   - Started on empiric antibiotics by medicine team, will continue for now given possible infiltrative process on CXR, f/u cultures, afebrile but with leukocytosis, procal neg, trend WBC/temp curve  - NPO for now, monitor f/s, on sliding scale, speech/swallow eval ordered  - On thiamine, hold deliriogenic medications/sedating medications, non focal and equal withdraw to pain all extremities, given history of tia, cva always of concern, doubtful at this time    Dispo: Full code. Critically ill.     Critical Care time: 58 mins assessing presenting problems of acute illness that poses high probability of life threatening deterioration or end organ damage/dysfunction.  Medical decision making including initiating plan of care, reviewing data, reviewing radiology, direct patient bedside evaluation and interpretation of vital signs, any necessary ventilator management, discussion with multidisciplinary team, discussing goals of care with patient/family, all non inclusive of procedures    Pt is a 74 y/o male with PMHx as above here with:    Assessment:  1. Acute hypoxic respiratory failure- PE high on differential, concern for acute RV failure as well, r/o pneumonia/infectious process given CXR, covid neg  2. Shock- concern for obstructive given POCUS findings vs acute RV failure  3. Encephalopathy  4. R/o sepsis    Plan:  - Placed on 100% NRB with saturations in mid to high 90s, actively titrating to maintain Spo2 >90  - Given acuity of decompensation, afebrile, a-line predominant lung fields on POCUS but with massive, dilated RV with septal bowing into LV and elevated cardiac enzymes in setting of hypotension and hypoxia, concern for massive PE is high vs acute RV failure.   - Echo from 2019 w/ normal RV/unremarkable, which makes POCUS findings more concerning, as well as acuity of decompensation  - Stat CTA to assess for PE  - +/- emergent tPA pending further history review (pt poor historian) to assess for absolute contraindications  - If acute RV failure, will minimize fluids to avoid exacerbating acute RV failure, low threshold for pressors, +/- diuretics  - Stat formal TTE  - R/o RV infarct, trend cardiac enzymes, stat set now, stat EKG  - Hold anti-HTN  - renal indices unremarkable, monitor I/Os, trend Cr, especially post shock state, concern for developing ATN   - Started on empiric antibiotics by medicine team, will continue for now given possible infiltrative process on CXR and questionable right base density/consolidation on POCUS, f/u cultures, afebrile but with leukocytosis, procal neg, trend WBC/temp curve  - NPO for now, monitor f/s, on sliding scale, speech/swallow eval ordered  - On thiamine, hold deliriogenic medications/sedating medications, non focal and equal withdraw to pain all extremities, given history of tia, cva always of concern, doubtful at this time    Dispo: Full code. Critically ill.     Critical Care time: 58 mins assessing presenting problems of acute illness that poses high probability of life threatening deterioration or end organ damage/dysfunction.  Medical decision making including initiating plan of care, reviewing data, reviewing radiology, direct patient bedside evaluation and interpretation of vital signs, any necessary ventilator management, discussion with multidisciplinary team, discussing goals of care with patient/family, all non inclusive of procedures    Pt is a 74 y/o male with PMHx as above here with:    Assessment:  1. Acute hypoxic respiratory failure- PE high on differential, concern for acute RV failure as well, r/o pneumonia/infectious process given CXR, covid neg  2. Shock- concern for obstructive given POCUS findings vs acute RV failure, r/o septic  3. Encephalopathy  4. R/o sepsis    Plan:  - Placed on 100% NRB with saturations in mid to high 90s, actively titrating to maintain Spo2 >90  - Given acuity of decompensation, afebrile, a-line predominant lung fields on POCUS but with massive, dilated RV with septal bowing into LV and elevated cardiac enzymes in setting of hypotension and hypoxia, concern for massive PE is high vs acute RV failure.   - Echo from 2019 w/ normal RV/unremarkable, which makes POCUS findings more concerning, as well as acuity of decompensation  - Stat CTA to assess for PE  - +/- emergent tPA pending further history review (pt poor historian) to assess for absolute contraindications  - If acute RV failure, will minimize fluids to avoid exacerbating acute RV failure, low threshold for pressors, +/- diuretics  - Stat formal TTE  - R/o RV infarct, trend cardiac enzymes, stat set now, stat EKG  - Hold anti-HTN  - renal indices unremarkable, monitor I/Os, trend Cr, especially post shock state, concern for developing ATN   - Started on empiric antibiotics by medicine team, will continue for now given possible infiltrative process on CXR and questionable right base density/consolidation on POCUS, f/u cultures, afebrile but with leukocytosis, procal neg, trend WBC/temp curve  - NPO for now, monitor f/s, on sliding scale, speech/swallow eval ordered  - On thiamine, hold deliriogenic medications/sedating medications, non focal and equal withdraw to pain all extremities, given history of tia, cva always of concern, doubtful at this time    Dispo: Full code. Critically ill.     Critical Care time: 58 mins assessing presenting problems of acute illness that poses high probability of life threatening deterioration or end organ damage/dysfunction.  Medical decision making including initiating plan of care, reviewing data, reviewing radiology, direct patient bedside evaluation and interpretation of vital signs, any necessary ventilator management, discussion with multidisciplinary team, discussing goals of care with patient/family, all non inclusive of procedures

## 2020-12-04 NOTE — PROGRESS NOTE ADULT - SUBJECTIVE AND OBJECTIVE BOX
Patient is a 75y old  Male who presents with a chief complaint of Resp Distress (04 Dec 2020 08:18)    24 hour events: ***    REVIEW OF SYSTEMS  Constitutional: No fever, chills, fatigue  Neuro: No headache, numbness, weakness  Resp: No cough, wheezing, shortness of breath  CVS: No chest pain, palpitations, leg swelling  GI: No abdominal pain, nausea, vomiting, diarrhea   : No dysuria, frequency, incontinence  Skin: No itching, burning, rashes, or lesions   Msk: No joint pain or swelling  Psych: No depression, anxiety, mood swings  Heme: No bleeding    T(F): 99.3 (20 @ 07:13), Max: 99.3 (20 @ 07:13)  HR: 83 (20 @ 08:22) (69 - 110)  BP: 100/56 (20 @ 07:30) (58/29 - 161/80)  RR: 27 (20 07:30) (10 - 36)  SpO2: 100% (20 @ 08:22) (90% - 100%)  Wt(kg): --            I&O's Summary     @ :  -   @ 07:00  --------------------------------------------------------  IN: 775.8 mL / OUT: 900 mL / NET: -124.2 mL     @ 07:01  -   @ 08:29  --------------------------------------------------------  IN: 600 mL / OUT: 350 mL / NET: 250 mL      PHYSICAL EXAM  General:   CNS:   HEENT:   Resp:   CVS:   Abd:   Ext:   Skin:     MEDICATIONS  piperacillin/tazobactam IVPB.. IV Intermittent  vancomycin  IVPB IV Intermittent    norepinephrine Infusion IV Continuous    dextrose 40% Gel Oral  dextrose 50% Injectable IV Push  dextrose 50% Injectable IV Push  dextrose 50% Injectable IV Push  glucagon  Injectable IntraMuscular  insulin lispro (ADMELOG) corrective regimen sliding scale SubCutaneous          enoxaparin Injectable SubCutaneous        dextrose 5%. IV Continuous  dextrose 5%. IV Continuous  thiamine Injectable IV Push      chlorhexidine 2% Cloths Topical                            11.9   23.97 )-----------( 200      ( 03 Dec 2020 18:36 )             38.0       12-03    140  |  98  |  63<H>  ----------------------------<  95  4.6   |  38<H>  |  0.73    Ca    8.3<L>      03 Dec 2020 18:38  Phos  7.7       Mg     2.1         TPro  7.2  /  Alb  3.6  /  TBili  0.6  /  DBili  x   /  AST  22  /  ALT  50  /  AlkPhos  92        CARDIAC MARKERS ( 03 Dec 2020 18:38 )  .497 ng/mL / x     / 31 U/L / x     / 3.1 ng/mL  CARDIAC MARKERS ( 03 Dec 2020 09:56 )  .060 ng/mL / x     / 20 U/L / x     / 2.0 ng/mL  CARDIAC MARKERS ( 03 Dec 2020 01:15 )  .059 ng/mL / x     / 14 U/L / x     / 1.8 ng/mL  CARDIAC MARKERS ( 02 Dec 2020 18:46 )  .068 ng/mL / x     / x     / x     / 1.9 ng/mL      PT/INR - ( 02 Dec 2020 18:46 )   PT: 12.1 sec;   INR: 1.04 ratio         PTT - ( 03 Dec 2020 18:36 )  PTT:25.4 sec  Urinalysis Basic - ( 02 Dec 2020 18:46 )    Color: Yellow / Appearance: Clear / S.015 / pH: x  Gluc: x / Ketone: Negative  / Bili: Negative / Urobili: Negative   Blood: x / Protein: 15 / Nitrite: Negative   Leuk Esterase: Small / RBC: x / WBC 0-2   Sq Epi: x / Non Sq Epi: Occasional / Bacteria: x      .Urine Catheterized   >=3 organisms. Probable collection contamination. --  @ 01:28  .Blood Blood-Peripheral   No growth to date. --  @ 01:12      Rapid RVP Result: NotDetec ( @ 18:46)    Radiology: ***  Bedside lung ultrasound: ***  Bedside ECHO: ***    CENTRAL LINE: Y/N          DATE INSERTED:              REMOVE: Y/N  HARDY: Y/N                        DATE INSERTED:              REMOVE: Y/N  A-LINE: Y/N                       DATE INSERTED:              REMOVE: Y/N    GLOBAL ISSUE/BEST PRACTICE  Analgesia:   Sedation:   CAM-ICU:   HOB elevation: yes  Stress ulcer prophylaxis:   VTE prophylaxis:   Glycemic control:   Nutrition:     CODE STATUS: ***  [facility] discussion: Y       Patient is a 75y old  Male who presents with a chief complaint of Resp Distress (04 Dec 2020 08:18)    24 hour events: Pt doing well this morning, off BiPAP and on nasal cannula. Home medications were restarted. Pt has no recollection of why he is admitted to the ICU, but states that he is feeling well with no complaints.     REVIEW OF SYSTEMS  Constitutional: No fever, chills, fatigue  Neuro: No headache, numbness, weakness  Resp: No cough, wheezing, shortness of breath  CVS: No chest pain, palpitations, leg swelling  GI: No abdominal pain, nausea, vomiting, diarrhea     T(F): 99.3 (20 @ 07:13), Max: 99.3 (20 @ 07:13)  HR: 83 (20 08:22) (69 - 110)  BP: 100/56 (20 07:30) (58/29 - 161/80)  RR: 27 (20 07:30) (10 - 36)  SpO2: 100% (20 08:22) (90% - 100%)  Wt(kg): --        I&O's Summary     @ 07:  -   @ 07:00  --------------------------------------------------------  IN: 775.8 mL / OUT: 900 mL / NET: -124.2 mL     @ 07:01  -   @ 08:29  --------------------------------------------------------  IN: 600 mL / OUT: 350 mL / NET: 250 mL      PHYSICAL EXAM  Constitutional: Pt in NAD,  HEENT NC/AT, moist mucous membranes  Pulmonary: CTA b/l  Cardiovascular: +S1, S2, RRR, no murmur  Gastrointestinal: Soft, nontender, nondistended, normoactive bowel sounds  Extremities: No peripheral edema   Neurological: Alert, answers some questions with 1 or 2 words - poor historian  Skin: No obvious lesions/rashes    MEDICATIONS  piperacillin/tazobactam IVPB.. IV Intermittent  vancomycin  IVPB IV Intermittent    norepinephrine Infusion IV Continuous    dextrose 40% Gel Oral  dextrose 50% Injectable IV Push  dextrose 50% Injectable IV Push  dextrose 50% Injectable IV Push  glucagon  Injectable IntraMuscular  insulin lispro (ADMELOG) corrective regimen sliding scale SubCutaneous          enoxaparin Injectable SubCutaneous        dextrose 5%. IV Continuous  dextrose 5%. IV Continuous  thiamine Injectable IV Push      chlorhexidine 2% Cloths Topical                            11.9   23.97 )-----------( 200      ( 03 Dec 2020 18:36 )             38.0       12-03    140  |  98  |  63<H>  ----------------------------<  95  4.6   |  38<H>  |  0.73    Ca    8.3<L>      03 Dec 2020 18:38  Phos  7.7     12-03  Mg     2.1     12-03    TPro  7.2  /  Alb  3.6  /  TBili  0.6  /  DBili  x   /  AST  22  /  ALT  50  /  AlkPhos  92  12-02      CARDIAC MARKERS ( 03 Dec 2020 18:38 )  .497 ng/mL / x     / 31 U/L / x     / 3.1 ng/mL  CARDIAC MARKERS ( 03 Dec 2020 09:56 )  .060 ng/mL / x     / 20 U/L / x     / 2.0 ng/mL  CARDIAC MARKERS ( 03 Dec 2020 01:15 )  .059 ng/mL / x     / 14 U/L / x     / 1.8 ng/mL  CARDIAC MARKERS ( 02 Dec 2020 18:46 )  .068 ng/mL / x     / x     / x     / 1.9 ng/mL      PT/INR - ( 02 Dec 2020 18:46 )   PT: 12.1 sec;   INR: 1.04 ratio         PTT - ( 03 Dec 2020 18:36 )  PTT:25.4 sec  Urinalysis Basic - ( 02 Dec 2020 18:46 )    Color: Yellow / Appearance: Clear / S.015 / pH: x  Gluc: x / Ketone: Negative  / Bili: Negative / Urobili: Negative   Blood: x / Protein: 15 / Nitrite: Negative   Leuk Esterase: Small / RBC: x / WBC 0-2   Sq Epi: x / Non Sq Epi: Occasional / Bacteria: x      .Urine Catheterized   >=3 organisms. Probable collection contamination. --  @ 01:28  .Blood Blood-Peripheral   No growth to date. --  @ 01:12      Rapid RVP Result: NotDetec ( @ 18:46)    Radiology: ***  Bedside lung ultrasound: ***  Bedside ECHO: ***    CENTRAL LINE: Y/N          DATE INSERTED:              REMOVE: Y/N  HARDY: Y/N                        DATE INSERTED:              REMOVE: Y/N  A-LINE: Y/N                       DATE INSERTED:              REMOVE: Y/N    GLOBAL ISSUE/BEST PRACTICE  Analgesia:   Sedation:   CAM-ICU:   HOB elevation: yes  Stress ulcer prophylaxis:   VTE prophylaxis:   Glycemic control:   Nutrition:     CODE STATUS: ***  [facility] discussion: Y       Patient is a 75y old  Male who presents with a chief complaint of Resp Distress (04 Dec 2020 08:18)    24 hour events: Pt doing well this morning, off BiPAP and on nasal cannula. Home medications were restarted. Pt has no recollection of why he is admitted to the ICU, but states that he is feeling well with no complaints.     REVIEW OF SYSTEMS  Constitutional: No fever, chills, fatigue  Neuro: No headache, numbness, weakness  Resp: No cough, wheezing, shortness of breath  CVS: No chest pain, palpitations, leg swelling  GI: No abdominal pain, nausea, vomiting, diarrhea     T(F): 99.3 (20 @ 07:13), Max: 99.3 (20 @ 07:13)  HR: 83 (20 08:22) (69 - 110)  BP: 100/56 (20 07:30) (58/29 - 161/80)  RR: 27 (20 07:30) (10 - 36)  SpO2: 100% (20 08:22) (90% - 100%)  Wt(kg): --        I&O's Summary     @ 07:01  -   @ 07:00  --------------------------------------------------------  IN: 775.8 mL / OUT: 900 mL / NET: -124.2 mL     @ 07:01  -   @ 08:29  --------------------------------------------------------  IN: 600 mL / OUT: 350 mL / NET: 250 mL      PHYSICAL EXAM  Constitutional: Pt in NAD,  HEENT NC/AT, moist mucous membranes  Pulmonary: CTA b/l  Cardiovascular: +S1, S2, RRR, no murmur  Gastrointestinal: Soft, nontender, nondistended, normoactive bowel sounds  Extremities: No peripheral edema   Neurological: Alert and oriented, answering questions appropriately.   Skin: No obvious lesions/rashes    MEDICATIONS  piperacillin/tazobactam IVPB. IV Intermittent  vancomycin IVPB IV Intermittent    norepinephrine Infusion IV Continuous    dextrose 40% Gel Oral  dextrose 50% Injectable IV Push  dextrose 50% Injectable IV Push  dextrose 50% Injectable IV Push  glucagon  Injectable IntraMuscular  insulin lispro (ADMELOG) corrective regimen sliding scale SubCutaneous          enoxaparin Injectable SubCutaneous        dextrose 5%. IV Continuous  dextrose 5%. IV Continuous  thiamine Injectable IV Push      chlorhexidine 2% Cloths Topical                            11.9   23.97 )-----------( 200      ( 03 Dec 2020 18:36 )             38.0       12-03    140  |  98  |  63<H>  ----------------------------<  95  4.6   |  38<H>  |  0.73    Ca    8.3<L>      03 Dec 2020 18:38  Phos  7.7     12-03  Mg     2.1     12-03    TPro  7.2  /  Alb  3.6  /  TBili  0.6  /  DBili  x   /  AST  22  /  ALT  50  /  AlkPhos  92  12-02      CARDIAC MARKERS ( 03 Dec 2020 18:38 )  .497 ng/mL / x     / 31 U/L / x     / 3.1 ng/mL  CARDIAC MARKERS ( 03 Dec 2020 09:56 )  .060 ng/mL / x     / 20 U/L / x     / 2.0 ng/mL  CARDIAC MARKERS ( 03 Dec 2020 01:15 )  .059 ng/mL / x     / 14 U/L / x     / 1.8 ng/mL  CARDIAC MARKERS ( 02 Dec 2020 18:46 )  .068 ng/mL / x     / x     / x     / 1.9 ng/mL      PT/INR - ( 02 Dec 2020 18:46 )   PT: 12.1 sec;   INR: 1.04 ratio         PTT - ( 03 Dec 2020 18:36 )  PTT:25.4 sec  Urinalysis Basic - ( 02 Dec 2020 18:46 )    Color: Yellow / Appearance: Clear / S.015 / pH: x  Gluc: x / Ketone: Negative  / Bili: Negative / Urobili: Negative   Blood: x / Protein: 15 / Nitrite: Negative   Leuk Esterase: Small / RBC: x / WBC 0-2   Sq Epi: x / Non Sq Epi: Occasional / Bacteria: x      .Urine Catheterized   >=3 organisms. Probable collection contamination. --  @ 01:28  .Blood Blood-Peripheral   No growth to date. --  @ 01:12      Rapid RVP Result: NotDetec ( @ 18:46)    Radiology: ***  Bedside lung ultrasound: ***  Bedside ECHO: ***      HARDY: Y                         A-LINE: N     GLOBAL ISSUE/BEST PRACTICE  Analgesia: N  Sedation: N  CAM-ICU:   HOB elevation: yes  Stress ulcer prophylaxis:   VTE prophylaxis: Y  Glycemic control: Y  Nutrition: Diabetic diet    CODE STATUS: DNR  [facility] discussion: Y

## 2020-12-04 NOTE — ADVANCED PRACTICE NURSE CONSULT - ASSESSMENT
Patient is a 76yo male admitted with PNA: He has a PMHX of HTN, HLD, OM, TIA, spinal stenosis, GERD, ventral hernia, pancreatitis, HLD, spinal stenosis: He presents with:   1: Deep tissue pressure injury (DTPI) at the dorsal  Hallux 2 x 2 no drainage  2. DTPI dorsal lower extremity 2nd digit 2 x 2   pedal pulse not palpable, no hair growth, cap refill >3 secs, cool extremities  3. Moisture associated skin damage (MASD) at intergluteal cleft  4. Chronic blanchable skin injury bilateral buttocks, sacral region  5. Fungal moisture damage noted in the bilateral axilla today

## 2020-12-04 NOTE — PROGRESS NOTE ADULT - ASSESSMENT
74 yo m pmh spinal stenosis, osteomyelitis of lumbar vertebrae, type 2 DM not on insulin, HTN, CAD, TIA, HLD, LISBETH, Iron deficiency anemia, thiamine deficienicy, GERD presents [facility] for suspected pneumonia.    Pt initially presented with SOB and possible PNA. Initial CXR with bibasilar atelectasis/airspace disease and small right pleural effusion. Pt with worsening SOB rapid response called for hypoxia and hypotension. Pt also found to be tachycardiac. POCUS with bedside ultrasound during rapid response showed dilated RV, hyperdynamic LV function. Pt placed on NRB, admitting to chest pain and SOB. Pt admitted to ICU. Current EKG with S1Q3T3 consistent with PE. CTA shows Bilateral pulmonary emboli with CT findings suggesting right heart strain and small-to-moderate right pleural effusion. tPA given.  Bedside echo with signs of RV failure  Pressors now off    Recommendations   - Pressors weaned to off  - Troponin trend not consistent with ACS.  Rather, it is consistent with RV failure in the setting of massive PE with hemodynamic collapse  - F/u official echo  - Hold plavix 75 qd  - Hold aspirin 81mg qd  - s/p TPA  - FUll dose Lovenox started  - Can resume statin when able  - Monitor hemodynamics closely. Patient at risk for abrupt decompensation  - Respiratory support with BIPAP as needed  - Monitor and replete lytes, keep K>4, Mg>2.  - Other cardiovascular workup will depend on clinical course.  - All other workup per ICU team.  - Will continue to follow.

## 2020-12-04 NOTE — PROGRESS NOTE ADULT - ASSESSMENT
HPI    Plan:   Neuro  -    CV  -     RESP  -    GI  -    RENAL  -    ID  -     HEME   -    ENDO  -    DISPO       Pt is a 76 y/o male with PMHx as above here with respiratory distress and RHF 2/2 to pulmonary embolism.    Assessment:  1. Acute hypoxic respiratory failure- PE high on differential, concern for acute RV failure as well, r/o pneumonia/infectious process given CXR, covid neg  2. Shock- concern for obstructive given POCUS findings vs acute RV failure, r/o septic  3. Encephalopathy  4. R/o sepsis    Plan:   Neuro  -Pt awake and alert, likely mentating at baseline.   -Restart home medications Ropinirole and Thiamine. Mental status likely 2/2 to hypoxia, now resolved.     CV  -Right heart strain/ heart failure 2/2 to PE, now resolving. Cardiac enzymes improving.   -Echo from 2019 w/ normal RV/unremarkable  -Continue home meds atorvastatin and aspirin.  -Holding clopidogrel and lisinopril.  -On bedside cardiac US possible thrombus on tricuspid valve. Limited bedside echo ordered, will FU results.     RESP  -S/p respiratory failure 2/2 to   -ABG showing hypoxic resp acidosis with renal compensation.   -Pt off of BiPAP, oxygenating well on nasal cannula.     GI  -Dysphagia 3 diet, protonix.     RENAL  -Monitor renal indices and replete lytes prn.  -Monitor fluids, avoid volume overload.     ID  -BCx growing coag negative staph in one bottle, likely contamination.   -Continue antibiotics Zosyn, and vanco.   -Staph aureus pos, MRSA neg.     HEME   -Aspirin and atorvastatin Full dose lovenox for PE.   -S/p tPA and heparin drip.     ENDO  -Sliding scale insulin. Monitor BG.     DISPO: Status improved, Stable for transfer to med/surg w/ remote tele and continuous pulse ox. Pt is a 74 y/o male with PMHx as above here with respiratory distress and RHF 2/2 to pulmonary embolism.    Assessment:  1. Acute hypoxic respiratory failure- PE high on differential, concern for acute RV failure as well, r/o pneumonia/infectious process given CXR, covid neg  2. Shock- concern for obstructive given POCUS findings vs acute RV failure, r/o septic  3. Encephalopathy  4. R/o sepsis    Plan:   Neuro  -Pt awake and alert, likely mentating at baseline.   -Restart home medications Ropinirole and Thiamine. Mental status likely 2/2 to hypoxia, now resolved.     CV  -Right heart strain/ heart failure 2/2 to PE, now resolving. Cardiac enzymes improving.   -Echo from 2019 w/ normal RV/unremarkable  -Continue home meds atorvastatin and aspirin.  -Holding clopidogrel and lisinopril.  -On bedside cardiac US possible thrombus on tricuspid valve. No mass appreciated on formal TTE.     RESP  -S/p respiratory failure 2/2 to   -ABG showing hypoxic resp acidosis with renal compensation.   -Pt off of BiPAP, oxygenating well on nasal cannula.     GI  -Dysphagia 3 diet, protonix.     RENAL  -Monitor renal indices and replete lytes prn.  -Monitor fluids, avoid volume overload.     ID  -BCx growing coag negative staph in one bottle, likely contamination.   -Continue antibiotics Zosyn, and vanco.   -Staph aureus pos, MRSA neg, Legionella neg.     HEME   -Aspirin and atorvastatin Full dose lovenox for PE.   -S/p tPA and heparin drip.     ENDO  -Sliding scale insulin. Monitor BG.     DISPO: Status improved, Stable for transfer to med/surg w/ remote tele and continuous pulse ox. Pt is a 76 y/o male with PMHx as above here with respiratory distress and RHF 2/2 to pulmonary embolism.    Assessment:  1. Acute hypoxic respiratory failure- PE high on differential, concern for acute RV failure as well, r/o pneumonia/infectious process given CXR, covid neg  2. Shock- concern for obstructive given POCUS findings vs acute RV failure, r/o septic  3. Encephalopathy  4. R/o sepsis    Plan:   Neuro  -Pt awake and alert, likely mentating at baseline.   -Restart home medications Ropinirole and Thiamine. Mental status likely 2/2 to hypoxia, now resolved.     CV  -Right heart strain/ heart failure 2/2 to PE, now resolving. Cardiac enzymes improving.   -Echo from 2019 w/ normal RV/unremarkable  -Continue home meds atorvastatin and aspirin.  -Holding clopidogrel and lisinopril.  -On bedside cardiac US possible thrombus on tricuspid valve. No mass appreciated on formal TTE.     RESP  -S/p respiratory failure 2/2 to   -ABG showing hypoxic resp acidosis with renal compensation.   -Pt off of BiPAP, oxygenating well on nasal cannula.     GI  -Dysphagia 3 diet, protonix.     RENAL  -Monitor renal indices and replete lytes prn.  -Monitor fluids, avoid volume overload.     ID  -BCx growing coag negative staph in one bottle, likely contamination.   -Continue antibiotics Zosyn, and vanco.   -Staph aureus pos, MRSA neg, Legionella neg.     HEME   -Aspirin and atorvastatin Full dose lovenox for PE.   -S/p tPA and heparin drip.     ENDO  -Sliding scale insulin. Monitor BG.     DISPO: Status improved, Stable for transfer to med/surg w/ remote tele and continuous pulse ox.     Discussed plan of care with daughter Yanelis Roberto at [phone] 12/4.

## 2020-12-04 NOTE — PROGRESS NOTE ADULT - SUBJECTIVE AND OBJECTIVE BOX
Patient is a 75y old  Male who presents with a chief complaint of Resp Distress (04 Dec 2020 07:39)    INTERVAL HPI/OVERNIGHT EVENTS: Patient seen and examined at bedside in ICU. Pt alert and talking, states he is hungry, wants double portion meal. Discussed device in lower abdomen, states its a pain pump, was getting it refilled every 3 months but it has not been refilled recently so not functioning now per patient.     MEDICATIONS  (STANDING):  chlorhexidine 2% Cloths 1 Application(s) Topical <User Schedule>  dextrose 40% Gel 15 Gram(s) Oral once  dextrose 5%. 1000 milliLiter(s) (50 mL/Hr) IV Continuous <Continuous>  dextrose 5%. 1000 milliLiter(s) (100 mL/Hr) IV Continuous <Continuous>  dextrose 50% Injectable 25 Gram(s) IV Push once  dextrose 50% Injectable 12.5 Gram(s) IV Push once  dextrose 50% Injectable 25 Gram(s) IV Push once  enoxaparin Injectable 70 milliGRAM(s) SubCutaneous every 12 hours  glucagon  Injectable 1 milliGRAM(s) IntraMuscular once  insulin lispro (ADMELOG) corrective regimen sliding scale   SubCutaneous every 6 hours  norepinephrine Infusion 0.05 MICROgram(s)/kG/Min (6.46 mL/Hr) IV Continuous <Continuous>  piperacillin/tazobactam IVPB.. 3.375 Gram(s) IV Intermittent every 8 hours  thiamine Injectable 100 milliGRAM(s) IV Push daily  vancomycin  IVPB 1000 milliGRAM(s) IV Intermittent every 12 hours    MEDICATIONS  (PRN):    Allergies - No Known Allergies    Intolerances    REVIEW OF SYSTEMS:  CONSTITUTIONAL: No fever or chills  HEENT: No headache, no sore throat  RESPIRATORY: No cough, wheezing  CARDIOVASCULAR: No chest pain, palpitations  GASTROINTESTINAL: No abd pain, nausea, vomiting, or diarrhea  GENITOURINARY: No dysuria, frequency, or hematuria  NEUROLOGICAL: No focal weakness or dizziness  MUSCULOSKELETAL: no myalgias     Vital Signs Last 24 Hrs  T(C): 37.4 (04 Dec 2020 07:13), Max: 37.4 (04 Dec 2020 07:13)  T(F): 99.3 (04 Dec 2020 07:13), Max: 99.3 (04 Dec 2020 07:13)  HR: 79 (04 Dec 2020 07:30) (69 - 110)  BP: 100/56 (04 Dec 2020 07:30) (58/29 - 161/80)  BP(mean): 69 (04 Dec 2020 07:30) (38 - 115)  RR: 27 (04 Dec 2020 07:30) (10 - 36)  SpO2: 100% (04 Dec 2020 07:30) (90% - 100%)    PHYSICAL EXAM:  GENERAL: NAD  HEENT: MMM, anicteric, PEERL  CHEST/LUNG: diminished BS right, no wheezing  HEART:  RRR  ABDOMEN:  BS+, soft, nontender, nondistended, palpable device in LLQ  EXTREMITIES: + UE and LE edema, no cyanosis  NERVOUS SYSTEM: answers questions and follows commands appropriately    LABS:                        11.9   23.97 )-----------( 200      ( 03 Dec 2020 18:36 )             38.0     CBC Full  -  ( 03 Dec 2020 18:36 )  WBC Count : 23.97 K/uL  Hemoglobin : 11.9 g/dL  Hematocrit : 38.0 %  Platelet Count - Automated : 200 K/uL  Mean Cell Volume : 102.7 fl  Mean Cell Hemoglobin : 32.2 pg  Mean Cell Hemoglobin Concentration : 31.3 gm/dL  Auto Neutrophil # : x  Auto Lymphocyte # : x  Auto Monocyte # : x  Auto Eosinophil # : x  Auto Basophil # : x  Auto Neutrophil % : x  Auto Lymphocyte % : x  Auto Monocyte % : x  Auto Eosinophil % : x  Auto Basophil % : x    03 Dec 2020 18:38    140    |  98     |  63     ----------------------------<  95     4.6     |  38     |  0.73     Ca    8.3        03 Dec 2020 18:38  Phos  7.7       03 Dec 2020 18:38  Mg     2.1       03 Dec 2020 18:38      PT/INR - ( 02 Dec 2020 18:46 )   PT: 12.1 sec;   INR: 1.04 ratio         PTT - ( 03 Dec 2020 18:36 )  PTT:25.4 sec  Urinalysis Basic - ( 02 Dec 2020 18:46 )    Color: Yellow / Appearance: Clear / S.015 / pH: x  Gluc: x / Ketone: Negative  / Bili: Negative / Urobili: Negative   Blood: x / Protein: 15 / Nitrite: Negative   Leuk Esterase: Small / RBC: x / WBC 0-2   Sq Epi: x / Non Sq Epi: Occasional / Bacteria: x      CAPILLARY BLOOD GLUCOSE      POCT Blood Glucose.: 87 mg/dL (04 Dec 2020 05:59)  POCT Blood Glucose.: 83 mg/dL (04 Dec 2020 01:23)  POCT Blood Glucose.: 76 mg/dL (03 Dec 2020 23:52)  POCT Blood Glucose.: 169 mg/dL (03 Dec 2020 18:43)  POCT Blood Glucose.: 313 mg/dL (03 Dec 2020 11:49)        Culture - Urine (collected 20 @ 01:28)  Source: .Urine Catheterized  Final Report (20 @ 02:36):    >=3 organisms. Probable collection contamination.    Culture - Blood (collected 20 @ 01:12)  Source: .Blood Blood-Peripheral  Preliminary Report (20 @ 02:01):    No growth to date.    Culture - Blood (collected 20 @ 01:12)  Source: .Blood Blood-Peripheral  Preliminary Report (20 @ 02:01):    No growth to date.        RADIOLOGY & ADDITIONAL TESTS:    Personally reviewed.     Consultant(s) Notes Reviewed:  [x] YES  [ ] NO

## 2020-12-04 NOTE — PROGRESS NOTE ADULT - SUBJECTIVE AND OBJECTIVE BOX
Date/Time Patient Seen:  		  Referring MD:   Data Reviewed	       Patient is a 75y old  Male who presents with a chief complaint of Resp Distress (03 Dec 2020 14:38)      Subjective/HPI     PAST MEDICAL & SURGICAL HISTORY:  Bedbound    H/O osteomyelitis    GERD (gastroesophageal reflux disease)    HLD (hyperlipidemia)    HTN (hypertension)    TIA (transient ischemic attack)    Spinal stenosis    Obesity (BMI 30-39.9)    History of pancreatitis  ~ 1990, 1992    History of ventral hernia    Diabetic peripheral neuropathy    Radial styloid tenosynovitis    Carpal tunnel syndrome on both sides    Spinal stenosis of lumbar region    Anemia    Hyperlipidemia    Hypertension    Type 2 diabetes mellitus    Sleep apnea    History of right knee surgery  &quot; Removal of right knee tumor&quot; ,  1959    History of appendectomy  17 years ago          Medication list         MEDICATIONS  (STANDING):  chlorhexidine 2% Cloths 1 Application(s) Topical <User Schedule>  dextrose 40% Gel 15 Gram(s) Oral once  dextrose 5%. 1000 milliLiter(s) (50 mL/Hr) IV Continuous <Continuous>  dextrose 5%. 1000 milliLiter(s) (100 mL/Hr) IV Continuous <Continuous>  dextrose 50% Injectable 25 Gram(s) IV Push once  dextrose 50% Injectable 12.5 Gram(s) IV Push once  dextrose 50% Injectable 25 Gram(s) IV Push once  glucagon  Injectable 1 milliGRAM(s) IntraMuscular once  insulin lispro (ADMELOG) corrective regimen sliding scale   SubCutaneous every 6 hours  norepinephrine Infusion 0.05 MICROgram(s)/kG/Min (6.46 mL/Hr) IV Continuous <Continuous>  piperacillin/tazobactam IVPB.. 3.375 Gram(s) IV Intermittent every 8 hours  thiamine Injectable 100 milliGRAM(s) IV Push daily  vancomycin  IVPB 1000 milliGRAM(s) IV Intermittent every 12 hours    MEDICATIONS  (PRN):         Vitals log        ICU Vital Signs Last 24 Hrs  T(C): 36.9 (04 Dec 2020 05:30), Max: 37 (03 Dec 2020 09:23)  T(F): 98.5 (04 Dec 2020 05:30), Max: 98.6 (03 Dec 2020 09:23)  HR: 74 (04 Dec 2020 06:30) (69 - 110)  BP: 147/77 (04 Dec 2020 06:30) (58/29 - 161/80)  BP(mean): 105 (04 Dec 2020 06:30) (38 - 115)  ABP: --  ABP(mean): --  RR: 17 (04 Dec 2020 06:30) (10 - 36)  SpO2: 100% (04 Dec 2020 06:30) (90% - 100%)           Input and Output:  I&O's Detail    03 Dec 2020 07:01  -  04 Dec 2020 06:47  --------------------------------------------------------  IN:    Heparin Infusion: 96 mL    IV PiggyBack: 150 mL    IV PiggyBack: 250 mL    IV PiggyBack: 100 mL    Norepinephrine: 179.8 mL  Total IN: 775.8 mL    OUT:    Indwelling Catheter - Urethral (mL): 900 mL  Total OUT: 900 mL    Total NET: -124.2 mL          Lab Data                        11.9   23.97 )-----------( 200      ( 03 Dec 2020 18:36 )             38.0     12-03    140  |  98  |  63<H>  ----------------------------<  95  4.6   |  38<H>  |  0.73    Ca    8.3<L>      03 Dec 2020 18:38  Phos  7.7     12-03  Mg     2.1     12-03    TPro  7.2  /  Alb  3.6  /  TBili  0.6  /  DBili  x   /  AST  22  /  ALT  50  /  AlkPhos  92  12-02    ABG - ( 04 Dec 2020 04:58 )  pH, Arterial: 7.45  pH, Blood: x     /  pCO2: 48    /  pO2: 66    / HCO3: 32    / Base Excess: 8.7   /  SaO2: 94                CARDIAC MARKERS ( 03 Dec 2020 18:38 )  .497 ng/mL / x     / 31 U/L / x     / 3.1 ng/mL  CARDIAC MARKERS ( 03 Dec 2020 09:56 )  .060 ng/mL / x     / 20 U/L / x     / 2.0 ng/mL  CARDIAC MARKERS ( 03 Dec 2020 01:15 )  .059 ng/mL / x     / 14 U/L / x     / 1.8 ng/mL  CARDIAC MARKERS ( 02 Dec 2020 18:46 )  .068 ng/mL / x     / x     / x     / 1.9 ng/mL        Review of Systems	      Objective     Physical Examination    heart s1s2  lung dec BS  abd soft  head nc  head at      Pertinent Lab findings & Imaging      Daron:  NO   Adequate UO     I&O's Detail    03 Dec 2020 07:01  -  04 Dec 2020 06:47  --------------------------------------------------------  IN:    Heparin Infusion: 96 mL    IV PiggyBack: 150 mL    IV PiggyBack: 250 mL    IV PiggyBack: 100 mL    Norepinephrine: 179.8 mL  Total IN: 775.8 mL    OUT:    Indwelling Catheter - Urethral (mL): 900 mL  Total OUT: 900 mL    Total NET: -124.2 mL               Discussed with:     Cultures:	        Radiology

## 2020-12-04 NOTE — PHARMACOTHERAPY INTERVENTION NOTE - COMMENTS
Patient Name: Oral Manning Date: 1945  Address: [address] [place], [place] [address]Wfa: Male  Rx Written	Rx Dispensed	Drug	Quantity	Days Supply	Prescriber Name	Payment Method	Dispenser  11/17/2020	11/17/2020	morphine sulfate powder *	1gm	30	Eva Soria, EILEEN	Medicare	[facility]  10/16/2020	10/16/2020	morphine sulfate powder *	1gm	30	Eva Soria, EILEEN	Medicare	[facility]  08/14/2020	08/14/2020	morphine sulfate powder *	1gm	30	Eva Soria, EILEEN	Medicare	[facility]  06/16/2020	06/16/2020	morphine sulfate powder *	1gm	30	Eva Soria, EILEEN	Medicare	[facility]  05/29/2020	06/15/2020	modafinil 200 mg tablet	30	30	Farahmandpour, Behrouz DO	Insurance	[facility] [id number]  03/31/2020	03/31/2020	morphine sulfate powder *	1gm	30	Antoinette Bolaños ANP	Medicare	[facility]  03/04/2020	03/30/2020	modafinil 200 mg tablet	30	30	Antoinette Bolaños ANP	Insurance	[facility] [id number]  02/12/2020	02/17/2020	modafinil 200 mg tablet	30	30	Miguel Tyson MD	Insurance	[facility] [id number]  12/26/2019	12/26/2019	morphine sulfate powder *	1gm	30	Antoinette Bolaños ANP	Medicare	[facility]    Patient Name: Oral Manning Date: 1945  Address[address] [facility] [address] [facility] [address] [place], [place] [address]Wve: Male  Rx Written	Rx Dispensed	Drug	Quantity	Days Supply	Prescriber Name	Payment Method	Dispenser  07/15/2020	07/23/2020	diazepam 2 mg tablet	2	1	Thong Whipple (PA)	Insurance	[facility] [id number]    Patient Name: Oral Manning Date: 1945  Address: [place], [place] [address]Hag: Male  Rx Written	Rx Dispensed	Drug	Quantity	Days Supply	Prescriber Name	Payment Method	Dispenser  02/10/2020	02/10/2020	modafinil 200 mg tablet	5	5	Miguel Tyson MD	[facility]  01/09/2020	01/09/2020	modafinil 200 mg tablet	30	30	Miguel Tyson MD	Other	[facility]    Patient Name: Oral Manning Date: 1945  Address: [address], [place] [address]Tvy: Male  Rx Written	Rx Dispensed	Drug	Quantity	Days Supply	Prescriber Name	Payment Method	Dispenser  08/24/2020	08/24/2020	modafinil 200 mg tablet	30	30	Papito Lira ()	[facility] [id number]    Patient Name: Oral Manning Date: 1945  Address: [address], [place] [address]Oom: Male  Rx Written	Rx Dispensed	Drug	Quantity	Days Supply	Prescriber Name	Payment Method	Dispenser  09/28/2020	09/28/2020	modafinil 100 mg tablet	30	30	Allan Dyer MD	[place]  09/18/2020	09/18/2020	modafinil 200 mg tablet	30	30	Allan Dyer MD	[place]	[facility]

## 2020-12-04 NOTE — PROGRESS NOTE ADULT - ASSESSMENT
74 yo male with pmhx spinal stenosis, osteomyelitis of lumbar vertebrae, type 2 DM not on insulin, HTN, CAD, TIA, HLD, LISBETH, Iron deficiency anemia, thiamine deficienicy , GERD presents [facility] admitted for suspected pneumonia now with acute hypoxic hypercapneic resp failure due to bilateral PE, pneumonia and CO2 retention 2/2 LISBETH    Acute respiratory failure with hypoxia and hypercapnia   - Sent to ICU from the ED 12/3  - Due to bilateral PE, ?pneumonia and CO2 retention 2/2 LISBETH  - On NC this AM, was on BIPAP overnight  - See below for details/care per ICU  - cards and pulm consulted    Shock, likely cardiogenic  - possible cardiogenic  or  septic shock given PNA  - Now off vasopressors  - bed side echo shows RV strain  - f/u blood cultures    Metabolic encephalopathy likely due to hypercapnia  - now resolved  - Blood gas reviewed, CO2 improved compared to previous day  - Bipap qhs/prn.  - care per ICU  - TSH wnl    Bilateral pulmonary embolism with RV strain  - s/p tPA 12/03, continue heparin drip  - f/u echo and LE dopplers    Pneumonia, suspect health care associated pneumonia  - Cont Vanc and Zosyn (f/u Vanc trough)  - f/u legionella, MRSA testing  - Covid negative    R/O ACS (acute coronary syndrome).   - pt presenting with sob, elevated troponin and abnormal EKG- no prior for comparison, denies chest pain  - consult cardio  - c/w ASA, statin, plavix  - monitor on remote tele.     HTN (hypertension)  - currently hypotensive  - holding antihypertensives    History of TIA (transient ischemic attack).   - c/w statin, plavix.      Diabetes mellitus.   - hyperglycemic on presentation.   - hold metformin   - start insulin sliding scale, hypoglycemia protocol  - f/u a1c 6%     Spinal stenosis.  - pt with chronic pain, states hes been bedbound x several weeks  - has pain pump he says is no longer in use   - Urine drug screen negative    HLD (hyperlipidemia).   - c/w statin.     GERD (gastroesophageal reflux disease).    - continue protonix, interchange for omeprazole.     Prophylactic measure.   dvt ppx: heparin ggt

## 2020-12-04 NOTE — PHYSICAL THERAPY INITIAL EVALUATION ADULT - PERTINENT HX OF CURRENT PROBLEM, REHAB EVAL
74 yo M presents from [facility] admitted for suspected pneumonia now with acute hypoxic hypercapneic resp failure due to bilateral PE, pneumonia and CO2 retention 2/2 LISBETH. CTA shows Bilateral pulmonary emboli, CT findings suggesting right heart strain and small-to-moderate right pleural effusion. tPA given.

## 2020-12-04 NOTE — PROVIDER CONTACT NOTE (MEDICATION) - RECOMMENDATIONS
case discussed with ISABELLA Ashley  decrease to low corrective q 6  add IVF dextrose 5% LR @ 75/hr  Goal 140-180 mg/dL in critical care setting

## 2020-12-04 NOTE — ADVANCED PRACTICE NURSE CONSULT - RECOMMEDATIONS
1. Continue with current plan of care  2. Nystatin powder to bilateral axilla daily using crusting technique  RN educated in the care of this patients wound care  Discussed case with PA

## 2020-12-04 NOTE — SWALLOW BEDSIDE ASSESSMENT ADULT - SWALLOW EVAL: DIAGNOSIS
1. The patient demonstrated functional oral management of puree, nectar thick, and thin liquid textures marked by adequate bolus collection, transfer, and posterior transport. 2. The patient demonstrated a mild oral dysphagia for solids marked by prolonged oral manipulation resulting in delayed bolus collection, transfer, and posterior transport. Mild lingual residue noted subsequent to deglutition which cleared with utilization of a liquid wash. 3. The patient demonstrated a functional pharyngeal phase of the swallow for puree, solid, nectar thick, and thin liquid textures marked by a suspected timely pharyngeal swallow trigger with hyolaryngeal elevation upon digital palpation w/o evidence of airway penetration. 1. The patient demonstrated functional oral management of puree, nectar thick, and thin liquid textures marked by adequate bolus collection, transfer, and posterior transport. 2. The patient demonstrated a mild oral dysphagia for solids marked by prolonged oral manipulation resulting in delayed bolus collection, transfer, and posterior transport. Mild lingual residue noted subsequent to deglutition which cleared with utilization of a liquid wash. 3. The patient demonstrated a functional pharyngeal phase of the swallow for puree, solid, nectar thick, and thin liquid textures marked by a suspected timely pharyngeal swallow trigger with evidence of hyolaryngeal elevation upon digital palpation w/o evidence of airway penetration.

## 2020-12-04 NOTE — PROGRESS NOTE ADULT - SUBJECTIVE AND OBJECTIVE BOX
[facility] - Stacey Du, Luis Fernando, Bronwyn, Evangelist, Sivakumar Adame  Office Number:  [phone]    Patient resting comfortably in bed in NAD.  Respiratory distress noted overnight.  Patient started on BIPAP.  Unable to provide any meaningful interval history.    F/U for:  b/l PE.  RV failure    Telemetry:  NSR.  No events.    MEDICATIONS  (STANDING):  chlorhexidine 2% Cloths 1 Application(s) Topical <User Schedule>  dextrose 40% Gel 15 Gram(s) Oral once  dextrose 5%. 1000 milliLiter(s) (50 mL/Hr) IV Continuous <Continuous>  dextrose 5%. 1000 milliLiter(s) (100 mL/Hr) IV Continuous <Continuous>  dextrose 50% Injectable 25 Gram(s) IV Push once  dextrose 50% Injectable 12.5 Gram(s) IV Push once  dextrose 50% Injectable 25 Gram(s) IV Push once  enoxaparin Injectable 70 milliGRAM(s) SubCutaneous every 12 hours  glucagon  Injectable 1 milliGRAM(s) IntraMuscular once  insulin lispro (ADMELOG) corrective regimen sliding scale   SubCutaneous every 6 hours  norepinephrine Infusion 0.05 MICROgram(s)/kG/Min (6.46 mL/Hr) IV Continuous <Continuous>  piperacillin/tazobactam IVPB.. 3.375 Gram(s) IV Intermittent every 8 hours  thiamine Injectable 100 milliGRAM(s) IV Push daily  vancomycin  IVPB 1000 milliGRAM(s) IV Intermittent every 12 hours    MEDICATIONS  (PRN):      Allergies    No Known Allergies    Intolerances        Vital Signs Last 24 Hrs  T(C): 36.9 (04 Dec 2020 05:30), Max: 37 (03 Dec 2020 09:23)  T(F): 98.5 (04 Dec 2020 05:30), Max: 98.6 (03 Dec 2020 09:23)  HR: 74 (04 Dec 2020 06:30) (69 - 110)  BP: 147/77 (04 Dec 2020 06:30) (58/29 - 161/80)  BP(mean): 105 (04 Dec 2020 06:30) (38 - 115)  RR: 17 (04 Dec 2020 06:30) (10 - 36)  SpO2: 100% (04 Dec 2020 06:30) (90% - 100%)    I&O's Summary    03 Dec 2020 07:01  -  04 Dec 2020 07:00  --------------------------------------------------------  IN: 775.8 mL / OUT: 900 mL / NET: -124.2 mL        ON EXAM:    General: Mild respiratory distress.  On BIPAP for respiratory support.  HEENT: Mucous membranes are dry, anicteric  Lungs: Non-labored, breath sounds are clear bilaterally, No wheezing, rales or rhonchi.  Coarse bs bl  Cardiovascular: Regular, S1 and S2, no murmurs, rubs, or gallops  Gastrointestinal: Bowel Sounds present, soft, nontender.   Lymph: Minimal peripheral edema. No lymphadenopathy.  Skin: No rashes or ulcers  Psych:  Unable to properly assess    LABS: All Labs Reviewed:                        11.9   23.97 )-----------( 200      ( 03 Dec 2020 18:36 )             38.0                         11.4   14.20 )-----------( 305      ( 03 Dec 2020 06:22 )             38.4                         12.5   13.21 )-----------( 314      ( 02 Dec 2020 18:46 )             41.0     03 Dec 2020 18:38    140    |  98     |  63     ----------------------------<  95     4.6     |  38     |  0.73   03 Dec 2020 06:22    136    |  95     |  57     ----------------------------<  324    5.2     |  32     |  0.85   02 Dec 2020 18:46    135    |  97     |  55     ----------------------------<  265    5.3     |  32     |  0.78     Ca    8.3        03 Dec 2020 18:38  Ca    8.5        03 Dec 2020 06:22  Ca    8.4        02 Dec 2020 18:46  Phos  7.7       03 Dec 2020 18:38  Mg     2.1       03 Dec 2020 18:38  Mg     2.1       02 Dec 2020 18:46    TPro  7.2    /  Alb  3.6    /  TBili  0.6    /  DBili  x      /  AST  22     /  ALT  50     /  AlkPhos  92     02 Dec 2020 18:46    PT/INR - ( 02 Dec 2020 18:46 )   PT: 12.1 sec;   INR: 1.04 ratio         PTT - ( 03 Dec 2020 18:36 )  PTT:25.4 sec  CARDIAC MARKERS ( 03 Dec 2020 18:38 )  .497 ng/mL / x     / 31 U/L / x     / 3.1 ng/mL  CARDIAC MARKERS ( 03 Dec 2020 09:56 )  .060 ng/mL / x     / 20 U/L / x     / 2.0 ng/mL  CARDIAC MARKERS ( 03 Dec 2020 01:15 )  .059 ng/mL / x     / 14 U/L / x     / 1.8 ng/mL  CARDIAC MARKERS ( 02 Dec 2020 18:46 )  .068 ng/mL / x     / x     / x     / 1.9 ng/mL      Blood Culture: Organism --  Gram Stain Blood -- Gram Stain --  Specimen Source .Urine Catheterized  Culture-Blood --    Organism --  Gram Stain Blood -- Gram Stain --  Specimen Source .Blood Blood-Peripheral  Culture-Blood --      12-02 @ 18:46  Pro Bnp [id number]    12-03 @ 09:56  TSH: 2.53  12-02 @ 18:46  TSH: 2.60

## 2020-12-04 NOTE — SWALLOW BEDSIDE ASSESSMENT ADULT - SWALLOW EVAL: FUNCTIONAL LEVEL AT TIME OF EVAL
Boo Valladares is a 65 year old male here for an annual diabetic eye exam.  His last eye exam was one year ago.  He has had diabetes for 13 years.  Blood sugar ranges around 140-150 and tests 1 time per day.  Jack's primary care doctor is Sari Cox MD.   There is a history of   1. No diabetic retinopathy in both eyes    2. Cortical cataract of left eye    3. Hyperopia with astigmatism and presbyopia, bilateral      He reports no visual concerns.  He declines having a refraction today.  Hemoglobin A1C (%)   Date Value   05/18/2017 6.9 (H)   12/08/2016 6.3 (H)   05/02/2016 6.6 (H)       ROS     Negative for: Constitutional, Gastrointestinal, Neurological, Skin, Genitourinary, Musculoskeletal, HENT, Endocrine, Cardiovascular, Eyes, Respiratory, Psychiatric, Allergic/Imm, Heme/Lymph    Last edited by Felecia Barlow on 10/24/2017 11:46 AM. (History)          I have reviewed the patient's medications and allergies, past medical, surgical, social and family history, updating these as appropriate.  See Histories section of the EMR for a display of this information.        ASSESSMENT:  1. No diabetic retinopathy in both eyes    2. Cortical cataract of left eye    3. Hyperopia with astigmatism and presbyopia, bilateral    (no refraction done).  No apparent changes in the peripheral cortical lens opacities in the left eye.    PLAN:  1.  Patient education regarding diabetic eye disease including lack of retinopathy.  Discussed importance of blood sugar as well as blood pressure and cholesterol control for overall health and to lower chances of diabetic retinopathy developing or progressing.  Also discussed the mild cataract present in the left eye & that it's having no effect on his vision but should also be monitored yearly.  2.  Return for next routine diabetic eye exam in 1 year or sooner as needed.      Luh Chavez, OD           Alert and cooperative

## 2020-12-04 NOTE — PROGRESS NOTE ADULT - PROBLEM SELECTOR PLAN 1
remains on NIPPV - AVAPS - on Pressors overnight -   76 y/o male with hx HTN, HLD, DM2, CAD, LISBETH, chronic pain syndrome with intrathecal and abdominal pumps (inactive)  s/p TLC - for acute PE with RV strain -   on emp ABX for sepsis - eval for LRTI - de - escalate as clinical data and micro data becomes more available  I and O  monitor HD and VS  keep sat > 88 pct  resume AC -

## 2020-12-05 NOTE — PROGRESS NOTE ADULT - ASSESSMENT
74 yo m pmh spinal stenosis, osteomyelitis of lumbar vertebrae, type 2 DM not on insulin, HTN, CAD, TIA, HLD, LISBETH, Iron deficiency anemia, thiamine deficienicy, GERD presents from [facility] admit for suspected pneumonia.    Pt initially presented with SOB and possible PNA. Initial CXR with bibasilar atelectasis/airspace disease and small right pleural effusion. Pt with worsening SOB rapid response called for hypoxia and hypotension. Pt also found to be tachycardiac. POCUS with bedside ultrasound during rapid response showed dilated RV, hyperdynamic LV function. Pt placed on NRB, admitting to chest pain and SOB. Pt admitted to ICU. Current EKG with S1Q3T3 consistent with PE. CTA shows Bilateral pulmonary emboli with CT findings suggesting right heart strain and small-to-moderate right pleural effusion. tPA given.  Bedside echo with signs of RV failure  Pressors now off    Recommendations   - Pressors weaned to off  - Troponin trend not consistent with ACS.  Rather, it is consistent with RV failure in the setting of massive PE with hemodynamic collapse  - Official echo reviewed.  RV dilation with decreased RV function. Limited echo with no mass associated with tricuspid valve  - Hold plavix 75 qd  - Baby aspirin resumed  - s/p TPA  - FUll dose Lovenox to be continued  - Can resume statin when able  - Monitor hemodynamics closely. Patient at risk for abrupt decompensation  - Respiratory support with BIPAP as needed  - Monitor and replete lytes, keep K>4, Mg>2.  - Other cardiovascular workup will depend on clinical course.  - All other workup per ICU team.  - Will continue to follow.

## 2020-12-05 NOTE — DISCHARGE NOTE PROVIDER - NSDCMRMEDTOKEN_GEN_ALL_CORE_FT
acetaminophen 325 mg oral tablet: 2 tab(s) orally every 8 hours, As Needed  ascorbic acid 500 mg oral tablet: 1 tab(s) orally once a day  aspirin 81 mg oral tablet: 1 tab(s) orally once a day  atorvastatin 40 mg oral tablet: 1 tab(s) orally once a day  clopidogrel 75 mg oral tablet: 1 tab(s) orally once a day  GaviLyte-H and Bisacodyl with Flavor Packs oral kit: 1 each orally once -for constipation   lisinopril 5 mg oral tablet: 1 tab(s) orally once a day  metFORMIN 500 mg oral tablet: 1 tab(s) orally 2 times a day  methocarbamol 500 mg oral tablet: 1 tab(s) orally every 6 hours, As Needed  omeprazole 40 mg oral delayed release capsule: 1 cap(s) orally once a day  oxyCODONE 5 mg oral tablet: 1 tab(s) orally 3 times a day  Requip 0.25 mg oral tablet: 1 tab(s) orally 2 times a day  thiamine 100 mg oral tablet: 1 tab(s) orally once a day  Vitamin B12 50 mcg oral tablet: 2 tab(s) orally once a day  Vitamin D3 1000 intl units (25 mcg) oral tablet: 1 tab(s) orally once a day   acetaminophen 325 mg oral tablet: 2 tab(s) orally every 8 hours, As Needed  ascorbic acid 500 mg oral tablet: 1 tab(s) orally once a day  atorvastatin 40 mg oral tablet: 1 tab(s) orally once a day  clopidogrel 75 mg oral tablet: 1 tab(s) orally once a day  Eliquis: 10 mg twice a day for 7 days, then 5mg twice a day after that  GaviLyte-H and Bisacodyl with Flavor Packs oral kit: 1 each orally once -for constipation   lisinopril 5 mg oral tablet: 1 tab(s) orally once a day  metFORMIN 500 mg oral tablet: 1 tab(s) orally 2 times a day  omeprazole 40 mg oral delayed release capsule: 1 cap(s) orally once a day  oxyCODONE 5 mg oral tablet: 1 tab(s) orally 3 times a day  Requip 0.25 mg oral tablet: 1 tab(s) orally 2 times a day  thiamine 100 mg oral tablet: 1 tab(s) orally once a day  Vitamin B12 50 mcg oral tablet: 2 tab(s) orally once a day  Vitamin D3 1000 intl units (25 mcg) oral tablet: 1 tab(s) orally once a day

## 2020-12-05 NOTE — PROGRESS NOTE ADULT - PROBLEM SELECTOR PLAN 1
76 y/o male with hx HTN, HLD, DM2, CAD, LISBETH, chronic pain syndrome with intrathecal and abdominal pumps (inactive)  s/p TLC - for acute PE with RV strain - on lovenox now for PE -   on emp ABX for sepsis - eval for LRTI -   I and O -   monitor HD and VS - tapered off pressors -   keep sat > 88 pct  cardio f/u noted - TTE reviewed -

## 2020-12-05 NOTE — PROGRESS NOTE ADULT - SUBJECTIVE AND OBJECTIVE BOX
Date/Time Patient Seen:  		  Referring MD:   Data Reviewed	       Patient is a 75y old  Male who presents with a chief complaint of Resp Distress (05 Dec 2020 07:50)      Subjective/HPI     PAST MEDICAL & SURGICAL HISTORY:  Bedbound    H/O osteomyelitis    GERD (gastroesophageal reflux disease)    HLD (hyperlipidemia)    HTN (hypertension)    TIA (transient ischemic attack)    Spinal stenosis    Obesity (BMI 30-39.9)    History of pancreatitis  ~ 1990, 1992    History of ventral hernia    Diabetic peripheral neuropathy    Radial styloid tenosynovitis    Carpal tunnel syndrome on both sides    Spinal stenosis of lumbar region    Anemia    Hyperlipidemia    Hypertension    Type 2 diabetes mellitus    Sleep apnea    History of right knee surgery  &quot; Removal of right knee tumor&quot; ,  1959    History of appendectomy  17 years ago          Medication list         MEDICATIONS  (STANDING):  ascorbic acid 500 milliGRAM(s) Oral daily  aspirin enteric coated 81 milliGRAM(s) Oral daily  chlorhexidine 2% Cloths 1 Application(s) Topical <User Schedule>  dextrose 40% Gel 15 Gram(s) Oral once  dextrose 5%. 1000 milliLiter(s) (50 mL/Hr) IV Continuous <Continuous>  dextrose 5%. 1000 milliLiter(s) (100 mL/Hr) IV Continuous <Continuous>  dextrose 50% Injectable 25 Gram(s) IV Push once  dextrose 50% Injectable 12.5 Gram(s) IV Push once  dextrose 50% Injectable 25 Gram(s) IV Push once  enoxaparin Injectable 70 milliGRAM(s) SubCutaneous every 12 hours  glucagon  Injectable 1 milliGRAM(s) IntraMuscular once  insulin lispro (ADMELOG) corrective regimen sliding scale   SubCutaneous Before meals and at bedtime  insulin lispro Injectable (ADMELOG) 3 Unit(s) SubCutaneous three times a day before meals  nystatin Powder 1 Application(s) Topical <User Schedule>  pantoprazole    Tablet 40 milliGRAM(s) Oral before breakfast  piperacillin/tazobactam IVPB.. 3.375 Gram(s) IV Intermittent every 8 hours  rOPINIRole 0.25 milliGRAM(s) Oral two times a day  thiamine Injectable 100 milliGRAM(s) IV Push daily    MEDICATIONS  (PRN):         Vitals log        ICU Vital Signs Last 24 Hrs  T(C): 36.9 (05 Dec 2020 05:27), Max: 37.7 (04 Dec 2020 16:30)  T(F): 98.4 (05 Dec 2020 05:27), Max: 99.9 (04 Dec 2020 16:30)  HR: 87 (05 Dec 2020 07:22) (73 - 103)  BP: 147/66 (05 Dec 2020 07:00) (115/56 - 162/120)  BP(mean): 96 (05 Dec 2020 07:00) (79 - 136)  ABP: --  ABP(mean): --  RR: 18 (05 Dec 2020 07:00) (15 - 32)  SpO2: 100% (05 Dec 2020 07:22) (100% - 100%)           Input and Output:  I&O's Detail    04 Dec 2020 07:01  -  05 Dec 2020 07:00  --------------------------------------------------------  IN:    IV PiggyBack: 300 mL    IV PiggyBack: 250 mL    Oral Fluid: 2050 mL  Total IN: 2600 mL    OUT:    dextrose 5% + lactated ringers: 0 mL    Indwelling Catheter - Urethral (mL): 2405 mL    Norepinephrine: 0 mL  Total OUT: 2405 mL    Total NET: 195 mL          Lab Data                        8.3    9.83  )-----------( 196      ( 05 Dec 2020 05:52 )             27.4     12-04    137  |  97  |  52<H>  ----------------------------<  237<H>  4.3   |  33<H>  |  0.72    Ca    7.4<L>      04 Dec 2020 09:44  Phos  3.7     12-04  Mg     1.9     12-04    TPro  5.2<L>  /  Alb  2.4<L>  /  TBili  0.8  /  DBili  x   /  AST  197<H>  /  ALT  435<H>  /  AlkPhos  75  12-04    ABG - ( 04 Dec 2020 04:58 )  pH, Arterial: 7.45  pH, Blood: x     /  pCO2: 48    /  pO2: 66    / HCO3: 32    / Base Excess: 8.7   /  SaO2: 94                CARDIAC MARKERS ( 04 Dec 2020 09:44 )  .293 ng/mL / x     / 47 U/L / x     / x      CARDIAC MARKERS ( 03 Dec 2020 18:38 )  .497 ng/mL / x     / 31 U/L / x     / 3.1 ng/mL  CARDIAC MARKERS ( 03 Dec 2020 09:56 )  .060 ng/mL / x     / 20 U/L / x     / 2.0 ng/mL        Review of Systems	      Objective     Physical Examination    heart s1s2  lung dc BS  abd soft  head nc  head at    Pertinent Lab findings & Imaging      Daron:  NO   Adequate UO     I&O's Detail    04 Dec 2020 07:01  -  05 Dec 2020 07:00  --------------------------------------------------------  IN:    IV PiggyBack: 300 mL    IV PiggyBack: 250 mL    Oral Fluid: 2050 mL  Total IN: 2600 mL    OUT:    dextrose 5% + lactated ringers: 0 mL    Indwelling Catheter - Urethral (mL): 2405 mL    Norepinephrine: 0 mL  Total OUT: 2405 mL    Total NET: 195 mL               Discussed with:     Cultures:	        Radiology

## 2020-12-05 NOTE — PROGRESS NOTE ADULT - ASSESSMENT
76 y/o male with PMHx as above here with respiratory distress and RHF 2/2 to pulmonary embolism.    Assessment:  1. Acute hypoxic respiratory failure- PE high on differential, concern for acute RV failure as well, r/o pneumonia/infectious process given CXR, covid neg  2. Shock- concern for obstructive given POCUS findings vs acute RV failure, r/o septic  3. Encephalopathy  4. R/o sepsis    Plan:   Neuro  -lethargic this AM 2/2 hypercarbia. Improved with bipap.   -Continue Ropinirole and Thiamine.     CV  -Right heart strain/ heart failure 2/2 to PE, now resolving. Cardiac enzymes improving.   -Echo from 2019 w/ normal RV/unremarkable  -Continue home meds atorvastatin and aspirin.  -Holding clopidogrel and lisinopril.  -On bedside cardiac US possible thrombus on tricuspid valve. No mass appreciated on formal TTE.     RESP  -continue biPAP for hypercarbia     GI  -pureed, protonix.   -speech and swallow Monday    RENAL  -Monitor renal indices and replete lytes prn.  -Monitor fluids, avoid volume overload.     ID  -BCx growing coag negative staph in one bottle, likely contamination.   -Continue antibiotics Zosyn, and vanco.   -Staph aureus pos, MRSA neg, Legionella neg.     HEME   -Aspirin and atorvastatin Full dose lovenox for PE.   -S/p tPA and heparin drip.     ENDO  -Sliding scale insulin. Monitor BG.     DISPO: Status improved, Stable for transfer to med/surg w/ remote tele and continuous pulse ox.

## 2020-12-05 NOTE — DISCHARGE NOTE PROVIDER - CARE PROVIDER_API CALL
Leno Wilson)  Internal Medicine  [id number] [address] [place] [address]  Phone: [phone]  Fax: [phone]  Follow Up Time:

## 2020-12-05 NOTE — PROGRESS NOTE ADULT - SUBJECTIVE AND OBJECTIVE BOX
Patient is a 75y old  Male who presents with a chief complaint of Resp Distress (05 Dec 2020 09:38)    24 hour events: Seen and examined at bedside. Patient lethargic but arousable. Change in mental status from yesterday. ABG with hypercarbia, will restart bipap.     REVIEW OF SYSTEMS  unable to obtain 2/2 clinical status    T(F): 98.2 (12-05-20 @ 11:28), Max: 99.9 (12-04-20 @ 16:30)  HR: 69 (12-05-20 @ 12:45) (69 - 100)  BP: 140/63 (12-05-20 @ 12:00) (115/56 - 162/120)  RR: 21 (12-05-20 @ 12:45) (9 - 32)  SpO2: 100% (12-05-20 @ 12:45) (100% - 100%)  Wt(kg): --            I&O's Summary    12-04 @ 07:01  -  12-05 @ 07:00  --------------------------------------------------------  IN: 2600 mL / OUT: 2405 mL / NET: 195 mL    12-05 @ 07:01  -  12-05 @ 14:41  --------------------------------------------------------  IN: 220 mL / OUT: 0 mL / NET: 220 mL      PHYSICAL EXAM  Constitutional: Pt in NAD, lethargic, easily awoken   HEENT NC/AT, moist mucous membranes  Pulmonary: CTA b/l  Cardiovascular: +S1, S2, RRR, no murmur  Gastrointestinal: Soft, nontender, nondistended, normoactive bowel sounds  Extremities: No peripheral edema   Neurological: AAOx2   Skin: No obvious lesions/rashes    MEDICATIONS  piperacillin/tazobactam IVPB.. IV Intermittent      dextrose 40% Gel Oral  dextrose 50% Injectable IV Push  dextrose 50% Injectable IV Push  dextrose 50% Injectable IV Push  glucagon  Injectable IntraMuscular  insulin lispro (ADMELOG) corrective regimen sliding scale SubCutaneous      rOPINIRole Oral      aspirin enteric coated Oral  enoxaparin Injectable SubCutaneous    pantoprazole    Tablet Oral      ascorbic acid Oral  dextrose 5%. IV Continuous  dextrose 5%. IV Continuous  thiamine Injectable IV Push      chlorhexidine 2% Cloths Topical  nystatin Powder Topical                            8.3    9.83  )-----------( 196      ( 05 Dec 2020 05:52 )             27.4       12-04    137  |  97  |  52<H>  ----------------------------<  237<H>  4.3   |  33<H>  |  0.72    Ca    7.4<L>      04 Dec 2020 09:44  Phos  3.7     12-04  Mg     1.9     12-04    TPro  5.2<L>  /  Alb  2.4<L>  /  TBili  0.8  /  DBili  x   /  AST  197<H>  /  ALT  435<H>  /  AlkPhos  75  12-04      CARDIAC MARKERS ( 04 Dec 2020 09:44 )  .293 ng/mL / x     / 47 U/L / x     / x      CARDIAC MARKERS ( 03 Dec 2020 18:38 )  .497 ng/mL / x     / 31 U/L / x     / 3.1 ng/mL      PTT - ( 03 Dec 2020 18:36 )  PTT:25.4 sec    .Urine Catheterized   >=3 organisms. Probable collection contamination. -- 12-03 @ 01:28  .Blood Blood-Peripheral   Growth in anaerobic bottle: Staphylococcus hominis  Coag Negative Staphylococcus  Single set isolate, possible contaminant. Contact  Microbiology if susceptibility testing clinically  indicated.  ***Blood Panel PCR results on this specimen are available  approximately 3 hours after the Gram stain result.***  Gram stain, PCR, and/or culture results may not always  correspond due to difference in methodologies.  ************************************************************  This PCR assay was performed using [employer].  The following targets are tested for: Enterococcus,  vancomycin resistant enterococci, Listeria monocytogenes,  coagulase negative staphylococci, S. aureus,  methicillin resistant S. aureus, Streptococcus agalactiae  (Group B), S. pneumoniae, S. pyogenes (Group A),  Acinetobacter baumannii, Enterobacter cloacae, E. coli,  Klebsiella oxytoca, K. pneumoniae, Proteus sp.,  Serratia marcescens, Haemophilus influenzae,  Neisseria meningitidis, Pseudomonas aeruginosa, Candida  albicans, C. glabrata, C krusei, C parapsilosis,  C. tropicalis and the KPC resistance gene.  "Due to technical problems, Proteus sp. will Not be reported as part of  the BCID panel until further notice"   Growth in anaerobic bottle: Gram Positive Cocci in Clusters 12-03 @ 01:12      Rapid RVP Result: NotDetec (12-02 @ 18:46)    Radiology: ***  Bedside lung ultrasound: ***  Bedside ECHO: ***      HARDY: Y       remove Y                  A-LINE: N     GLOBAL ISSUE/BEST PRACTICE  Analgesia: N  Sedation: N  CAM-ICU:   HOB elevation: yes  Stress ulcer prophylaxis:   VTE prophylaxis: Y  Glycemic control: Y  Nutrition: Diabetic diet    CODE STATUS: DNR  GOC discussion: Y

## 2020-12-05 NOTE — PROGRESS NOTE ADULT - SUBJECTIVE AND OBJECTIVE BOX
Patient is a 75y old  Male who presents with a chief complaint of Resp Distress (05 Dec 2020 07:50)      INTERVAL HPI/OVERNIGHT EVENTS: Patient seen and examined at bedside in ICU, nurse at bedside. Patient did not use bipap for all the night, per nurse, he had it on for some time then took it off.    MEDICATIONS  (STANDING):  ascorbic acid 500 milliGRAM(s) Oral daily  aspirin enteric coated 81 milliGRAM(s) Oral daily  chlorhexidine 2% Cloths 1 Application(s) Topical <User Schedule>  dextrose 40% Gel 15 Gram(s) Oral once  dextrose 5%. 1000 milliLiter(s) (50 mL/Hr) IV Continuous <Continuous>  dextrose 5%. 1000 milliLiter(s) (100 mL/Hr) IV Continuous <Continuous>  dextrose 50% Injectable 25 Gram(s) IV Push once  dextrose 50% Injectable 12.5 Gram(s) IV Push once  dextrose 50% Injectable 25 Gram(s) IV Push once  enoxaparin Injectable 70 milliGRAM(s) SubCutaneous every 12 hours  glucagon  Injectable 1 milliGRAM(s) IntraMuscular once  insulin lispro (ADMELOG) corrective regimen sliding scale   SubCutaneous Before meals and at bedtime  insulin lispro Injectable (ADMELOG) 3 Unit(s) SubCutaneous three times a day before meals  nystatin Powder 1 Application(s) Topical <User Schedule>  pantoprazole    Tablet 40 milliGRAM(s) Oral before breakfast  piperacillin/tazobactam IVPB.. 3.375 Gram(s) IV Intermittent every 8 hours  rOPINIRole 0.25 milliGRAM(s) Oral two times a day  thiamine Injectable 100 milliGRAM(s) IV Push daily    MEDICATIONS  (PRN):      Allergies    No Known Allergies    Intolerances        REVIEW OF SYSTEMS:  CONSTITUTIONAL: No fever or chills  HEENT:  No headache, no sore throat  RESPIRATORY: No cough, wheezing, or shortness of breath  CARDIOVASCULAR: No chest pain, palpitations  GASTROINTESTINAL: No abd pain, nausea, vomiting, or diarrhea  GENITOURINARY: No dysuria, frequency, or hematuria  NEUROLOGICAL: no focal weakness or dizziness  MUSCULOSKELETAL: no myalgias, +chronic back pain    Vital Signs Last 24 Hrs  T(C): 36.9 (05 Dec 2020 05:27), Max: 37.7 (04 Dec 2020 16:30)  T(F): 98.4 (05 Dec 2020 05:27), Max: 99.9 (04 Dec 2020 16:30)  HR: 87 (05 Dec 2020 07:22) (73 - 103)  BP: 147/66 (05 Dec 2020 07:00) (115/56 - 162/120)  BP(mean): 96 (05 Dec 2020 07:00) (79 - 136)  RR: 18 (05 Dec 2020 07:00) (15 - 32)  SpO2: 100% (05 Dec 2020 07:22) (100% - 100%)    PHYSICAL EXAM:  GENERAL: NAD, on NC  HEENT: MMM, anicteric, PEERL  CHEST/LUNG: diminished BS right  HEART:  RRR  ABDOMEN:  BS+, soft, nontender, nondistended, palpable device in LLQ  EXTREMITIES: + UE and LE edema, no cyanosis  NERVOUS SYSTEM: answers questions and follows commands     LABS:                        8.3    9.83  )-----------( 196      ( 05 Dec 2020 05:52 )             27.4     CBC Full  -  ( 05 Dec 2020 05:52 )  WBC Count : 9.83 K/uL  Hemoglobin : 8.3 g/dL  Hematocrit : 27.4 %  Platelet Count - Automated : 196 K/uL  Mean Cell Volume : 105.4 fl  Mean Cell Hemoglobin : 31.9 pg  Mean Cell Hemoglobin Concentration : 30.3 gm/dL  Auto Neutrophil # : x  Auto Lymphocyte # : x  Auto Monocyte # : x  Auto Eosinophil # : x  Auto Basophil # : x  Auto Neutrophil % : x  Auto Lymphocyte % : x  Auto Monocyte % : x  Auto Eosinophil % : x  Auto Basophil % : x    04 Dec 2020 09:44    137    |  97     |  52     ----------------------------<  237    4.3     |  33     |  0.72     Ca    7.4        04 Dec 2020 09:44  Phos  3.7       04 Dec 2020 09:44  Mg     1.9       04 Dec 2020 09:44    TPro  5.2    /  Alb  2.4    /  TBili  0.8    /  DBili  x      /  AST  197    /  ALT  435    /  AlkPhos  75     04 Dec 2020 09:44    PTT - ( 03 Dec 2020 18:36 )  PTT:25.4 sec    CAPILLARY BLOOD GLUCOSE      POCT Blood Glucose.: 115 mg/dL (05 Dec 2020 08:05)  POCT Blood Glucose.: 150 mg/dL (04 Dec 2020 21:31)  POCT Blood Glucose.: 328 mg/dL (04 Dec 2020 15:56)  POCT Blood Glucose.: 241 mg/dL (04 Dec 2020 12:10)  POCT Blood Glucose.: 245 mg/dL (04 Dec 2020 10:15)        Culture - Urine (collected 12-03-20 @ 01:28)  Source: .Urine Catheterized  Final Report (12-04-20 @ 02:36):    >=3 organisms. Probable collection contamination.    Culture - Blood (collected 12-03-20 @ 01:12)  Source: .Blood Blood-Peripheral  Preliminary Report (12-04-20 @ 02:01):    No growth to date.    Culture - Blood (collected 12-03-20 @ 01:12)  Source: .Blood Blood-Peripheral  Gram Stain (12-04-20 @ 09:56):    Growth in anaerobic bottle: Gram Positive Cocci in Clusters  Preliminary Report (12-04-20 @ 09:57):    Growth in anaerobic bottle: Gram Positive Cocci in Clusters    ***Blood Panel PCR results on this specimen are available    approximately 3 hours after the Gram stain result.***    Gram stain, PCR, and/or culture results may not always    correspond due todifference in methodologies.    ************************************************************    This PCR assay was performed using [employer].    The following targets are tested for: Enterococcus,    vancomycin resistant enterococci, Listeria monocytogenes,    coagulase negative staphylococci, S. aureus,    methicillin resistant S. aureus, Streptococcus agalactiae    (Group B), S. pneumoniae, S. pyogenes (Group A),    Acinetobacter baumannii, Enterobacter cloacae, E. coli,    Klebsiella oxytoca, K. pneumoniae, Proteus sp.,    Serratia marcescens, Haemophilus influenzae,    Neisseria meningitidis, Pseudomonas aeruginosa, Candida    albicans, C. glabrata, C krusei, C parapsilosis,    C. tropicalis and the KPC resistance gene.    "Due to technical problems, Proteussp. will Not be reported as part of    the BCID panel until further notice"  Organism: Blood Culture PCR (12-04-20 @ 11:26)  Organism: Blood Culture PCR (12-04-20 @ 11:26)      -  Coagulase negative Staphylococcus: Detec      Method Type: PCR        RADIOLOGY & ADDITIONAL TESTS:    Personally reviewed.     Consultant(s) Notes Reviewed:  [x] YES  [ ] NO

## 2020-12-05 NOTE — PROVIDER CONTACT NOTE (CRITICAL VALUE NOTIFICATION) - TEST AND RESULT REPORTED:
dec 2 bc growth anerobic bottle gram positive cocci clusters
ABG, paCO2: 94
ABG-PH & PCO2
ABG-PH, PCO2
lactate 2.7
trop 0.497

## 2020-12-05 NOTE — DISCHARGE NOTE PROVIDER - NSDCCPCAREPLAN_GEN_ALL_CORE_FT
PRINCIPAL DISCHARGE DIAGNOSIS  Diagnosis: Pulmonary embolism  Assessment and Plan of Treatment: You have a blood clot in both lungs, you were treated with blood thinners and will continue on them when you are in sub acute rehab.  You will continue on these blood thinners after you are done with rehab.       PRINCIPAL DISCHARGE DIAGNOSIS  Diagnosis: Pulmonary embolism  Assessment and Plan of Treatment: You have a blood clot in both lungs, you were treated with blood thinners and will continue on them when you are in sub acute rehab.  You will continue on these blood thinners after you are done with rehab.      SECONDARY DISCHARGE DIAGNOSES  Diagnosis: Acute respiratory failure with hypoxia and hypercapnia  Assessment and Plan of Treatment: You were on 5 days of antibiotics for possible HCAP.  You were seen by Pulmonary specialist and placed on BIPAP.  Continue Bipap at night.

## 2020-12-05 NOTE — PROGRESS NOTE ADULT - ASSESSMENT
74 yo male with pmhx spinal stenosis, osteomyelitis of lumbar vertebrae, type 2 DM not on insulin, HTN, CAD, TIA, HLD, LISBETH, Iron deficiency anemia, thiamine deficienicy , GERD presents [facility] admitted for suspected pneumonia now with acute hypoxic hypercapneic resp failure due to bilateral PE, pneumonia and CO2 retention 2/2 LISBETH.    Acute respiratory failure with hypoxia and hypercapnia   - Sent to ICU from the ED 12/3  - Due to bilateral PE, ?pneumonia and CO2 retention 2/2 LISBETH  - On NC this AM, was on BIPAP overnight  - See below for details/care per ICU  - cards and pulm consulted    Shock, likely cardiogenic  - possible cardiogenic  or  septic shock given PNA  - Now off vasopressors  - f/u blood cultures- ngtd    Metabolic encephalopathy likely due to hypercapnia  - now resolved  - Blood gas reviewed, CO2 improved yesterday but pt noncompliant with bipap use  - Bipap qhs/prn.  - care per ICU  - TSH wnl    Bilateral pulmonary embolism with RV strain  - s/p tPA 12/03, off heparin drip, on therapeutic lovenox  - f/u echo (RV enlarged) and LE dopplers (pending)    Pneumonia, suspect health care associated pneumonia  - Cont Zosyn   - f/u legionella  - Neg MRSA testing- now off vanco  - Covid negative    R/O ACS (acute coronary syndrome).   - Pt presenting with sob, elevated troponin and abnormal EKG- no prior for comparison, denies chest pain  - appr cardio consult  - c/w ASA, statin. Off plavix per cardio recs  - monitor on remote tele.     Macrocytic Anemia, likely of chronic disease  - Hgb down trending, rec. stool guaiac as pt on AC    HTN (hypertension)  - holding antihypertensive; lisinopril as pt was hypotensive    History of TIA (transient ischemic attack).   - c/w statin, ASA     Diabetes mellitus.   - hyperglycemic on presentation.   - hold metformin   - start insulin sliding scale, hypoglycemia protocol  - f/u a1c 6%     Spinal stenosis.  - pt with chronic pain, states hes been bedbound x several weeks  - has pain pump he says is no longer in use   - Urine drug screen negative    HLD (hyperlipidemia).   - c/w statin.     GERD (gastroesophageal reflux disease).    - continue protonix, interchange for omeprazole.     Prophylactic measure.   dvt ppx: heparin ggt

## 2020-12-05 NOTE — PROGRESS NOTE ADULT - SUBJECTIVE AND OBJECTIVE BOX
[facility] - Stacey Du, Luis Fernando, Bronwyn, Evangelist, Sivakumar Adame  Office Number:  [phone]    Patient resting comfortably in bed in NAD.  Laying flat with no respiratory distress.  No complaints of chest pain, dyspnea, palpitations, PND, or orthopnea. Off pressors.      F/U for:  PE    Telemetry:  SR.  No events    MEDICATIONS  (STANDING):  ascorbic acid 500 milliGRAM(s) Oral daily  aspirin enteric coated 81 milliGRAM(s) Oral daily  chlorhexidine 2% Cloths 1 Application(s) Topical <User Schedule>  dextrose 40% Gel 15 Gram(s) Oral once  dextrose 5%. 1000 milliLiter(s) (50 mL/Hr) IV Continuous <Continuous>  dextrose 5%. 1000 milliLiter(s) (100 mL/Hr) IV Continuous <Continuous>  dextrose 50% Injectable 25 Gram(s) IV Push once  dextrose 50% Injectable 12.5 Gram(s) IV Push once  dextrose 50% Injectable 25 Gram(s) IV Push once  enoxaparin Injectable 70 milliGRAM(s) SubCutaneous every 12 hours  glucagon  Injectable 1 milliGRAM(s) IntraMuscular once  insulin lispro (ADMELOG) corrective regimen sliding scale   SubCutaneous Before meals and at bedtime  insulin lispro Injectable (ADMELOG) 3 Unit(s) SubCutaneous three times a day before meals  nystatin Powder 1 Application(s) Topical <User Schedule>  pantoprazole    Tablet 40 milliGRAM(s) Oral before breakfast  piperacillin/tazobactam IVPB.. 3.375 Gram(s) IV Intermittent every 8 hours  rOPINIRole 0.25 milliGRAM(s) Oral two times a day  thiamine Injectable 100 milliGRAM(s) IV Push daily    MEDICATIONS  (PRN):      Allergies    No Known Allergies    Intolerances        Vital Signs Last 24 Hrs  T(C): 36.9 (05 Dec 2020 05:27), Max: 37.7 (04 Dec 2020 16:30)  T(F): 98.4 (05 Dec 2020 05:27), Max: 99.9 (04 Dec 2020 16:30)  HR: 87 (05 Dec 2020 07:22) (73 - 103)  BP: 147/66 (05 Dec 2020 07:00) (115/56 - 162/120)  BP(mean): 96 (05 Dec 2020 07:00) (79 - 136)  RR: 18 (05 Dec 2020 07:00) (15 - 32)  SpO2: 100% (05 Dec 2020 07:22) (100% - 100%)    I&O's Summary    04 Dec 2020 07:01  -  05 Dec 2020 07:00  --------------------------------------------------------  IN: 2600 mL / OUT: 2405 mL / NET: 195 mL        ON EXAM:    General:  NAD, well developed, well nourished  HEENT: Mucous membranes are dry, anicteric  Lungs: Non-labored, breath sounds are clear bilaterally, No wheezing, rales or rhonchi.  Coarse bs bl  Cardiovascular: Regular, S1 and S2, no murmurs, rubs, or gallops  Gastrointestinal: Bowel Sounds present, soft, nontender.   Lymph: Minimal peripheral edema. No lymphadenopathy.  Skin: No rashes or ulcers  Psych:  Unable to properly assess      LABS: All Labs Reviewed:                        8.3    9.83  )-----------( 196      ( 05 Dec 2020 05:52 )             27.4                         9.5    15.85 )-----------( 205      ( 04 Dec 2020 09:44 )             29.9                         11.9   23.97 )-----------( 200      ( 03 Dec 2020 18:36 )             38.0     04 Dec 2020 09:44    137    |  97     |  52     ----------------------------<  237    4.3     |  33     |  0.72   03 Dec 2020 18:38    140    |  98     |  63     ----------------------------<  95     4.6     |  38     |  0.73   03 Dec 2020 06:22    136    |  95     |  57     ----------------------------<  324    5.2     |  32     |  0.85     Ca    7.4        04 Dec 2020 09:44  Ca    8.3        03 Dec 2020 18:38  Ca    8.5        03 Dec 2020 06:22  Phos  3.7       04 Dec 2020 09:44  Phos  7.7       03 Dec 2020 18:38  Mg     1.9       04 Dec 2020 09:44  Mg     2.1       03 Dec 2020 18:38  Mg     2.1       02 Dec 2020 18:46    TPro  5.2    /  Alb  2.4    /  TBili  0.8    /  DBili  x      /  AST  197    /  ALT  435    /  AlkPhos  75     04 Dec 2020 09:44  TPro  7.2    /  Alb  3.6    /  TBili  0.6    /  DBili  x      /  AST  22     /  ALT  50     /  AlkPhos  92     02 Dec 2020 18:46    PTT - ( 03 Dec 2020 18:36 )  PTT:25.4 sec  CARDIAC MARKERS ( 04 Dec 2020 09:44 )  .293 ng/mL / x     / 47 U/L / x     / x      CARDIAC MARKERS ( 03 Dec 2020 18:38 )  .497 ng/mL / x     / 31 U/L / x     / 3.1 ng/mL  CARDIAC MARKERS ( 03 Dec 2020 09:56 )  .060 ng/mL / x     / 20 U/L / x     / 2.0 ng/mL      Blood Culture: Organism --  Gram Stain Blood -- Gram Stain --  Specimen Source .Urine Catheterized  Culture-Blood --    Organism Blood Culture PCR  Gram Stain Blood -- Gram Stain   Growth in anaerobic bottle: Gram Positive Cocci in Clusters  Specimen Source .Blood Blood-Peripheral  Culture-Blood --      12-02 @ 18:46  Pro Bnp [id number]    12-03 @ 09:56  TSH: 2.53  12-02 @ 18:46  TSH: 2.60

## 2020-12-05 NOTE — DISCHARGE NOTE PROVIDER - HOSPITAL COURSE
HPI:  74 yo m pmh spinal stenosis, osteomyelitis of lumbar vertebrae, type 2 DM not on insulin, HTN, CAD, TIA, HLD, LISBETH, Iron deficiency anemia, thiamine deficienicy , GERD presents from [facility]. Pt is poor historian, states he does not know why he is here. denies any pain, respiratory distress. All ROS neg. Per chart review from [facility] nurse noted that patient was not acting like himself, he appeared to be in respiratory distress. Vitals were obtained and pt was afebrile, bp WNL, but he was hypoxic in the 80's and had wheezing on exam. Nurse placed pt on O2 and gave a nebulizer treatment. Physician evaluated pt and noted pt was still in respiratory distress, c/o shortness of breath and asking to open the windows for air. Pt was on 100% NRB. He denied chest pain, abd pain, nausea or vomiting. Exam at that time revealed dec breath sounds, fine crepts @ bases, trace lower extremity edema, cold to touch +pulses. Pt repeatedly asking please help. Facility then transfered pt to ED.     In the ED pt vital signs were wnl. He was no longer complaining of shortness of breath. he was sating high 90's - 100% on RA.  Labs obtained revealed WBC 13.21, H&H 12.5/41.0, Co2 32, Bun 55, glucose 265, troponin .068, probnp [id number], UA grossly WNL. RVP neg. Pt was given vancomycin and zosyn x 1. 1L NS. Tylenol 650mg x 1.    (02 Dec 2020 21:02)      ---  HOSPITAL COURSE: Patient admitted with possible PNA, RRT called for hypoxia, hypotension and altered mental status. Patient found to have acute right ventricle dilation and signs of right heart strain. CTA showing b/l PEs. Patient receved TPA and pressors with improvement in his status. He developed CO2 retention and was placed on biPAP with improvement in his mental status.     ---  CONSULTANTS:     ---  TIME SPENT:  I, the attending physician, was physically present for the key portions of the evaluation and management (E/M) service provided. The total amount of time spent reviewing the hospital notes, laboratory values, imaging findings, assessing/counseling the patient, discussing with consultant physicians, social work, nursing staff was -- minutes    ---  Primary care provider was made aware of plan for discharge:      [  ] NO     [  ] YES     HPI:  76 yo m pmh spinal stenosis, osteomyelitis of lumbar vertebrae, type 2 DM not on insulin, HTN, CAD, TIA, HLD, LISBETH, Iron deficiency anemia, thiamine deficienicy , GERD presents from [facility]. Pt is poor historian, states he does not know why he is here. denies any pain, respiratory distress. All ROS neg. Per chart review from [facility] nurse noted that patient was not acting like himself, he appeared to be in respiratory distress. Vitals were obtained and pt was afebrile, bp WNL, but he was hypoxic in the 80's and had wheezing on exam. Nurse placed pt on O2 and gave a nebulizer treatment. Physician evaluated pt and noted pt was still in respiratory distress, c/o shortness of breath and asking to open the windows for air. Pt was on 100% NRB. He denied chest pain, abd pain, nausea or vomiting. Exam at that time revealed dec breath sounds, fine crepts @ bases, trace lower extremity edema, cold to touch +pulses. Pt repeatedly asking please help. Facility then transfered pt to ED.     In the ED pt vital signs were wnl. He was no longer complaining of shortness of breath. he was sating high 90's - 100% on RA.  Labs obtained revealed WBC 13.21, H&H 12.5/41.0, Co2 32, Bun 55, glucose 265, troponin .068, probnp 54934, UA grossly WNL. RVP neg. Pt was given vancomycin and zosyn x 1. 1L NS. Tylenol 650mg x 1.    (02 Dec 2020 21:02)      ---  HOSPITAL COURSE: Patient admitted with possible PNA, RRT called for hypoxia, hypotension and altered mental status. Patient found to have acute right ventricle dilation and signs of right heart strain. CTA showing b/l PEs. Patient receved TPA and pressors with improvement in his status. He developed CO2 retention and was placed on biPAP with improvement in his mental status.  MBS was completed.  Speech recommended to continue Mechanical soft solids (dysphagia 2) with thin liquids.  Aspiration/reflux precautions. 3. Safe swallowing guidelines: feed only when fully awake/alert, feed only when on NC or room air, position pt upright 90 degrees, administer small bite/sip, complete full mastication of mechanical soft solids, alternate bit/sip, pace meal slowly, and remain upright for 30 minutes post swallow.  Pt was on full dose lovenox but then switched to eliquis when going to [facility].    PT evaluated patient and recommended [facility].     Please see progress note for vitals and PE.       ---  TIME SPENT:  I, the attending physician, was physically present for the key portions of the evaluation and management (E/M) service provided. The total amount of time spent reviewing the hospital notes, laboratory values, imaging findings, assessing/counseling the patient, discussing with consultant physicians, social work, nursing staff was 45 minutes    ---  Primary care provider was made aware of plan for discharge:      [  ] NO     [  ] YES     HPI:  76 yo m pmh spinal stenosis, osteomyelitis of lumbar vertebrae, type 2 DM not on insulin, HTN, CAD, TIA, HLD, LISBETH, Iron deficiency anemia, thiamine deficienicy , GERD presents from [facility]. Pt is poor historian, states he does not know why he is here. denies any pain, respiratory distress. All ROS neg. Per chart review from [facility] nurse noted that patient was not acting like himself, he appeared to be in respiratory distress. Vitals were obtained and pt was afebrile, bp WNL, but he was hypoxic in the 80's and had wheezing on exam. Nurse placed pt on O2 and gave a nebulizer treatment. Physician evaluated pt and noted pt was still in respiratory distress, c/o shortness of breath and asking to open the windows for air. Pt was on 100% NRB. He denied chest pain, abd pain, nausea or vomiting. Exam at that time revealed dec breath sounds, fine crepts @ bases, trace lower extremity edema, cold to touch +pulses. Pt repeatedly asking please help. Facility then transfered pt to ED.     In the ED pt vital signs were wnl. He was no longer complaining of shortness of breath. he was sating high 90's - 100% on RA.  Labs obtained revealed WBC 13.21, H&H 12.5/41.0, Co2 32, Bun 55, glucose 265, troponin .068, probnp 01140, UA grossly WNL. RVP neg. Pt was given vancomycin and zosyn x 1. 1L NS. Tylenol 650mg x 1.    (02 Dec 2020 21:02)      ---  HOSPITAL COURSE: Patient admitted with possible PNA, RRT called for hypoxia, hypotension and altered mental status. Patient found to have acute right ventricle dilation and signs of right heart strain. CTA showing b/l PEs. Patient receved TPA and pressors with improvement in his status. He developed CO2 retention and was placed on biPAP with improvement in his mental status.  He received 5 days of Zosyn for likely possible HCAP.  MBS was completed.  Speech recommended to continue Mechanical soft solids (dysphagia 2) with thin liquids.  Aspiration/reflux precautions. 3. Safe swallowing guidelines: feed only when fully awake/alert, feed only when on NC or room air, position pt upright 90 degrees, administer small bite/sip, complete full mastication of mechanical soft solids, alternate bit/sip, pace meal slowly, and remain upright for 30 minutes post swallow.  Pt was on full dose lovenox but then switched to eliquis when going to [facility].    PT evaluated patient and recommended [facility].     Vital Signs Last 24 Hrs  T(C): 37.2 (11 Dec 2020 04:26), Max: 37.5 (10 Dec 2020 16:01)  T(F): 98.9 (11 Dec 2020 04:26), Max: 99.5 (10 Dec 2020 16:01)  HR: 87 (11 Dec 2020 04:26) (85 - 99)  BP: 143/83 (11 Dec 2020 04:26) (126/71 - 152/79)  BP(mean): --  RR: 18 (11 Dec 2020 04:26) (18 - 19)  SpO2: 96% (11 Dec 2020 04:26) (93% - 98%)    GENERAL: NAD  HEENT:  NC, anicteric, moist mucous membranes  CHEST/LUNG: good air entry b/L, on nasal canula  HEART:  RRR, S1, S2  ABDOMEN:  BS+, soft, nontender, nondistended  EXTREMITIES: +UE and LE edema (right UE edema, 2+ pulse, warm and pink extremities)  NERVOUS SYSTEM: answers questions and follows commands appropriately    ---  TIME SPENT:  I, the attending physician, was physically present for the key portions of the evaluation and management (E/M) service provided. The total amount of time spent reviewing the hospital notes, laboratory values, imaging findings, assessing/counseling the patient, discussing with consultant physicians, social work, nursing staff was 45 minutes    ---  Primary care provider was made aware of plan for discharge:      [  ] NO     [  ] YES

## 2020-12-06 NOTE — PROGRESS NOTE ADULT - ASSESSMENT
76 y/o male with PMHx as above here with respiratory distress and RHF 2/2 to pulmonary embolism.    Assessment:  1. Acute hypoxic respiratory failure- PE high on differential, concern for acute RV failure as well, r/o pneumonia/infectious process given CXR, covid neg  2. Shock- concern for obstructive given POCUS findings vs acute RV failure, r/o septic  3. Encephalopathy  4. R/o sepsis    Plan:   Neuro  -lethargic this AM 2/2 hypercarbia. Improved with bipap.   -Continue Ropinirole and Thiamine.     CV  -Right heart strain/ heart failure 2/2 to PE, now resolving. Cardiac enzymes improving.   -Echo from 2019 w/ normal RV/unremarkable  -Continue home meds atorvastatin and aspirin.  -Holding clopidogrel and lisinopril.  -On bedside cardiac US possible thrombus on tricuspid valve. No mass appreciated on formal TTE.     RESP  -continue biPAP for hypercarbia     GI  -pureed, protonix.   -speech and swallow Monday    RENAL  -Monitor renal indices and replete lytes prn.  -Monitor fluids, avoid volume overload.     ID  -BCx growing coag negative staph in one bottle, likely contamination.   -Continue antibiotics Zosyn, and vanco.   -Staph aureus pos, MRSA neg, Legionella neg.     HEME   -Aspirin and atorvastatin Full dose lovenox for PE.   -S/p tPA and heparin drip.     ENDO  -Sliding scale insulin. Monitor BG.     DISPO: Status improved, Stable for transfer to med/surg w/ remote tele and continuous pulse ox. 76 y/o male with PMHx as above here with respiratory distress and RHF 2/2 to pulmonary embolism.    Assessment:  1. Acute hypoxic respiratory failure- PE high on differential, concern for acute RV failure as well, r/o pneumonia/infectious process given CXR, covid neg  2. Shock- concern for obstructive given POCUS findings vs acute RV failure, r/o septic  3. Encephalopathy  4. R/o sepsis    Plan:   Neuro  -lethargic this AM 2/2 hypercarbia. Improved with bipap. Monitor O2 on continuous pulse ox.   -Continue Ropinirole and Thiamine.     CV  -Right heart strain/ heart failure 2/2 to PE, now resolving. Cardiac enzymes improving.   -Echo from 2019 w/ normal RV/unremarkable  -Continue home meds atorvastatin and aspirin.  -Restarted Lisinopril.   -On bedside cardiac US possible thrombus on tricuspid valve. No mass appreciated on formal TTE.   -Lasix 10mg x 1 ordered for volume overload/ BLE edema.     RESP  -continue NI Vent during the night. nasal cannula during day time.   -Hypoxia likely secondary to overnight LISBETH.      GI  -pureed, protonix.   -speech and swallow Monday    RENAL  -Monitor renal indices and replete lytes prn.  -Monitor fluids, avoid volume overload. Lasix 10mg x1 ordered today.     ID  -BCx growing coag negative staph in one bottle, likely contamination.   -Continue antibiotics Zosyn to end 12/7  -Staph aureus pos, MRSA neg, Legionella neg.     HEME   -Aspirin and atorvastatin Full dose lovenox for PE.   -S/p tPA and heparin drip.     ENDO  -Sliding scale insulin. Monitor BG.     DISPO: Status improved, Stable for transfer to med/surg w/ remote tele and continuous pulse ox. 76 y/o male with PMHx as above here with respiratory distress and RHF 2/2 to pulmonary embolism.    Assessment:  1. Acute hypoxic respiratory failure- PE high on differential, concern for acute RV failure as well, r/o pneumonia/infectious process given CXR, covid neg  2. Shock- concern for obstructive given POCUS findings vs acute RV failure, r/o septic  3. Encephalopathy  4. R/o sepsis    Plan:   Neuro  -lethargic this AM 2/2 hypercarbia. Improved with bipap. Monitor O2 on continuous pulse ox.   -Continue Ropinirole and Thiamine.     CV  -Right heart strain/ heart failure 2/2 to PE, now resolving. Cardiac enzymes improving.   -Echo from 2019 w/ normal RV/unremarkable  -Continue home meds atorvastatin and aspirin.  -Restarted Lisinopril.   -On bedside cardiac US possible thrombus on tricuspid valve. No mass appreciated on formal TTE.   -Lasix 10mg x 1 ordered for volume overload/ BLE edema.     RESP  -continue NI Vent during the night. nasal cannula during day time.   -Hypoxia likely secondary to overnight LISBETH.      GI  -pureed, protonix.   -speech and swallow Monday    RENAL  -Monitor renal indices and replete lytes prn.  -Monitor fluids, avoid volume overload. Lasix 10mg x1 ordered today.     ID  -BCx growing coag negative staph in one bottle, likely contamination.   -Continue antibiotics Zosyn to end 12/7  -Staph aureus pos, MRSA neg, Legionella neg.     HEME   -Aspirin and atorvastatin Full dose lovenox for PE.   -S/p tPA and heparin drip.     ENDO  -Sliding scale insulin. Monitor BG.     DISPO: Status improved, Stable for transfer to med/surg w/ remote tele and continuous pulse ox.     Discussed plan of care/ status with daughter @[id number] 12/6

## 2020-12-06 NOTE — ED ADULT NURSE REASSESSMENT NOTE - NS ED NURSE REASSESS COMMENT FT1
pt c/o back pain medicated as ordered, repositioned, apple sauce and water given.  Tolerated well.  will continue to monitor.
Pt rested comfortably through out the night, encouragement needed for bipap, no signs of acute distress noted.
pt drowsy/lethargic, arousable.  pt can recall his , oriented to person, and place, remains poor historian.  am BP 82/50.  Dr Stinson notified of hypotension.  pt mental status is unchanged.  pt remains tachycardic -112 maintaining o2 sat via room air.  zestril, requip held as per Dr Stinson.  pt cough is wet, unproductive.  no intervention for BP at this time as per Dr Stinson, plan to be evaluated by cardiology.

## 2020-12-06 NOTE — PROGRESS NOTE ADULT - PROBLEM SELECTOR PLAN 1
on o2 support - NC - awake - alert - verbal -   BG noted  use of NIPPV -   76 y/o male with hx HTN, HLD, DM2, CAD, LISBETH, chronic pain syndrome with intrathecal and abdominal pumps (inactive)  s/p TLC - for acute PE with RV strain - on lovenox now for PE -   on emp ABX for sepsis - eval for LRTI -   I and O -   monitor HD and VS - tapered off pressors -   keep sat > 88 pct  cardio f/u noted - TTE reviewed -.

## 2020-12-06 NOTE — PROGRESS NOTE ADULT - ASSESSMENT
74 yo m pmh spinal stenosis, osteomyelitis of lumbar vertebrae, type 2 DM not on insulin, HTN, CAD, TIA, HLD, LISBETH, Iron deficiency anemia, thiamine deficienicy, GERD presents from [facility] admit for suspected pneumonia.    Pt initially presented with SOB and possible PNA. Initial CXR with bibasilar atelectasis/airspace disease and small right pleural effusion. Pt with worsening SOB rapid response called for hypoxia and hypotension. Pt also found to be tachycardiac. POCUS with bedside ultrasound during rapid response showed dilated RV, hyperdynamic LV function. Pt placed on NRB, admitting to chest pain and SOB. Pt admitted to ICU. Current EKG with S1Q3T3 consistent with PE. CTA shows Bilateral pulmonary emboli with CT findings suggesting right heart strain and small-to-moderate right pleural effusion. tPA given.  Bedside echo with signs of RV failure  Pressors now off    Recommendations   - Pressors weaned to off.  Maintaining hemodynamic stability  - Troponin trend not consistent with ACS.  Rather, it is consistent with RV failure in the setting of massive PE with hemodynamic collapse  - Official echo reviewed.  RV dilation with decreased RV function. Limited echo with no mass associated with tricuspid valve  - Hold plavix 75 qd  - Continue baby aspirin  - s/p TPA  - FUll dose Lovenox to be continued  - Can resume statin when able  - Monitor hemodynamics closely. Patient at risk for abrupt decompensation  - Respiratory support with BIPAP as needed.  Became acidotic and hypercarbic yesterday.    - Monitor and replete lytes, keep K>4, Mg>2.  - Other cardiovascular workup will depend on clinical course.  - All other workup per ICU team.  - Will continue to follow.

## 2020-12-06 NOTE — CHART NOTE - NSCHARTNOTEFT_GEN_A_CORE
Assessment: Pt seen in ICU for nutrition follow-up. As per chart pt is a spinal stenosis, osteomyelitis of lumbar vertebrae, type 2 DM not on insulin, HTN, CAD, TIA, HLD, LISBETH, Iron deficiency anemia, thiamine deficienicy , GERD presents from [facility] admitted for suspected pneumonia now with acute hypoxic hypercapneic resp failure due to bilateral PE, pneumonia and CO2 retention 2/2 LISBETH.    Pt seen at bedside, sleeping at time of visit. Pt was seen by SLP on 12/4 recommending Soft solids with thin liquids. Pt was then reevaluated by SLP on 12/5 recommending puree. Per RN pt was very lethargic at time of evaluation and was only able to participate in the puree trials. RN states that the pt became more alert during the yesterday afternoon and has been hungry and asking for food since then. Pending SLP eval on Monday. Pt with good appetite and PO intake, pt also know to have been asking for additional food throughout the day. No GI distress noted at this time, +BM 12/5.        Factors impacting intake: [ ] none [ ] nausea  [ ] vomiting [ ] diarrhea [ ] constipation  [ ]chewing problems [ x] swallowing issues  [ ] other:     Diet Presciption: Diet, Pureed (12-05-20 @ 11:59)    Intake: good     Current Weight: no updated weight in chart  Admission Weight: 151.4lbs  % Weight Change-recommend to obtain pt's updated body weight     Pertinent Medications: MEDICATIONS  (STANDING):  ascorbic acid 500 milliGRAM(s) Oral daily  aspirin enteric coated 81 milliGRAM(s) Oral daily  chlorhexidine 2% Cloths 1 Application(s) Topical <User Schedule>  dextrose 40% Gel 15 Gram(s) Oral once  dextrose 5%. 1000 milliLiter(s) (50 mL/Hr) IV Continuous <Continuous>  dextrose 5%. 1000 milliLiter(s) (100 mL/Hr) IV Continuous <Continuous>  dextrose 50% Injectable 25 Gram(s) IV Push once  dextrose 50% Injectable 12.5 Gram(s) IV Push once  dextrose 50% Injectable 25 Gram(s) IV Push once  enoxaparin Injectable 70 milliGRAM(s) SubCutaneous every 12 hours  glucagon  Injectable 1 milliGRAM(s) IntraMuscular once  insulin lispro (ADMELOG) corrective regimen sliding scale   SubCutaneous Before meals and at bedtime  lisinopril 5 milliGRAM(s) Oral daily  nystatin Powder 1 Application(s) Topical <User Schedule>  pantoprazole    Tablet 40 milliGRAM(s) Oral before breakfast  piperacillin/tazobactam IVPB.. 3.375 Gram(s) IV Intermittent every 8 hours  potassium phosphate IVPB 15 milliMole(s) IV Intermittent <User Schedule>  rOPINIRole 0.25 milliGRAM(s) Oral two times a day  thiamine 100 milliGRAM(s) Oral daily    MEDICATIONS  (PRN):    Pertinent Labs: 12-06 Na141 mmol/L Glu 119 mg/dL<H> K+ 3.6 mmol/L Cr  0.23 mg/dL<L> BUN 16 mg/dL 12-06 Phos 1.2 mg/dL<L> 12-06 Alb 2.5 g/dL<L>, Hgb 7.8. Hct 26.2, Calcium 7.9     CAPILLARY BLOOD GLUCOSE    POCT Blood Glucose.: 125 mg/dL (06 Dec 2020 07:35)  POCT Blood Glucose.: 278 mg/dL (05 Dec 2020 23:12)  POCT Blood Glucose.: 194 mg/dL (05 Dec 2020 17:05)  POCT Blood Glucose.: 82 mg/dL (05 Dec 2020 11:40)    Skin: Stage 2 sacrum pressure injury; Suspected DTI of right greater toe and right second toe  +1 generalized, right arm edema     Estimated Needs:   [x ] no change since previous assessment  [ ] recalculated:     Previous Nutrition Diagnosis:    [ x]Inadequate Oral Intake    [ x] Increased Nutrient Needs    Nutrition Diagnosis is   [x ] resolved- Inadequate Oral intake   [x ] ongoing- Increased protein Needs: being addressed with adequate PO intake and ordering of oral nutritional supplement     New Nutrition Diagnosis: [ x] not applicable       Interventions: Continue with current Puree diet as it remains medically appropriate   Recommend  [x ] Change Diet To: Recommend to add Consistent CHO diet restriction   [x ] Nutrition Supplement: Pt requesting for oral nutritional supplement, recommend Glucerna BID.   [ ] Nutrition Support  [x ] Other:   1) Continue to encourage adequate PO intake   2) Recommend MVI/daily  3) Monitor pt's PO intake, weight, skin, edema, GI distress       Monitoring and Evaluation:   [ x] PO intake [ x ] Tolerance to diet prescription [ x ] weights [ x ] labs[ x ] follow up per protocol  [x ] other: RD to remain available

## 2020-12-06 NOTE — PROGRESS NOTE ADULT - SUBJECTIVE AND OBJECTIVE BOX
Date/Time Patient Seen:  		  Referring MD:   Data Reviewed	       Patient is a 75y old  Male who presents with a chief complaint of Resp Distress (05 Dec 2020 15:26)      Subjective/HPI     PAST MEDICAL & SURGICAL HISTORY:  Bedbound    H/O osteomyelitis    GERD (gastroesophageal reflux disease)    HLD (hyperlipidemia)    HTN (hypertension)    TIA (transient ischemic attack)    Spinal stenosis    Obesity (BMI 30-39.9)    History of pancreatitis  ~ 1990, 1992    History of ventral hernia    Diabetic peripheral neuropathy    Radial styloid tenosynovitis    Carpal tunnel syndrome on both sides    Spinal stenosis of lumbar region    Anemia    Hyperlipidemia    Hypertension    Type 2 diabetes mellitus    Sleep apnea    History of right knee surgery  &quot; Removal of right knee tumor&quot; ,  1959    History of appendectomy  17 years ago          Medication list         MEDICATIONS  (STANDING):  ascorbic acid 500 milliGRAM(s) Oral daily  aspirin enteric coated 81 milliGRAM(s) Oral daily  chlorhexidine 2% Cloths 1 Application(s) Topical <User Schedule>  dextrose 40% Gel 15 Gram(s) Oral once  dextrose 5%. 1000 milliLiter(s) (50 mL/Hr) IV Continuous <Continuous>  dextrose 5%. 1000 milliLiter(s) (100 mL/Hr) IV Continuous <Continuous>  dextrose 50% Injectable 25 Gram(s) IV Push once  dextrose 50% Injectable 12.5 Gram(s) IV Push once  dextrose 50% Injectable 25 Gram(s) IV Push once  enoxaparin Injectable 70 milliGRAM(s) SubCutaneous every 12 hours  glucagon  Injectable 1 milliGRAM(s) IntraMuscular once  insulin lispro (ADMELOG) corrective regimen sliding scale   SubCutaneous Before meals and at bedtime  nystatin Powder 1 Application(s) Topical <User Schedule>  pantoprazole    Tablet 40 milliGRAM(s) Oral before breakfast  piperacillin/tazobactam IVPB.. 3.375 Gram(s) IV Intermittent every 8 hours  rOPINIRole 0.25 milliGRAM(s) Oral two times a day  thiamine 100 milliGRAM(s) Oral daily    MEDICATIONS  (PRN):         Vitals log        ICU Vital Signs Last 24 Hrs  T(C): 37.1 (06 Dec 2020 05:14), Max: 37.1 (06 Dec 2020 05:14)  T(F): 98.8 (06 Dec 2020 05:14), Max: 98.8 (06 Dec 2020 05:14)  HR: 71 (06 Dec 2020 06:00) (62 - 90)  BP: 157/70 (06 Dec 2020 06:00) (132/59 - 173/79)  BP(mean): 100 (06 Dec 2020 06:00) (85 - 113)  ABP: --  ABP(mean): --  RR: 20 (06 Dec 2020 06:00) (9 - 34)  SpO2: 100% (06 Dec 2020 06:00) (96% - 100%)           Input and Output:  I&O's Detail    04 Dec 2020 07:01  -  05 Dec 2020 07:00  --------------------------------------------------------  IN:    IV PiggyBack: 300 mL    IV PiggyBack: 250 mL    Oral Fluid: 2050 mL  Total IN: 2600 mL    OUT:    dextrose 5% + lactated ringers: 0 mL    Indwelling Catheter - Urethral (mL): 2405 mL    Norepinephrine: 0 mL  Total OUT: 2405 mL    Total NET: 195 mL      05 Dec 2020 07:01  -  06 Dec 2020 06:36  --------------------------------------------------------  IN:    IV PiggyBack: 300 mL    Oral Fluid: 1290 mL  Total IN: 1590 mL    OUT:    Incontinent per Condom Catheter (mL): 650 mL    Indwelling Catheter - Urethral (mL): 650 mL  Total OUT: 1300 mL    Total NET: 290 mL          Lab Data                        8.3    9.83  )-----------( 196      ( 05 Dec 2020 05:52 )             27.4     12-05    141  |  100  |  24<H>  ----------------------------<  64<L>  4.1   |  36<H>  |  0.30<L>    Ca    8.2<L>      05 Dec 2020 16:45  Phos  2.4     12-05  Mg     2.0     12-05    TPro  5.2<L>  /  Alb  2.4<L>  /  TBili  0.8  /  DBili  x   /  AST  197<H>  /  ALT  435<H>  /  AlkPhos  75  12-04    ABG - ( 05 Dec 2020 12:22 )  pH, Arterial: 7.26  pH, Blood: x     /  pCO2: 94    /  pO2: 69    / HCO3: 36    / Base Excess: 13.4  /  SaO2: 94                CARDIAC MARKERS ( 04 Dec 2020 09:44 )  .293 ng/mL / x     / 47 U/L / x     / x            Review of Systems	      Objective     Physical Examination    heart s1s2  lung dec BS  abd soft  head nc  head at      Pertinent Lab findings & Imaging      Daron:  NO   Adequate UO     I&O's Detail    04 Dec 2020 07:01  -  05 Dec 2020 07:00  --------------------------------------------------------  IN:    IV PiggyBack: 300 mL    IV PiggyBack: 250 mL    Oral Fluid: 2050 mL  Total IN: 2600 mL    OUT:    dextrose 5% + lactated ringers: 0 mL    Indwelling Catheter - Urethral (mL): 2405 mL    Norepinephrine: 0 mL  Total OUT: 2405 mL    Total NET: 195 mL      05 Dec 2020 07:01  -  06 Dec 2020 06:36  --------------------------------------------------------  IN:    IV PiggyBack: 300 mL    Oral Fluid: 1290 mL  Total IN: 1590 mL    OUT:    Incontinent per Condom Catheter (mL): 650 mL    Indwelling Catheter - Urethral (mL): 650 mL  Total OUT: 1300 mL    Total NET: 290 mL               Discussed with:     Cultures:	        Radiology

## 2020-12-06 NOTE — PROGRESS NOTE ADULT - SUBJECTIVE AND OBJECTIVE BOX
Patient is a 75y old  Male who presents with a chief complaint of Resp Distress (06 Dec 2020 07:09)    24 hour events: ***    REVIEW OF SYSTEMS  Constitutional: No fever, chills, fatigue  Neuro: No headache, numbness, weakness  Resp: No cough, wheezing, shortness of breath  CVS: No chest pain, palpitations, leg swelling  GI: Stating that he is hungry, begging for food.       T(F): 98.8 (12-06-20 @ 05:14), Max: 98.8 (12-06-20 @ 05:14)  HR: 74 (12-06-20 @ 07:00) (62 - 90)  BP: 166/75 (12-06-20 @ 07:00) (132/59 - 173/79)  RR: 28 (12-06-20 @ 07:00) (9 - 34)  SpO2: 100% (12-06-20 @ 07:00) (96% - 100%)  Wt(kg): --            I&O's Summary    12-05 @ 07:01  -  12-06 @ 07:00  --------------------------------------------------------  IN: 1590 mL / OUT: 1300 mL / NET: 290 mL      PHYSICAL EXAM  Constitutional: Pt in NAD, lethargic, easily awoken   HEENT NC/AT, moist mucous membranes  Pulmonary: CTA b/l  Cardiovascular: +S1, S2, RRR, no murmur  Gastrointestinal: Soft, nontender, nondistended, normoactive bowel sounds  Extremities: No peripheral edema   Neurological: AAOx2   Skin: No obvious lesions/rashes    MEDICATIONS  piperacillin/tazobactam IVPB.. IV Intermittent      dextrose 40% Gel Oral  dextrose 50% Injectable IV Push  dextrose 50% Injectable IV Push  dextrose 50% Injectable IV Push  glucagon  Injectable IntraMuscular  insulin lispro (ADMELOG) corrective regimen sliding scale SubCutaneous      rOPINIRole Oral      aspirin enteric coated Oral  enoxaparin Injectable SubCutaneous    pantoprazole    Tablet Oral      ascorbic acid Oral  dextrose 5%. IV Continuous  dextrose 5%. IV Continuous  magnesium sulfate  IVPB IV Intermittent  potassium phosphate IVPB IV Intermittent  thiamine Oral      chlorhexidine 2% Cloths Topical  nystatin Powder Topical                            7.8    7.92  )-----------( 173      ( 06 Dec 2020 06:31 )             26.2       12-06    141  |  97  |  16  ----------------------------<  119<H>  3.6   |  40<H>  |  0.23<L>    Ca    7.9<L>      06 Dec 2020 06:31  Phos  1.2     12-06  Mg     1.9     12-06    TPro  5.2<L>  /  Alb  2.5<L>  /  TBili  0.8  /  DBili  .40<H>  /  AST  34  /  ALT  190<H>  /  AlkPhos  62  12-06      CARDIAC MARKERS ( 04 Dec 2020 09:44 )  .293 ng/mL / x     / 47 U/L / x     / x              .Urine Catheterized   >=3 organisms. Probable collection contamination. -- 12-03 @ 01:28  .Blood Blood-Peripheral   Growth in anaerobic bottle: Staphylococcus hominis  Coag Negative Staphylococcus  Single set isolate, possible contaminant. Contact  Microbiology if susceptibility testing clinically  indicated.  ***Blood Panel PCR results on this specimen are available  approximately 3 hours after the Gram stain result.***  Gram stain, PCR, and/or culture results may not always  correspond due to difference in methodologies.  ************************************************************  This PCR assay was performed using [employer].  The following targets are tested for: Enterococcus,  vancomycin resistant enterococci, Listeria monocytogenes,  coagulase negative staphylococci, S. aureus,  methicillin resistant S. aureus, Streptococcus agalactiae  (Group B), S. pneumoniae, S. pyogenes (Group A),  Acinetobacter baumannii, Enterobacter cloacae, E. coli,  Klebsiella oxytoca, K. pneumoniae, Proteus sp.,  Serratia marcescens, Haemophilus influenzae,  Neisseria meningitidis, Pseudomonas aeruginosa, Candida  albicans, C. glabrata, C krusei, C parapsilosis,  C. tropicalis and the KPC resistance gene.  "Due to technical problems, Proteus sp. will Not be reported as part of  the BCID panel until further notice"   Growth in anaerobic bottle: Gram Positive Cocci in Clusters 12-03 @ 01:12      Rapid RVP Result: NotDetec (12-02 @ 18:46)    Radiology: ***  Bedside lung ultrasound: ***  Bedside ECHO: ***    HARDY: Y                   A-LINE: N     GLOBAL ISSUE/BEST PRACTICE  Analgesia: N  Sedation: N  CAM-ICU:   HOB elevation: yes  Stress ulcer prophylaxis:   VTE prophylaxis: Y  Glycemic control: Y  Nutrition: Diabetic diet    CODE STATUS: DNR  GOC discussion: Y     Patient is a 75y old  Male who presents with a chief complaint of Resp Distress (06 Dec 2020 07:09)    24 hour events: Pt seen and examined this morning, he is awake and alert, in normal baseline state of mind. Yesterday he had some confusion, and was found to have a Respiratory acidosis. He was started on BiPAP with improvement of his status, his home lisinopril was restarted.     REVIEW OF SYSTEMS  Constitutional: No fever, chills, fatigue  Neuro: No headache, numbness, weakness  Resp: No cough, wheezing, shortness of breath  CVS: No chest pain, palpitations, leg swelling  GI: Stating that he is hungry, begging for food.       T(F): 98.8 (12-06-20 @ 05:14), Max: 98.8 (12-06-20 @ 05:14)  HR: 74 (12-06-20 @ 07:00) (62 - 90)  BP: 166/75 (12-06-20 @ 07:00) (132/59 - 173/79)  RR: 28 (12-06-20 @ 07:00) (9 - 34)  SpO2: 100% (12-06-20 @ 07:00) (96% - 100%)  Wt(kg): --        I&O's Summary    12-05 @ 07:01  -  12-06 @ 07:00  --------------------------------------------------------  IN: 1590 mL / OUT: 1300 mL / NET: 290 mL      PHYSICAL EXAM  Constitutional: Pt in NAD, lethargic, easily awoken   HEENT NC/AT, moist mucous membranes  Pulmonary: CTA b/l  Cardiovascular: +S1, S2, RRR, no murmur  Gastrointestinal: Soft, nontender, nondistended, normoactive bowel sounds  Extremities: BLE edema.   Neurological: AAOx2   Skin: No obvious lesions/rashes    MEDICATIONS  piperacillin/tazobactam IVPB.. IV Intermittent      dextrose 40% Gel Oral  dextrose 50% Injectable IV Push  dextrose 50% Injectable IV Push  dextrose 50% Injectable IV Push  glucagon  Injectable IntraMuscular  insulin lispro (ADMELOG) corrective regimen sliding scale SubCutaneous      rOPINIRole Oral      aspirin enteric coated Oral  enoxaparin Injectable SubCutaneous    pantoprazole    Tablet Oral      ascorbic acid Oral  dextrose 5%. IV Continuous  dextrose 5%. IV Continuous  magnesium sulfate  IVPB IV Intermittent  potassium phosphate IVPB IV Intermittent  thiamine Oral      chlorhexidine 2% Cloths Topical  nystatin Powder Topical                            7.8    7.92  )-----------( 173      ( 06 Dec 2020 06:31 )             26.2       12-06    141  |  97  |  16  ----------------------------<  119<H>  3.6   |  40<H>  |  0.23<L>    Ca    7.9<L>      06 Dec 2020 06:31  Phos  1.2     12-06  Mg     1.9     12-06    TPro  5.2<L>  /  Alb  2.5<L>  /  TBili  0.8  /  DBili  .40<H>  /  AST  34  /  ALT  190<H>  /  AlkPhos  62  12-06      CARDIAC MARKERS ( 04 Dec 2020 09:44 )  .293 ng/mL / x     / 47 U/L / x     / x              .Urine Catheterized   >=3 organisms. Probable collection contamination. -- 12-03 @ 01:28  .Blood Blood-Peripheral   Growth in anaerobic bottle: Staphylococcus hominis  Coag Negative Staphylococcus  Single set isolate, possible contaminant. Contact  Microbiology if susceptibility testing clinically  indicated.  ***Blood Panel PCR results on this specimen are available  approximately 3 hours after the Gram stain result.***  Gram stain, PCR, and/or culture results may not always  correspond due to difference in methodologies.  ************************************************************  This PCR assay was performed using [employer].  The following targets are tested for: Enterococcus,  vancomycin resistant enterococci, Listeria monocytogenes,  coagulase negative staphylococci, S. aureus,  methicillin resistant S. aureus, Streptococcus agalactiae  (Group B), S. pneumoniae, S. pyogenes (Group A),  Acinetobacter baumannii, Enterobacter cloacae, E. coli,  Klebsiella oxytoca, K. pneumoniae, Proteus sp.,  Serratia marcescens, Haemophilus influenzae,  Neisseria meningitidis, Pseudomonas aeruginosa, Candida  albicans, C. glabrata, C krusei, C parapsilosis,  C. tropicalis and the KPC resistance gene.  "Due to technical problems, Proteus sp. will Not be reported as part of  the BCID panel until further notice"   Growth in anaerobic bottle: Gram Positive Cocci in Clusters 12-03 @ 01:12      Rapid RVP Result: NotDetec (12-02 @ 18:46)    Radiology: ***  Bedside lung ultrasound: ***  Bedside ECHO: ***    HARDY: Y                   A-LINE: N     GLOBAL ISSUE/BEST PRACTICE  Analgesia: N  Sedation: N  CAM-ICU:   HOB elevation: yes  Stress ulcer prophylaxis:   VTE prophylaxis: Y  Glycemic control: Y  Nutrition: Diabetic diet    CODE STATUS: DNR  GOC discussion: Y

## 2020-12-06 NOTE — PROGRESS NOTE ADULT - SUBJECTIVE AND OBJECTIVE BOX
[facility] - Stacey Du, Luis Fernando, [place], Evangelist, Sivakumar Adame  Office Number:  [phone]    Patient resting comfortably in bed in NAD.  Laying flat with no respiratory distress.  No complaints of chest pain, dyspnea, palpitations, PND, or orthopnea.  He is complaining of being hungry.  He became hypercarbic and acidotic yesterday, and required BIPAP.  Better today.    F/U for:  PE with obstructive shock.    Telemetry:  SR    MEDICATIONS  (STANDING):  ascorbic acid 500 milliGRAM(s) Oral daily  aspirin enteric coated 81 milliGRAM(s) Oral daily  chlorhexidine 2% Cloths 1 Application(s) Topical <User Schedule>  dextrose 40% Gel 15 Gram(s) Oral once  dextrose 5%. 1000 milliLiter(s) (50 mL/Hr) IV Continuous <Continuous>  dextrose 5%. 1000 milliLiter(s) (100 mL/Hr) IV Continuous <Continuous>  dextrose 50% Injectable 25 Gram(s) IV Push once  dextrose 50% Injectable 12.5 Gram(s) IV Push once  dextrose 50% Injectable 25 Gram(s) IV Push once  enoxaparin Injectable 70 milliGRAM(s) SubCutaneous every 12 hours  glucagon  Injectable 1 milliGRAM(s) IntraMuscular once  insulin lispro (ADMELOG) corrective regimen sliding scale   SubCutaneous Before meals and at bedtime  nystatin Powder 1 Application(s) Topical <User Schedule>  pantoprazole    Tablet 40 milliGRAM(s) Oral before breakfast  piperacillin/tazobactam IVPB.. 3.375 Gram(s) IV Intermittent every 8 hours  rOPINIRole 0.25 milliGRAM(s) Oral two times a day  thiamine 100 milliGRAM(s) Oral daily           Allergies    No Known Allergies             Vital Signs Last 24 Hrs  T(C): 37.1 (06 Dec 2020 05:14), Max: 37.1 (06 Dec 2020 05:14)  T(F): 98.8 (06 Dec 2020 05:14), Max: 98.8 (06 Dec 2020 05:14)  HR: 74 (06 Dec 2020 07:00) (62 - 90)  BP: 166/75 (06 Dec 2020 07:00) (132/59 - 173/79)  BP(mean): 108 (06 Dec 2020 07:00) (85 - 113)  RR: 28 (06 Dec 2020 07:00) (9 - 34)  SpO2: 100% (06 Dec 2020 07:00) (96% - 100%)    I&O's Summary    05 Dec 2020 07:01  -  06 Dec 2020 07:00  --------------------------------------------------------  IN: 1590 mL / OUT: 1300 mL / NET: 290 mL        ON EXAM:    General:  NAD, well developed, well nourished  HEENT: Mucous membranes are dry, anicteric  Lungs: Non-labored, breath sounds are clear bilaterally, No wheezing, rales or rhonchi.  Coarse bs bl  Cardiovascular: Regular, S1 and S2, no murmurs, rubs, or gallops  Gastrointestinal: Bowel Sounds present, soft, nontender.   Lymph: Minimal peripheral edema. No lymphadenopathy.  Skin: No rashes or ulcers  Psych:  Mood and affect appropriate for clinical situation.      LABS: All Labs Reviewed:                        7.8    7.92  )-----------( 173      ( 06 Dec 2020 06:31 )             26.2                         8.3    9.83  )-----------( 196      ( 05 Dec 2020 05:52 )             27.4                         9.5    15.85 )-----------( 205      ( 04 Dec 2020 09:44 )             29.9     06 Dec 2020 06:31    141    |  97     |  16     ----------------------------<  119    3.6     |  40     |  0.23   05 Dec 2020 16:45    141    |  100    |  24     ----------------------------<  64     4.1     |  36     |  0.30   04 Dec 2020 09:44    137    |  97     |  52     ----------------------------<  237    4.3     |  33     |  0.72     Ca    7.9        06 Dec 2020 06:31  Ca    8.2        05 Dec 2020 16:45  Ca    7.4        04 Dec 2020 09:44  Phos  1.2       06 Dec 2020 06:31  Phos  2.4       05 Dec 2020 16:45  Phos  3.7       04 Dec 2020 09:44  Mg     1.9       06 Dec 2020 06:31  Mg     2.0       05 Dec 2020 16:45  Mg     1.9       04 Dec 2020 09:44    TPro  5.2    /  Alb  2.5    /  TBili  0.8    /  DBili  .40    /  AST  34     /  ALT  190    /  AlkPhos  62     06 Dec 2020 06:31  TPro  5.2    /  Alb  2.4    /  TBili  0.8    /  DBili  x      /  AST  197    /  ALT  435    /  AlkPhos  75     04 Dec 2020 09:44      CARDIAC MARKERS ( 04 Dec 2020 09:44 )  .293 ng/mL / x     / 47 U/L / x     / x          Blood Culture: Organism --  Gram Stain Blood -- Gram Stain --  Specimen Source .Urine Catheterized  Culture-Blood --    Organism Blood Culture PCR  Gram Stain Blood -- Gram Stain   Growth in anaerobic bottle: Gram Positive Cocci in Clusters  Specimen Source .Blood Blood-Peripheral  Culture-Blood --        12-03 @ 09:56  TSH: 2.53

## 2020-12-06 NOTE — PROGRESS NOTE ADULT - ASSESSMENT
74 yo male with pmhx spinal stenosis, osteomyelitis of lumbar vertebrae, type 2 DM not on insulin, HTN, CAD, TIA, HLD, LISBETH, Iron deficiency anemia, thiamine deficienicy , GERD presents from [facility] admitted for suspected pneumonia now with acute hypoxic hypercapneic resp failure due to bilateral PE, pneumonia and CO2 retention 2/2 LISBETH.    Acute respiratory failure with hypoxia and hypercapnia   - Sent to ICU from the ED 12/3  - Due to bilateral PE, pneumonia and hypercarbia 2/2 LISBETH  - On NC this AM, was on BIPAP overnight  - See below for details/care per ICU  - cards and pulm consulted    Shock, likely cardiogenic  - possible cardiogenic or septic shock given PNA  - Off vasopressors  - f/u blood cultures- 1 of 2 with Staph (?contamin)  - repeat Cx    Metabolic encephalopathy likely due to hypercapnia  - Blood gas reviewed, CO2 improved yesterday but pt noncompliant with bipap use  - Bipap qhs/prn.  - care per ICU  - TSH wnl    Bilateral pulmonary embolism with RV strain  - s/p tPA 12/03, off heparin drip, on therapeutic lovenox  - f/u echo (RV enlarged) and LE dopplers (pending)    Pneumonia, suspect health care associated pneumonia  - Continue IV Zosyn   - f/u legionella  - Neg MRSA testing- now off vancomycin  - Covid negative x 2    R/O ACS (acute coronary syndrome).   - Pt presenting with sob, elevated troponin and abnormal EKG- no prior for comparison, denies chest pain  - Appr cardio consult  - c/w ASA, statin. Off plavix per cardio recs  - monitor on remote tele.     Macrocytic Anemia, likely of chronic disease  - Hgb down trending, rec. stool guaiac as pt on AC    HTN (hypertension)  - holding antihypertensive; lisinopril as pt was hypotensive    History of TIA (transient ischemic attack).   - c/w statin, ASA.     Diabetes mellitus.   - hyperglycemic on presentation.   - hold metformin   - start insulin sliding scale, hypoglycemia protocol  - f/u a1c 6%     Spinal stenosis.  - pt with chronic pain, states hes been bedbound x several weeks  - has pain pump he says is no longer in use   - Urine drug screen negative    HLD (hyperlipidemia).   - c/w statin.     GERD (gastroesophageal reflux disease).    - continue protonix, interchange for omeprazole.     Prophylactic measure.   dvt ppx: heparin ggt

## 2020-12-06 NOTE — PROGRESS NOTE ADULT - SUBJECTIVE AND OBJECTIVE BOX
Patient is a 75y old  Male who presents with a chief complaint of Resp Distress (06 Dec 2020 07:27)    INTERVAL HPI/OVERNIGHT EVENTS: Patient seen and examined at bedside in ICU. No acute overnight events occurred. Patient has no complaints at this time. Denies fevers, chills, headache, lightheadedness, chest pain, dyspnea, abdominal pain, n/v/d/c.    MEDICATIONS  (STANDING):  ascorbic acid 500 milliGRAM(s) Oral daily  aspirin enteric coated 81 milliGRAM(s) Oral daily  chlorhexidine 2% Cloths 1 Application(s) Topical <User Schedule>  dextrose 40% Gel 15 Gram(s) Oral once  dextrose 5%. 1000 milliLiter(s) (50 mL/Hr) IV Continuous <Continuous>  dextrose 5%. 1000 milliLiter(s) (100 mL/Hr) IV Continuous <Continuous>  dextrose 50% Injectable 25 Gram(s) IV Push once  dextrose 50% Injectable 12.5 Gram(s) IV Push once  dextrose 50% Injectable 25 Gram(s) IV Push once  enoxaparin Injectable 70 milliGRAM(s) SubCutaneous every 12 hours  glucagon  Injectable 1 milliGRAM(s) IntraMuscular once  insulin lispro (ADMELOG) corrective regimen sliding scale   SubCutaneous Before meals and at bedtime  lisinopril 5 milliGRAM(s) Oral daily  nystatin Powder 1 Application(s) Topical <User Schedule>  pantoprazole    Tablet 40 milliGRAM(s) Oral before breakfast  piperacillin/tazobactam IVPB.. 3.375 Gram(s) IV Intermittent every 8 hours  potassium phosphate IVPB 15 milliMole(s) IV Intermittent <User Schedule>  rOPINIRole 0.25 milliGRAM(s) Oral two times a day  thiamine 100 milliGRAM(s) Oral daily    MEDICATIONS  (PRN):      Allergies    No Known Allergies    Intolerances        REVIEW OF SYSTEMS:  CONSTITUTIONAL: No fever or chills  HEENT:  No headache, no sore throat  RESPIRATORY: No cough, wheezing, or shortness of breath  CARDIOVASCULAR: No chest pain, palpitations  GASTROINTESTINAL: No abd pain, nausea, vomiting, or diarrhea  GENITOURINARY: No dysuria, frequency, or hematuria  NEUROLOGICAL: no focal weakness or dizziness  MUSCULOSKELETAL: no myalgias     Vital Signs Last 24 Hrs  T(C): 37.1 (06 Dec 2020 05:14), Max: 37.1 (06 Dec 2020 05:14)  T(F): 98.8 (06 Dec 2020 05:14), Max: 98.8 (06 Dec 2020 05:14)  HR: 77 (06 Dec 2020 09:00) (62 - 88)  BP: 162/74 (06 Dec 2020 09:00) (132/59 - 175/75)  BP(mean): 106 (06 Dec 2020 09:00) (85 - 113)  RR: 18 (06 Dec 2020 09:00) (10 - 34)  SpO2: 100% (06 Dec 2020 09:00) (96% - 100%)    PHYSICAL EXAM:  GENERAL: NAD  HEENT:  anicteric, moist mucous membranes  CHEST/LUNG:  CTA b/l, no rales, wheezes, or rhonchi  HEART:  RRR, S1, S2  ABDOMEN:  BS+, soft, nontender, nondistended  EXTREMITIES: no edema, cyanosis, or calf tenderness  NERVOUS SYSTEM: answers questions and follows commands appropriately    LABS:                        7.8    7.92  )-----------( 173      ( 06 Dec 2020 06:31 )             26.2     CBC Full  -  ( 06 Dec 2020 06:31 )  WBC Count : 7.92 K/uL  Hemoglobin : 7.8 g/dL  Hematocrit : 26.2 %  Platelet Count - Automated : 173 K/uL  Mean Cell Volume : 105.2 fl  Mean Cell Hemoglobin : 31.3 pg  Mean Cell Hemoglobin Concentration : 29.8 gm/dL  Auto Neutrophil # : 6.31 K/uL  Auto Lymphocyte # : 0.70 K/uL  Auto Monocyte # : 0.67 K/uL  Auto Eosinophil # : 0.20 K/uL  Auto Basophil # : 0.01 K/uL  Auto Neutrophil % : 79.7 %  Auto Lymphocyte % : 8.8 %  Auto Monocyte % : 8.5 %  Auto Eosinophil % : 2.5 %  Auto Basophil % : 0.1 %    06 Dec 2020 06:31    141    |  97     |  16     ----------------------------<  119    3.6     |  40     |  0.23     Ca    7.9        06 Dec 2020 06:31  Phos  1.2       06 Dec 2020 06:31  Mg     1.9       06 Dec 2020 06:31    TPro  5.2    /  Alb  2.5    /  TBili  0.8    /  DBili  .40    /  AST  34     /  ALT  190    /  AlkPhos  62     06 Dec 2020 06:31        CAPILLARY BLOOD GLUCOSE      POCT Blood Glucose.: 125 mg/dL (06 Dec 2020 07:35)  POCT Blood Glucose.: 278 mg/dL (05 Dec 2020 23:12)  POCT Blood Glucose.: 194 mg/dL (05 Dec 2020 17:05)  POCT Blood Glucose.: 82 mg/dL (05 Dec 2020 11:40)        Culture - Urine (collected 12-03-20 @ 01:28)  Source: .Urine Catheterized  Final Report (12-04-20 @ 02:36):    >=3 organisms. Probable collection contamination.    Culture - Blood (collected 12-03-20 @ 01:12)  Source: .Blood Blood-Peripheral  Preliminary Report (12-04-20 @ 02:01):    No growth to date.    Culture - Blood (collected 12-03-20 @ 01:12)  Source: .Blood Blood-Peripheral  Gram Stain (12-04-20 @ 09:56):    Growth in anaerobic bottle: Gram Positive Cocci in Clusters  Final Report (12-05-20 @ 12:07):    Growth in anaerobic bottle: Staphylococcus hominis    Coag Negative Staphylococcus    Single set isolate, possible contaminant. Contact    Microbiology if susceptibility testing clinically    indicated.    ***Blood Panel PCR results on this specimen are available    approximately 3 hours after the Gram stain result.***    Gram stain, PCR, and/or culture results may not always    correspond due to difference in methodologies.    ************************************************************    This PCR assay was performed using [employer].    The following targets are tested for: Enterococcus,    vancomycin resistant enterococci, Listeria monocytogenes,    coagulase negative staphylococci, S. aureus,    methicillin resistant S. aureus, Streptococcus agalactiae    (Group B), S. pneumoniae, S. pyogenes (Group A),    Acinetobacter baumannii, Enterobacter cloacae, E. coli,    Klebsiella oxytoca, K. pneumoniae, Proteus sp.,    Serratia marcescens, Haemophilus influenzae,    Neisseria meningitidis, Pseudomonas aeruginosa, Candida    albicans, C. glabrata, C krusei, C parapsilosis,    C. tropicalis and the KPC resistance gene.    "Due to technical problems, Proteus sp. will Not be reported as part of    the BCID panel until further notice"  Organism: Blood Culture PCR (12-05-20 @ 12:07)  Organism: Blood Culture PCR (12-05-20 @ 12:07)      -  Coagulase negative Staphylococcus: Detec      Method Type: PCR        RADIOLOGY & ADDITIONAL TESTS:    Personally reviewed.     Consultant(s) Notes Reviewed:  [x] YES  [ ] NO     Patient is a 75y old  Male who presents with a chief complaint of Resp Distress (06 Dec 2020 07:27)    INTERVAL HPI/OVERNIGHT EVENTS: Patient seen and examined at bedside in ICU. No acute overnight events occurred. Patient has no new complaints at this time. Per nurse he used bipap a little better overnight compared to prev night.     MEDICATIONS  (STANDING):  ascorbic acid 500 milliGRAM(s) Oral daily  aspirin enteric coated 81 milliGRAM(s) Oral daily  chlorhexidine 2% Cloths 1 Application(s) Topical <User Schedule>  dextrose 40% Gel 15 Gram(s) Oral once  dextrose 5%. 1000 milliLiter(s) (50 mL/Hr) IV Continuous <Continuous>  dextrose 5%. 1000 milliLiter(s) (100 mL/Hr) IV Continuous <Continuous>  dextrose 50% Injectable 25 Gram(s) IV Push once  dextrose 50% Injectable 12.5 Gram(s) IV Push once  dextrose 50% Injectable 25 Gram(s) IV Push once  enoxaparin Injectable 70 milliGRAM(s) SubCutaneous every 12 hours  glucagon  Injectable 1 milliGRAM(s) IntraMuscular once  insulin lispro (ADMELOG) corrective regimen sliding scale SubCutaneous Before meals and at bedtime  lisinopril 5 milliGRAM(s) Oral daily  nystatin Powder 1 Application(s) Topical <User Schedule>  pantoprazole    Tablet 40 milliGRAM(s) Oral before breakfast  piperacillin/tazobactam IVPB.. 3.375 Gram(s) IV Intermittent every 8 hours  potassium phosphate IVPB 15 milliMole(s) IV Intermittent <User Schedule>  rOPINIRole 0.25 milliGRAM(s) Oral two times a day  thiamine 100 milliGRAM(s) Oral daily    MEDICATIONS  (PRN):    Allergies - No Known Allergies    Intolerances    REVIEW OF SYSTEMS:  CONSTITUTIONAL: No fever or chills  HEENT:  No headache, no sore throat  RESPIRATORY: No cough, wheezing, or shortness of breath  CARDIOVASCULAR: No chest pain, palpitations  GASTROINTESTINAL: No abd pain, nausea, vomiting, or diarrhea  GENITOURINARY: No dysuria, frequency, or hematuria  NEUROLOGICAL: no focal weakness or dizziness  MUSCULOSKELETAL: no myalgias, +chronic back pain    Vital Signs Last 24 Hrs  T(C): 37.3 (06 Dec 2020 10:00), Max: 37.3 (06 Dec 2020 10:00)  T(F): 99.2 (06 Dec 2020 10:00), Max: 99.2 (06 Dec 2020 10:00)  HR: 98 (06 Dec 2020 12:00) (62 - 98)  BP: 115/58 (06 Dec 2020 12:00) (115/58 - 175/75)  BP(mean): 81 (06 Dec 2020 12:00) (81 - 113)  RR: 29 (06 Dec 2020 12:00) (16 - 34)  SpO2: 96% (06 Dec 2020 12:00) (96% - 100%)    PHYSICAL EXAM:  GENERAL: NAD, on NC  HEENT: MMM, anicteric, PEERL  CHEST/LUNG: diminished BS   HEART:  RRR  ABDOMEN:  BS+, soft, nontender, nondistended, palpable device in LLQ  EXTREMITIES: + UE and LE edema, no cyanosis  NERVOUS SYSTEM: answers questions and follows commands     LABS:                        7.8    7.92  )-----------( 173      ( 06 Dec 2020 06:31 )             26.2     CBC Full  -  ( 06 Dec 2020 06:31 )  WBC Count : 7.92 K/uL  Hemoglobin : 7.8 g/dL  Hematocrit : 26.2 %  Platelet Count - Automated : 173 K/uL  Mean Cell Volume : 105.2 fl  Mean Cell Hemoglobin : 31.3 pg  Mean Cell Hemoglobin Concentration : 29.8 gm/dL  Auto Neutrophil # : 6.31 K/uL  Auto Lymphocyte # : 0.70 K/uL  Auto Monocyte # : 0.67 K/uL  Auto Eosinophil # : 0.20 K/uL  Auto Basophil # : 0.01 K/uL  Auto Neutrophil % : 79.7 %  Auto Lymphocyte % : 8.8 %  Auto Monocyte % : 8.5 %  Auto Eosinophil % : 2.5 %  Auto Basophil % : 0.1 %    06 Dec 2020 06:31    141    |  97     |  16     ----------------------------<  119    3.6     |  40     |  0.23     Ca    7.9        06 Dec 2020 06:31  Phos  1.2       06 Dec 2020 06:31  Mg     1.9       06 Dec 2020 06:31    TPro  5.2    /  Alb  2.5    /  TBili  0.8    /  DBili  .40    /  AST  34     /  ALT  190    /  AlkPhos  62     06 Dec 2020 06:31        CAPILLARY BLOOD GLUCOSE      POCT Blood Glucose.: 125 mg/dL (06 Dec 2020 07:35)  POCT Blood Glucose.: 278 mg/dL (05 Dec 2020 23:12)  POCT Blood Glucose.: 194 mg/dL (05 Dec 2020 17:05)  POCT Blood Glucose.: 82 mg/dL (05 Dec 2020 11:40)        Culture - Urine (collected 12-03-20 @ 01:28)  Source: .Urine Catheterized  Final Report (12-04-20 @ 02:36):    >=3 organisms. Probable collection contamination.    Culture - Blood (collected 12-03-20 @ 01:12)  Source: .Blood Blood-Peripheral  Preliminary Report (12-04-20 @ 02:01):    No growth to date.    Culture - Blood (collected 12-03-20 @ 01:12)  Source: .Blood Blood-Peripheral  Gram Stain (12-04-20 @ 09:56):    Growth in anaerobic bottle: Gram Positive Cocci in Clusters  Final Report (12-05-20 @ 12:07):    Growth in anaerobic bottle: Staphylococcus hominis    Coag Negative Staphylococcus    Single set isolate, possible contaminant. Contact    Microbiology if susceptibility testing clinically    indicated.    ***Blood Panel PCR results on this specimen are available    approximately 3 hours after the Gram stain result.***    Gram stain, PCR, and/or culture results may not always    correspond due to difference in methodologies.    ************************************************************    This PCR assay was performed using [employer].    The following targets are tested for: Enterococcus,    vancomycin resistant enterococci, Listeria monocytogenes,    coagulase negative staphylococci, S. aureus,    methicillin resistant S. aureus, Streptococcus agalactiae    (Group B), S. pneumoniae, S. pyogenes (Group A),    Acinetobacter baumannii, Enterobacter cloacae, E. coli,    Klebsiella oxytoca, K. pneumoniae, Proteus sp.,    Serratia marcescens, Haemophilus influenzae,    Neisseria meningitidis, Pseudomonas aeruginosa, Candida    albicans, C. glabrata, C krusei, C parapsilosis,    C. tropicalis and the KPC resistance gene.    "Due to technical problems, Proteus sp. will Not be reported as part of    the BCID panel until further notice"  Organism: Blood Culture PCR (12-05-20 @ 12:07)  Organism: Blood Culture PCR (12-05-20 @ 12:07)      -  Coagulase negative Staphylococcus: Detec      Method Type: PCR        RADIOLOGY & ADDITIONAL TESTS:    Personally reviewed.     Consultant(s) Notes Reviewed:  [x] YES  [ ] NO

## 2020-12-07 NOTE — PROGRESS NOTE ADULT - ASSESSMENT
74 y/o male with PMHx as above here with respiratory distress and RHF 2/2 to pulmonary embolism.    Assessment:  1. Acute hypoxic respiratory failure- PE high on differential, concern for acute RV failure as well, r/o pneumonia/infectious process given CXR, covid neg  2. Shock- concern for obstructive given POCUS findings vs acute RV failure, r/o septic  3. Encephalopathy  4. R/o sepsis    Plan:   Neuro  -lethargic this AM 2/2 hypercarbia. Improved with bipap. Monitor O2 on continuous pulse ox.   -Continue Ropinirole and Thiamine.     CV  -Right heart strain/ heart failure 2/2 to PE, now resolving. Cardiac enzymes improving.   -Echo from 2019 w/ normal RV/unremarkable  -Continue home meds atorvastatin and aspirin.  -Restarted Lisinopril.   -On bedside cardiac US possible thrombus on tricuspid valve. No mass appreciated on formal TTE.   -Lasix 10mg x 1 ordered for volume overload/ BLE edema.     RESP  -continue NI Vent during the night. nasal cannula during day time.   -Hypoxia likely secondary to overnight LISBETH.      GI  -pureed, protonix.   -speech and swallow Monday    RENAL  -Monitor renal indices and replete lytes prn.  -Monitor fluids, avoid volume overload. Lasix 10mg x1 ordered today.     ID  -BCx growing coag negative staph in one bottle, likely contamination.   -Continue antibiotics Zosyn to end 12/7  -Staph aureus pos, MRSA neg, Legionella neg.     HEME   -Aspirin and atorvastatin Full dose lovenox for PE.   -S/p tPA and heparin drip.     ENDO  -Sliding scale insulin. Monitor BG.     DISPO: Status improved, Stable for transfer to med/surg w/ remote tele and continuous pulse ox. 76 y/o male with PMHx as above here with respiratory distress and RHF 2/2 to pulmonary embolism.    Assessment:  1. Acute hypoxic respiratory failure- PE high on differential, concern for acute RV failure as well, r/o pneumonia/infectious process given CXR, covid neg  2. Shock- concern for obstructive given POCUS findings vs acute RV failure, r/o septic  3. Encephalopathy  4. R/o sepsis    Plan:   Neuro  -lethargic this AM 2/2 hypercarbia. Improved with bipap. Monitor O2 on continuous pulse ox.   -Continue Ropinirole and Thiamine.     CV  -Right heart strain/ heart failure 2/2 to PE, now resolving. Cardiac enzymes improving.   -Echo from 2019 w/ normal RV/unremarkable  -Continue home meds atorvastatin and aspirin.  -Restarted Lisinopril.   -On bedside cardiac US possible thrombus on tricuspid valve. No mass appreciated on formal TTE.   -Lasix 10mg x 1 ordered for volume overload/ BLE edema.     RESP  -continue NI Vent during the night. nasal cannula during day time.   -Hypoxia likely secondary to overnight LISBETH.      GI  -pureed, protonix.   -speech and swallow Monday    RENAL  -Monitor renal indices and replete lytes prn.  -Monitor fluids, avoid volume overload. Lasix 10mg x1 ordered today.     ID  -BCx growing coag negative staph in one bottle, likely contamination.   -Continue antibiotics Zosyn to end 12/7  -Staph aureus pos, MRSA neg, Legionella neg.     HEME   -Aspirin and atorvastatin Full dose lovenox for PE.   -S/p tPA and heparin drip.     ENDO  -Sliding scale insulin. Monitor BG.     DISPO: Status improved, Transfer to [facility]

## 2020-12-07 NOTE — SWALLOW BEDSIDE ASSESSMENT ADULT - SWALLOW EVAL: CURRENT DIET
mechanical soft solids with thin liquids, per MD order
puree with thin liquids, per MD order
Regular solids with thin liquids per MD order

## 2020-12-07 NOTE — SWALLOW BEDSIDE ASSESSMENT ADULT - ORAL PHASE
suspected posterior loss Within functional limits Delayed oral transit time/Decreased anterior-posterior movement of the bolus

## 2020-12-07 NOTE — SWALLOW BEDSIDE ASSESSMENT ADULT - SWALLOW EVAL: RECOMMENDED FEEDING/EATING TECHNIQUES
position upright (90 degrees)/small sips/bites/allow for swallow between intakes/crush medication (when feasible)/alternate food with liquid/no straws/maintain upright posture during/after eating for 30 mins/oral hygiene
crush medication (when feasible)/position upright (90 degrees)/maintain upright posture during/after eating for 30 mins/oral hygiene/alternate food with liquid/small sips/bites/allow for swallow between intakes

## 2020-12-07 NOTE — PROGRESS NOTE ADULT - SUBJECTIVE AND OBJECTIVE BOX
Patient is a 75y old  Male who presents with a chief complaint of Resp Distress (07 Dec 2020 11:20)    INTERVAL HPI/OVERNIGHT EVENTS: Patient seen and examined at bedside this AM in ICU. No acute overnight events occurred. Patient has no new complaints at this time. Used bipap qhs.  Denies fevers, chills, headache, lightheadedness, chest pain, dyspnea, abdominal pain, n/v/d/c.    MEDICATIONS  (STANDING):  ascorbic acid 500 milliGRAM(s) Oral daily  aspirin enteric coated 81 milliGRAM(s) Oral daily  chlorhexidine 2% Cloths 1 Application(s) Topical <User Schedule>  dextrose 40% Gel 15 Gram(s) Oral once  dextrose 5%. 1000 milliLiter(s) (50 mL/Hr) IV Continuous <Continuous>  dextrose 5%. 1000 milliLiter(s) (100 mL/Hr) IV Continuous <Continuous>  dextrose 50% Injectable 25 Gram(s) IV Push once  dextrose 50% Injectable 12.5 Gram(s) IV Push once  dextrose 50% Injectable 25 Gram(s) IV Push once  enoxaparin Injectable 70 milliGRAM(s) SubCutaneous every 12 hours  glucagon  Injectable 1 milliGRAM(s) IntraMuscular once  insulin lispro (ADMELOG) corrective regimen sliding scale   SubCutaneous Before meals and at bedtime  lisinopril 5 milliGRAM(s) Oral daily  nystatin Powder 1 Application(s) Topical <User Schedule>  pantoprazole    Tablet 40 milliGRAM(s) Oral before breakfast  piperacillin/tazobactam IVPB.. 3.375 Gram(s) IV Intermittent every 8 hours  rOPINIRole 0.25 milliGRAM(s) Oral two times a day  thiamine 100 milliGRAM(s) Oral daily    MEDICATIONS  (PRN):  oxyCODONE    IR 5 milliGRAM(s) Oral every 6 hours PRN Moderate Pain (4 - 6)    Allergies  No Known Allergies  Intolerances    REVIEW OF SYSTEMS:  CONSTITUTIONAL: No fever or chills  HEENT:  No headache, no sore throat  RESPIRATORY: No cough, wheezing, or shortness of breath  CARDIOVASCULAR: No chest pain, palpitations  GASTROINTESTINAL: No abd pain, nausea, vomiting, or diarrhea  GENITOURINARY: No dysuria, frequency, or hematuria  NEUROLOGICAL: no focal weakness or dizziness  MUSCULOSKELETAL: no myalgias, +chronic back pain    Vital Signs Last 24 Hrs  T(C): 37.3 (07 Dec 2020 15:31), Max: 37.4 (06 Dec 2020 20:56)  T(F): 99.1 (07 Dec 2020 15:31), Max: 99.4 (06 Dec 2020 20:56)  HR: 79 (07 Dec 2020 15:31) (67 - 96)  BP: 105/62 (07 Dec 2020 15:31) (101/58 - 152/77)  BP(mean): 82 (07 Dec 2020 11:00) (74 - 108)  RR: 18 (07 Dec 2020 15:31) (16 - 31)  SpO2: 98% (07 Dec 2020 15:31) (93% - 100%)    PHYSICAL EXAM:  GENERAL: NAD, on NC  HEENT: MMM, anicteric, PEERL  CHEST/LUNG: no wheezing or rhonchi apprec  HEART:  RRR  ABDOMEN:  BS+, soft, nontender, nondistended, palpable device in LLQ  EXTREMITIES: + UE and LE edema, no cyanosis  NERVOUS SYSTEM: answers questions and follows commands     LABS:                        7.8    7.01  )-----------( 195      ( 07 Dec 2020 05:54 )             25.7     CBC Full  -  ( 07 Dec 2020 05:54 )  WBC Count : 7.01 K/uL  Hemoglobin : 7.8 g/dL  Hematocrit : 25.7 %  Platelet Count - Automated : 195 K/uL  Mean Cell Volume : 103.6 fl  Mean Cell Hemoglobin : 31.5 pg  Mean Cell Hemoglobin Concentration : 30.4 gm/dL  Auto Neutrophil # : x  Auto Lymphocyte # : x  Auto Monocyte # : x  Auto Eosinophil # : x  Auto Basophil # : x  Auto Neutrophil % : x  Auto Lymphocyte % : x  Auto Monocyte % : x  Auto Eosinophil % : x  Auto Basophil % : x    07 Dec 2020 05:54    141    |  96     |  11     ----------------------------<  130    3.6     |  42     |  0.25     Ca    7.7        07 Dec 2020 05:54  Phos  2.2       07 Dec 2020 05:54  Mg     1.8       07 Dec 2020 05:54    TPro  5.2    /  Alb  2.4    /  TBili  0.7    /  DBili  x      /  AST  20     /  ALT  135    /  AlkPhos  59     07 Dec 2020 05:54        CAPILLARY BLOOD GLUCOSE      POCT Blood Glucose.: 251 mg/dL (07 Dec 2020 11:52)  POCT Blood Glucose.: 158 mg/dL (07 Dec 2020 08:36)  POCT Blood Glucose.: 321 mg/dL (06 Dec 2020 21:39)  POCT Blood Glucose.: 211 mg/dL (06 Dec 2020 17:03)        Culture - Urine (collected 12-03-20 @ 01:28)  Source: .Urine Catheterized  Final Report (12-04-20 @ 02:36):    >=3 organisms. Probable collection contamination.    Culture - Blood (collected 12-03-20 @ 01:12)  Source: .Blood Blood-Peripheral  Preliminary Report (12-04-20 @ 02:01):    No growth to date.    Culture - Blood (collected 12-03-20 @ 01:12)  Source: .Blood Blood-Peripheral  Gram Stain (12-04-20 @ 09:56):    Growth in anaerobic bottle: Gram Positive Cocci in Clusters  Final Report (12-05-20 @ 12:07):    Growth in anaerobic bottle: Staphylococcus hominis    Coag Negative Staphylococcus    Single set isolate, possible contaminant. Contact    Microbiology if susceptibility testing clinically    indicated.    ***Blood Panel PCR results on this specimen are available    approximately 3 hours after the Gram stain result.***    Gram stain, PCR, and/or culture results may not always    correspond due to difference in methodologies.    ************************************************************    This PCR assay was performed using "[employer]    The following targets are tested for: Enterococcus,    vancomycin resistant enterococci, Listeria monocytogenes,    coagulase negative staphylococci, S. aureus,    methicillin resistant S. aureus, Streptococcus agalactiae    (Group B), S. pneumoniae, S. pyogenes (Group A),    Acinetobacter baumannii, Enterobacter cloacae, E. coli,    Klebsiella oxytoca, K. pneumoniae, Proteus sp.,    Serratia marcescens, Haemophilus influenzae,    Neisseria meningitidis, Pseudomonas aeruginosa, Candida    albicans, C. glabrata, C krusei, C parapsilosis,    C. tropicalis and the KPC resistance gene.    "Due to technical problems, Proteus sp. will Not be reported as part of    the BCID panel until further notice"  Organism: Blood Culture PCR (12-05-20 @ 12:07)  Organism: Blood Culture PCR (12-05-20 @ 12:07)      -  Coagulase negative Staphylococcus: Detec      Method Type: PCR        RADIOLOGY & ADDITIONAL TESTS:    Personally reviewed.     Consultant(s) Notes Reviewed:  [x] YES  [ ] NO

## 2020-12-07 NOTE — PROGRESS NOTE ADULT - SUBJECTIVE AND OBJECTIVE BOX
Patient is a 75y old  Male who presents with a chief complaint of Resp Distress (07 Dec 2020 07:30)    24 hour events: ***    REVIEW OF SYSTEMS  Constitutional: No fever, chills, fatigue  Neuro: No headache, numbness, weakness  Resp: No cough, wheezing, shortness of breath  CVS: No chest pain, palpitations, leg swelling  GI: Stating that he is hungry, begging for food.     T(F): 99.4 (12-07-20 @ 04:22), Max: 99.4 (12-06-20 @ 20:56)  HR: 71 (12-07-20 @ 06:00) (67 - 101)  BP: 104/55 (12-07-20 @ 06:00) (101/58 - 175/75)  RR: 18 (12-07-20 @ 06:00) (17 - 34)  SpO2: 98% (12-07-20 @ 06:00) (93% - 100%)  Wt(kg): --            I&O's Summary    12-06 @ 07:01  -  12-07 @ 07:00  --------------------------------------------------------  IN: 2420 mL / OUT: 3201 mL / NET: -781 mL      PHYSICAL EXAM  Constitutional: Pt in NAD, lethargic, easily awoken   HEENT NC/AT, moist mucous membranes  Pulmonary: CTA b/l  Cardiovascular: +S1, S2, RRR, no murmur  Gastrointestinal: Soft, nontender, nondistended, normoactive bowel sounds  Extremities: BLE edema.   Neurological: AAOx2   Skin: No obvious lesions/rashes    MEDICATIONS  piperacillin/tazobactam IVPB.. IV Intermittent    lisinopril Oral    dextrose 40% Gel Oral  dextrose 50% Injectable IV Push  dextrose 50% Injectable IV Push  dextrose 50% Injectable IV Push  glucagon  Injectable IntraMuscular  insulin lispro (ADMELOG) corrective regimen sliding scale SubCutaneous      oxyCODONE    IR Oral PRN  rOPINIRole Oral      aspirin enteric coated Oral  enoxaparin Injectable SubCutaneous    pantoprazole    Tablet Oral      ascorbic acid Oral  dextrose 5%. IV Continuous  dextrose 5%. IV Continuous  thiamine Oral      chlorhexidine 2% Cloths Topical  nystatin Powder Topical                            7.8    7.01  )-----------( 195      ( 07 Dec 2020 05:54 )             25.7       12-07    141  |  96  |  11  ----------------------------<  130<H>  3.6   |  42<H>  |  0.25<L>    Ca    7.7<L>      07 Dec 2020 05:54  Phos  2.2     12-07  Mg     1.8     12-07    TPro  5.2<L>  /  Alb  2.4<L>  /  TBili  0.7  /  DBili  x   /  AST  20  /  ALT  135<H>  /  AlkPhos  59  12-07              .Urine Catheterized   >=3 organisms. Probable collection contamination. -- 12-03 @ 01:28  .Blood Blood-Peripheral   Growth in anaerobic bottle: Staphylococcus hominis  Coag Negative Staphylococcus  Single set isolate, possible contaminant. Contact  Microbiology if susceptibility testing clinically  indicated.  ***Blood Panel PCR results on this specimen are available  approximately 3 hours after the Gram stain result.***  Gram stain, PCR, and/or culture results may not always  correspond due to difference in methodologies.  ************************************************************  This PCR assay was performed using [employer].  The following targets are tested for: Enterococcus,  vancomycin resistant enterococci, Listeria monocytogenes,  coagulase negative staphylococci, S. aureus,  methicillin resistant S. aureus, Streptococcus agalactiae  (Group B), S. pneumoniae, S. pyogenes (Group A),  Acinetobacter baumannii, Enterobacter cloacae, E. coli,  Klebsiella oxytoca, K. pneumoniae, Proteus sp.,  Serratia marcescens, Haemophilus influenzae,  Neisseria meningitidis, Pseudomonas aeruginosa, Candida  albicans, C. glabrata, C krusei, C parapsilosis,  C. tropicalis and the KPC resistance gene.  "Due to technical problems, Proteus sp. will Not be reported as part of  the BCID panel until further notice"   Growth in anaerobic bottle: Gram Positive Cocci in Clusters 12-03 @ 01:12      Rapid RVP Result: NotDetec (12-02 @ 18:46)    Radiology: ***  Bedside lung ultrasound: ***  Bedside ECHO: ***    ANTONY: ANIYA                   A-LINE: N     GLOBAL ISSUE/BEST PRACTICE  Analgesia: N  Sedation: N  CAM-ICU:   HOB elevation: yes  Stress ulcer prophylaxis:   VTE prophylaxis: Y  Glycemic control: Y  Nutrition: Diabetic diet    CODE STATUS: DNR  GOC discussion: Y         Patient is a 75y old  Male who presents with a chief complaint of Resp Distress (07 Dec 2020 07:30)    24 hour events: Pt seen and doing well this morning. He stating that he was feeling well and had did not have any complaints this morning. He had no overnight events or major changing in status in the last 24 hours.     REVIEW OF SYSTEMS  Constitutional: No fever, chills, fatigue  Neuro: No headache, numbness, weakness  Resp: No cough, wheezing, shortness of breath  CVS: No chest pain, palpitations, leg swelling  GI: Stating that he is hungry, begging for food.     T(F): 99.4 (12-07-20 @ 04:22), Max: 99.4 (12-06-20 @ 20:56)  HR: 71 (12-07-20 @ 06:00) (67 - 101)  BP: 104/55 (12-07-20 @ 06:00) (101/58 - 175/75)  RR: 18 (12-07-20 @ 06:00) (17 - 34)  SpO2: 98% (12-07-20 @ 06:00) (93% - 100%)  Wt(kg): --        I&O's Summary    12-06 @ 07:01  -  12-07 @ 07:00  --------------------------------------------------------  IN: 2420 mL / OUT: 3201 mL / NET: -781 mL      PHYSICAL EXAM  Constitutional: Pt in NAD, awake and alert.   HEENT NC/AT, moist mucous membranes  Pulmonary: CTA b/l  Cardiovascular: +S1, S2, RRR, no murmur  Gastrointestinal: Soft, nontender, nondistended, normoactive bowel sounds  Extremities: Pitting edema of the RUE distal to the elbow.   Neurological: AAOx2   Skin: No obvious lesions/rashes    MEDICATIONS  piperacillin/tazobactam IVPB.. IV Intermittent    lisinopril Oral    dextrose 40% Gel Oral  dextrose 50% Injectable IV Push  dextrose 50% Injectable IV Push  dextrose 50% Injectable IV Push  glucagon  Injectable IntraMuscular  insulin lispro (ADMELOG) corrective regimen sliding scale SubCutaneous      oxyCODONE    IR Oral PRN  rOPINIRole Oral      aspirin enteric coated Oral  enoxaparin Injectable SubCutaneous    pantoprazole    Tablet Oral      ascorbic acid Oral  dextrose 5%. IV Continuous  dextrose 5%. IV Continuous  thiamine Oral      chlorhexidine 2% Cloths Topical  nystatin Powder Topical                            7.8    7.01  )-----------( 195      ( 07 Dec 2020 05:54 )             25.7       12-07    141  |  96  |  11  ----------------------------<  130<H>  3.6   |  42<H>  |  0.25<L>    Ca    7.7<L>      07 Dec 2020 05:54  Phos  2.2     12-07  Mg     1.8     12-07    TPro  5.2<L>  /  Alb  2.4<L>  /  TBili  0.7  /  DBili  x   /  AST  20  /  ALT  135<H>  /  AlkPhos  59  12-07              .Urine Catheterized   >=3 organisms. Probable collection contamination. -- 12-03 @ 01:28  .Blood Blood-Peripheral   Growth in anaerobic bottle: Staphylococcus hominis  Coag Negative Staphylococcus  Single set isolate, possible contaminant. Contact  Microbiology if susceptibility testing clinically  indicated.  ***Blood Panel PCR results on this specimen are available  approximately 3 hours after the Gram stain result.***  Gram stain, PCR, and/or culture results may not always  correspond due to difference in methodologies.  ************************************************************  This PCR assay was performed using [employer].  The following targets are tested for: Enterococcus,  vancomycin resistant enterococci, Listeria monocytogenes,  coagulase negative staphylococci, S. aureus,  methicillin resistant S. aureus, Streptococcus agalactiae  (Group B), S. pneumoniae, S. pyogenes (Group A),  Acinetobacter baumannii, Enterobacter cloacae, E. coli,  Klebsiella oxytoca, K. pneumoniae, Proteus sp.,  Serratia marcescens, Haemophilus influenzae,  Neisseria meningitidis, Pseudomonas aeruginosa, Candida  albicans, C. glabrata, C krusei, C parapsilosis,  C. tropicalis and the KPC resistance gene.  "Due to technical problems, Proteus sp. will Not be reported as part of  the BCID panel until further notice"   Growth in anaerobic bottle: Gram Positive Cocci in Clusters 12-03 @ 01:12      Rapid RVP Result: NotDetec (12-02 @ 18:46)    Radiology: ***  Bedside lung ultrasound: ***  Bedside ECHO: ***    HARDY: Y                   A-LINE: N     GLOBAL ISSUE/BEST PRACTICE  Analgesia: Y  Sedation: N  CAM-ICU:   HOB elevation: yes  Stress ulcer prophylaxis:   VTE prophylaxis: Y  Glycemic control: Y  Nutrition: Diabetic diet    CODE STATUS: DNR  GOC discussion: Y

## 2020-12-07 NOTE — SWALLOW BEDSIDE ASSESSMENT ADULT - SWALLOW EVAL: DIAGNOSIS
Nursing Note by Dionna Gutierres RN at 05/18/17 08:16 AM     Author:  Dionna Gutierres RN Service:  (none) Author Type:  Registered Nurse     Filed:  05/18/17 08:17 AM Encounter Date:  5/18/2017 Status:  Signed     :  Dionna Gutierres RN (Registered Nurse)            Pt states pain is lessening since toradol injection.[MM1.1M]      Revision History        User Key Date/Time User Provider Type Action    > MM1.1 05/18/17 08:17 AM Dionna Gutierres RN Registered Nurse Sign    M - Manual             Pt p/w 1. Mild to moderate oral dysphagia when given regular solids marked by adequate retrieval and containment, prolonged mastication likely 2/2 incomplete dentition, delayed manipulation and transfer, and trace residue on lateral lingual surface (which cleared with liquid wash). 2. Mild oral dysphagia when given puree and thin liquids marked by adequate retrieval and containment, timely manipulation and transfer, suspected posterior loss, and adequate clearance post swallow. 3. Pharyngeal phase marked by suspected timely swallow onset, +laryngeal elevation to palpation, and no overt s/sx of aspiration. Pt's O2 sats remained at baseline high 90s. 4. Recommend solid advancement to mechanical soft solids with continuation of thin liquids. Aspiration precautions. Continue to monitor pulmonary status closely; feed only when on NC or room air. Given CT chest imaging, recommend MBS to r/o silent aspiration. Discussed with ICU PA, who is in agreement with POC.

## 2020-12-07 NOTE — PROGRESS NOTE ADULT - ASSESSMENT
74 yo male with pmhx spinal stenosis, osteomyelitis of lumbar vertebrae, type 2 DM not on insulin, HTN, CAD, TIA, HLD, LISBETH, Iron deficiency anemia, thiamine deficienicy , GERD presents from [facility] admitted for suspected pneumonia now with acute hypoxic hypercapneic resp failure due to bilateral PE, pneumonia and CO2 retention 2/2 LISBETH.    Acute respiratory failure with hypoxia and hypercapnia   - Sent to ICU from the ED 12/3  - Due to bilateral PE, pneumonia and hypercarbia 2/2 LISBETH  - On NC this AM, was on BIPAP overnight  - cards and pulm consulted    Shock, likely cardiogenic  - possible cardiogenic or septic shock given PNA  - Off vasopressors  - f/u blood cultures- 1 of 2 with Staph (?contamin)  - repeat Cx    Metabolic encephalopathy likely due to hypercapnia  - Blood gas reviewed, CO2 improved yesterday but pt noncompliant with bipap use  - Bipap qhs/prn.  - care per ICU  - TSH wnl    Bilateral pulmonary embolism with RV strain  - s/p tPA 12/03, off heparin drip, on therapeutic lovenox  - f/u echo (RV enlarged) and LE dopplers (pending)    Pneumonia, suspect health care associated pneumonia  - Continue IV Zosyn   - f/u legionella  - Neg MRSA testing- now off vancomycin  - Covid negative x 2    R/O ACS (acute coronary syndrome).   - Pt presenting with sob, elevated troponin and abnormal EKG- no prior for comparison, denies chest pain  - Appr cardio consult  - c/w ASA, statin. Off plavix per cardio recs  - monitor on remote tele.     Macrocytic Anemia, likely of chronic disease  - Hgb down trending, rec. stool guaiac negative  - F/u b12, folate    HTN (hypertension)  - continue lisinopril    History of TIA (transient ischemic attack).   - c/w statin, ASA.     Diabetes mellitus.   - hyperglycemic on presentation.   - hold metformin   - start insulin sliding scale, hypoglycemia protocol  - f/u a1c 6%     Spinal stenosis.  - pt with chronic pain, states hes been bedbound x several weeks  - has pain pump he says is no longer in use   - Urine drug screen negative    HLD (hyperlipidemia).   - c/w statin.     GERD (gastroesophageal reflux disease).    - continue protonix, interchange for omeprazole.     Prophylactic measure.   dvt ppx: lovenox bid

## 2020-12-07 NOTE — SWALLOW BEDSIDE ASSESSMENT ADULT - ASR SWALLOW ASPIRATION MONITOR
cough/change of breathing pattern/position upright (90Y)/pneumonia/upper respiratory infection/oral hygiene/gurgly voice/fever/throat clearing
fever/pneumonia/throat clearing/upper respiratory infection/cough/gurgly voice/oral hygiene/change of breathing pattern/position upright (90Y)

## 2020-12-07 NOTE — PROGRESS NOTE ADULT - SUBJECTIVE AND OBJECTIVE BOX
Date/Time Patient Seen:  		  Referring MD:   Data Reviewed	       Patient is a 75y old  Male who presents with a chief complaint of Resp Distress (06 Dec 2020 10:05)      Subjective/HPI     PAST MEDICAL & SURGICAL HISTORY:  Bedbound    H/O osteomyelitis    GERD (gastroesophageal reflux disease)    HLD (hyperlipidemia)    HTN (hypertension)    TIA (transient ischemic attack)    Spinal stenosis    Obesity (BMI 30-39.9)    History of pancreatitis  ~ 1990, 1992    History of ventral hernia    Diabetic peripheral neuropathy    Radial styloid tenosynovitis    Carpal tunnel syndrome on both sides    Spinal stenosis of lumbar region    Anemia    Hyperlipidemia    Hypertension    Type 2 diabetes mellitus    Sleep apnea    History of right knee surgery  &quot; Removal of right knee tumor&quot; ,  1959    History of appendectomy  17 years ago          Medication list         MEDICATIONS  (STANDING):  ascorbic acid 500 milliGRAM(s) Oral daily  aspirin enteric coated 81 milliGRAM(s) Oral daily  chlorhexidine 2% Cloths 1 Application(s) Topical <User Schedule>  dextrose 40% Gel 15 Gram(s) Oral once  dextrose 5%. 1000 milliLiter(s) (50 mL/Hr) IV Continuous <Continuous>  dextrose 5%. 1000 milliLiter(s) (100 mL/Hr) IV Continuous <Continuous>  dextrose 50% Injectable 25 Gram(s) IV Push once  dextrose 50% Injectable 12.5 Gram(s) IV Push once  dextrose 50% Injectable 25 Gram(s) IV Push once  enoxaparin Injectable 70 milliGRAM(s) SubCutaneous every 12 hours  glucagon  Injectable 1 milliGRAM(s) IntraMuscular once  insulin lispro (ADMELOG) corrective regimen sliding scale   SubCutaneous Before meals and at bedtime  lisinopril 5 milliGRAM(s) Oral daily  nystatin Powder 1 Application(s) Topical <User Schedule>  pantoprazole    Tablet 40 milliGRAM(s) Oral before breakfast  piperacillin/tazobactam IVPB.. 3.375 Gram(s) IV Intermittent every 8 hours  rOPINIRole 0.25 milliGRAM(s) Oral two times a day  thiamine 100 milliGRAM(s) Oral daily    MEDICATIONS  (PRN):  oxyCODONE    IR 5 milliGRAM(s) Oral every 6 hours PRN Moderate Pain (4 - 6)         Vitals log        ICU Vital Signs Last 24 Hrs  T(C): 37.4 (07 Dec 2020 04:22), Max: 37.4 (06 Dec 2020 16:05)  T(F): 99.4 (07 Dec 2020 04:22), Max: 99.4 (06 Dec 2020 20:56)  HR: 71 (07 Dec 2020 06:00) (67 - 101)  BP: 104/55 (07 Dec 2020 06:00) (101/58 - 175/75)  BP(mean): 77 (07 Dec 2020 06:00) (74 - 112)  ABP: --  ABP(mean): --  RR: 18 (07 Dec 2020 06:00) (17 - 34)  SpO2: 98% (07 Dec 2020 06:00) (93% - 100%)           Input and Output:  I&O's Detail    06 Dec 2020 07:01  -  07 Dec 2020 07:00  --------------------------------------------------------  IN:    IV PiggyBack: 500 mL    IV PiggyBack: 200 mL    IV PiggyBack: 100 mL    Oral Fluid: 1620 mL  Total IN: 2420 mL    OUT:    Incontinent per Condom Catheter (mL): 1 mL    Voided (mL): 3200 mL  Total OUT: 3201 mL    Total NET: -781 mL          Lab Data                        7.8    7.01  )-----------( 195      ( 07 Dec 2020 05:54 )             25.7     12-07    141  |  96  |  11  ----------------------------<  130<H>  3.6   |  42<H>  |  0.25<L>    Ca    7.7<L>      07 Dec 2020 05:54  Phos  2.2     12-07  Mg     1.8     12-07    TPro  5.2<L>  /  Alb  2.4<L>  /  TBili  0.7  /  DBili  x   /  AST  20  /  ALT  135<H>  /  AlkPhos  59  12-07    ABG - ( 05 Dec 2020 12:22 )  pH, Arterial: 7.26  pH, Blood: x     /  pCO2: 94    /  pO2: 69    / HCO3: 36    / Base Excess: 13.4  /  SaO2: 94                      Review of Systems	      Objective     Physical Examination    heart s1s2  lung dec BS  abd soft  on o2 support      Pertinent Lab findings & Imaging      Daron:  NO   Adequate UO     I&O's Detail    06 Dec 2020 07:01  -  07 Dec 2020 07:00  --------------------------------------------------------  IN:    IV PiggyBack: 500 mL    IV PiggyBack: 200 mL    IV PiggyBack: 100 mL    Oral Fluid: 1620 mL  Total IN: 2420 mL    OUT:    Incontinent per Condom Catheter (mL): 1 mL    Voided (mL): 3200 mL  Total OUT: 3201 mL    Total NET: -781 mL               Discussed with:     Cultures:	        Radiology

## 2020-12-07 NOTE — PROGRESS NOTE ADULT - ASSESSMENT
76 yo m pmh spinal stenosis, osteomyelitis of lumbar vertebrae, type 2 DM not on insulin, HTN, CAD, TIA, HLD, LISBETH, Iron deficiency anemia, thiamine deficienicy, GERD presents from [facility] admit for suspected pneumonia.    Pt initially presented with SOB and possible PNA. Initial CXR with bibasilar atelectasis/airspace disease and small right pleural effusion. Pt with worsening SOB rapid response called for hypoxia and hypotension. Pt also found to be tachycardic. POCUS with bedside ultrasound during rapid response showed dilated RV, hyperdynamic LV function. Pt placed on NRB, admitting to chest pain and SOB. Pt admitted to ICU. Current EKG with S1Q3T3 consistent with PE. CTA shows Bilateral pulmonary emboli with CT findings suggesting right heart strain and small-to-moderate right pleural effusion. tPA given.  Bedside echo with signs of RV failure  Pressors now off    Recommendations   - Maintaining hemodynamic stability  - Troponin trend was not consistent with ACS.  Rather, it seems consistent with RV failure in the setting of massive PE with hemodynamic collapse  - echo showed RV dilation with decreased RV function. Limited echo with no mass associated with tricuspid valve  - Holding plavix 75 qd  - Continue aspirin 81  - s/p TPA  - cont Lovenox  - resume statin when able  - Monitor hemodynamics closely. Patient at risk for abrupt decompensation  - Respiratory support with BIPAP as needed.     - Monitor and replete lytes, keep K>4, Mg>2.  - Other cardiovascular workup will depend on clinical course.    - All other workup per ICU team.   - cautious transfer to floor. The patient remains at risk of abrupt decompensation on the basis of resp insuff and massive pe

## 2020-12-07 NOTE — SWALLOW BEDSIDE ASSESSMENT ADULT - COMMENTS
Consult received and chart reviewed. Pt is s/p RRT this AM 12/3 for hypoxia and hypotension. Pt transferred to the ICU. Pt placed on non-rebreather as supplemental means of O2. Per discussion with ICU PA, pt is not clinically appropriate for clinical swallow assessment at this time. Consider short-term non-oral means of nutrition/hydration/medication while pt is on non-rebreather to reduce risk of aspiration and ensure pt is meeting adequate daily caloric needs. Team to reconsult this department when pt is able to tolerate NC for at least 2 hours and when deemed clinically appropriate.
Initial clinical swallow assessment completed 12/4, at which time pt was recommended soft solids with thin liquids. Follow up session was completed 12/5, at which time pt was downgraded to puree with thin liquids due to increased lethargy. Team requesting swallow reassessment for potential diet advancement given improvement in mental status.  CT chest 12/3 revealed: "Bilateral pulmonary emboli with CT findings suggesting right heart strain. The right lower lobe is nonaerated likely representative of a combination of atelectasis and pneumonia. Small-to-moderate right pleural effusion." Pt's WBC is WFL, no fever.    Upon arrival, pt sleeping in bed. Pt eventually arousable to verbal/tactile cues. Pt was awake throughout session, but somewhat lethargic. Pt agreeable to assessment. Pt on supplemental O2 via NC with O2 sats trending 99%-10%. Pt follow low level directives with verbal prompts. Pt's vocal quality/intensity was grossly WFL. Pt denied pain pre and post assessment.
Consult received and chart reviewed. The patient was seen at bedside this AM for an initial assessment of swallow function, at which time he was alert and cooperative. Patient currently receiving supplemental O2 via 5L NC in place. SpO2 remained at 100% and RR at 20 throughout all PO trials. Per RN report, the patient consumed 100% of his breakfast this AM without any evidence of swallowing difficulties. Patient reporting diffuse body pain, which RN made aware of.    Per charting, the patient is a "76 yo male with pmhx spinal stenosis, osteomyelitis of lumbar vertebrae, type 2 DM not on insulin, HTN, CAD, TIA, HLD, LISBETH, Iron deficiency anemia, thiamine deficienicy , GERD presents from [facility] admitted for suspected pneumonia now with acute hypoxic hypercapneic resp failure due to bilateral PE, pneumonia and CO2 retention 2/2 LISBETH."    WBC is elevated. Most recent CT angio of the chest revealed, " Bilateral pulmonary emboli with CT findings suggesting right heart strain. The right lower lobe is nonaerated likely representative of a combination of atelectasis and pneumonia. Small-to-moderate right pleural effusion. Additional findings discussed above."    Discussed results and recommendations from this evaluation with the patient, RN, and ICU MD.

## 2020-12-07 NOTE — PROGRESS NOTE ADULT - PROBLEM SELECTOR PLAN 1
on o2 support - NC - awake - alert - verbal -   am serum co2 reviewed - elevated - caution with Opioids -   use of NIPPV - nightly and PRN day time -   76 y/o male with hx HTN, HLD, DM2, CAD, LISBETH, chronic pain syndrome with intrathecal and abdominal pumps (inactive)  s/p TLC - for acute PE with RV strain - on lovenox now for PE -   on emp ABX for sepsis - eval for LRTI - planned until 12 7 2020   I and O -   monitor HD and VS - tapered off pressors -   keep sat > 88 pct  cardio f/u noted - TTE reviewed -.

## 2020-12-07 NOTE — SWALLOW BEDSIDE ASSESSMENT ADULT - ASR SWALLOW RECOMMEND DIAG
VFSS/MBS/given CT chest imaging
Consider an MBSS if there is a concern for silent aspiration given CT angio of the chest results./VFSS/MBS

## 2020-12-07 NOTE — SWALLOW BEDSIDE ASSESSMENT ADULT - SWALLOW EVAL: RECOMMENDED DIET
mechanical soft solids (dysphagia 2) with thin liquids +aspiration precautions
Soft solids with thin liquids, as tolerated

## 2020-12-07 NOTE — PROGRESS NOTE ADULT - SUBJECTIVE AND OBJECTIVE BOX
[facility]    Stacey Du, Luis Fernando, Bronwyn, Evangelist, Deep, Sivakumar      [phone]    CHIEF COMPLAINT: Patient is a 75y old  Male who presents with a chief complaint of Resp Distress (07 Dec 2020 07:38)      Follow Up: massive pe    Interim history: The patient reports no new symptoms.  Denies chest discomfort and shortness of breath.  No abdominal pain.  No new neurologic symptoms.      MEDICATIONS  (STANDING):  ascorbic acid 500 milliGRAM(s) Oral daily  aspirin enteric coated 81 milliGRAM(s) Oral daily  chlorhexidine 2% Cloths 1 Application(s) Topical <User Schedule>  dextrose 40% Gel 15 Gram(s) Oral once  dextrose 5%. 1000 milliLiter(s) (50 mL/Hr) IV Continuous <Continuous>  dextrose 5%. 1000 milliLiter(s) (100 mL/Hr) IV Continuous <Continuous>  dextrose 50% Injectable 25 Gram(s) IV Push once  dextrose 50% Injectable 25 Gram(s) IV Push once  dextrose 50% Injectable 12.5 Gram(s) IV Push once  enoxaparin Injectable 70 milliGRAM(s) SubCutaneous every 12 hours  glucagon  Injectable 1 milliGRAM(s) IntraMuscular once  insulin lispro (ADMELOG) corrective regimen sliding scale   SubCutaneous Before meals and at bedtime  lisinopril 5 milliGRAM(s) Oral daily  nystatin Powder 1 Application(s) Topical <User Schedule>  pantoprazole    Tablet 40 milliGRAM(s) Oral before breakfast  piperacillin/tazobactam IVPB.. 3.375 Gram(s) IV Intermittent every 8 hours  rOPINIRole 0.25 milliGRAM(s) Oral two times a day  thiamine 100 milliGRAM(s) Oral daily    MEDICATIONS  (PRN):  oxyCODONE    IR 5 milliGRAM(s) Oral every 6 hours PRN Moderate Pain (4 - 6)      REVIEW OF SYSTEMS:  eye, ent, GI, , allergic, dermatologic, musculoskeletal and neurologic are negative except as described above    Vital Signs Last 24 Hrs  T(C): 36.9 (07 Dec 2020 08:01), Max: 37.4 (06 Dec 2020 16:05)  T(F): 98.4 (07 Dec 2020 08:01), Max: 99.4 (06 Dec 2020 20:56)  HR: 80 (07 Dec 2020 11:00) (67 - 101)  BP: 115/57 (07 Dec 2020 11:00) (101/58 - 152/77)  BP(mean): 82 (07 Dec 2020 11:00) (74 - 108)  RR: 17 (07 Dec 2020 11:00) (16 - 34)  SpO2: 98% (07 Dec 2020 11:00) (93% - 100%)    I&O's Summary    06 Dec 2020 07:01  -  07 Dec 2020 07:00  --------------------------------------------------------  IN: 2420 mL / OUT: 3201 mL / NET: -781 mL        Telemetry past 24h: sb    PHYSICAL EXAM:    Constitutional: well-nourished, well-developed, NAD   HEENT:  MMM, sclerae anicteric, conjunctivae clear, no oral cyanosis.  Pulmonary: Non-labored, breath sounds are clear bilaterally, No wheezing, rales or rhonchi  Cardiovascular: Regular, S1 and S2.  No murmur.  No rubs, gallops or clicks  Gastrointestinal: Bowel Sounds present, soft, nontender.   Lymph: No peripheral edema.   Neurological: Alert, no focal deficits  Skin: No rashes.  Psych:  Mood & affect appropriate    LABS: All Labs Reviewed:                        7.8    7.01  )-----------( 195      ( 07 Dec 2020 05:54 )             25.7                         7.8    7.92  )-----------( 173      ( 06 Dec 2020 06:31 )             26.2                         8.3    9.83  )-----------( 196      ( 05 Dec 2020 05:52 )             27.4     07 Dec 2020 05:54    141    |  96     |  11     ----------------------------<  130    3.6     |  42     |  0.25   06 Dec 2020 06:31    141    |  97     |  16     ----------------------------<  119    3.6     |  40     |  0.23   05 Dec 2020 16:45    141    |  100    |  24     ----------------------------<  64     4.1     |  36     |  0.30     Ca    7.7        07 Dec 2020 05:54  Ca    7.9        06 Dec 2020 06:31  Ca    8.2        05 Dec 2020 16:45  Phos  2.2       07 Dec 2020 05:54  Phos  1.2       06 Dec 2020 06:31  Phos  2.4       05 Dec 2020 16:45  Mg     1.8       07 Dec 2020 05:54  Mg     1.9       06 Dec 2020 06:31  Mg     2.0       05 Dec 2020 16:45    TPro  5.2    /  Alb  2.4    /  TBili  0.7    /  DBili  x      /  AST  20     /  ALT  135    /  AlkPhos  59     07 Dec 2020 05:54  TPro  5.2    /  Alb  2.5    /  TBili  0.8    /  DBili  .40    /  AST  34     /  ALT  190    /  AlkPhos  62     06 Dec 2020 06:31          Blood Culture: Organism --  Gram Stain Blood -- Gram Stain --  Specimen Source .Urine Catheterized  Culture-Blood --    Organism Blood Culture PCR  Gram Stain Blood -- Gram Stain   Growth in anaerobic bottle: Gram Positive Cocci in Clusters  Specimen Source .Blood Blood-Peripheral  Culture-Blood --            RADIOLOGY:    EKG:    Echo:

## 2020-12-08 NOTE — PROGRESS NOTE ADULT - NSHPATTENDINGPLANDISCUSS_GEN_ALL_CORE
patient
patient, ICU.
patient, RN.
pt, icu nurse
pts daughter, ICU, ED RN
ICU staff
ICU staff
ICU staff, cardiology, medicine

## 2020-12-08 NOTE — PROGRESS NOTE ADULT - SUBJECTIVE AND OBJECTIVE BOX
[facility] -- Stacey Du, Luis Fernando, Bronwyn, Deep Blanca Savella  Office # [phone]      Follow Up:    Massive PE  Subjective/Observations:   No events overnight resting comfortably in bed.  No complaints of chest pain, dyspnea, or palpitations reported. No signs of orthopnea or PND. Wearing nasal cannula     REVIEW OF SYSTEMS: All other review of systems is negative unless indicated above    PAST MEDICAL & SURGICAL HISTORY:  Bedbound    H/O osteomyelitis    GERD (gastroesophageal reflux disease)    HLD (hyperlipidemia)    HTN (hypertension)    TIA (transient ischemic attack)    Spinal stenosis    Obesity (BMI 30-39.9)    History of pancreatitis  ~ 1990, 1992    History of ventral hernia     Diabetic peripheral neuropathy    Radial styloid tenosynovitis    Carpal tunnel syndrome on both sides    Spinal stenosis of lumbar region    Anemia    Hyperlipidemia    Hypertension    Type 2 diabetes mellitus    Sleep apnea    History of right knee surgery  &quot; Removal of right knee tumor&quot; ,  1959    History of appendectomy  17 years ago        MEDICATIONS  (STANDING):  ascorbic acid 500 milliGRAM(s) Oral daily  aspirin enteric coated 81 milliGRAM(s) Oral daily  chlorhexidine 2% Cloths 1 Application(s) Topical <User Schedule>  dextrose 40% Gel 15 Gram(s) Oral once  dextrose 5%. 1000 milliLiter(s) (50 mL/Hr) IV Continuous <Continuous>  dextrose 5%. 1000 milliLiter(s) (100 mL/Hr) IV Continuous <Continuous>  dextrose 50% Injectable 25 Gram(s) IV Push once  dextrose 50% Injectable 12.5 Gram(s) IV Push once  dextrose 50% Injectable 25 Gram(s) IV Push once  enoxaparin Injectable 70 milliGRAM(s) SubCutaneous every 12 hours  glucagon  Injectable 1 milliGRAM(s) IntraMuscular once  insulin lispro (ADMELOG) corrective regimen sliding scale   SubCutaneous Before meals and at bedtime  lisinopril 5 milliGRAM(s) Oral daily  nystatin Powder 1 Application(s) Topical <User Schedule>  pantoprazole    Tablet 40 milliGRAM(s) Oral before breakfast  rOPINIRole 0.25 milliGRAM(s) Oral two times a day  thiamine 100 milliGRAM(s) Oral daily    MEDICATIONS  (PRN):  oxyCODONE    IR 5 milliGRAM(s) Oral every 6 hours PRN Moderate Pain (4 - 6)      Allergies    No Known Allergies    Intolerances        Vital Signs Last 24 Hrs  T(C): 37 (08 Dec 2020 07:40), Max: 37.3 (07 Dec 2020 15:31)  T(F): 98.6 (08 Dec 2020 07:40), Max: 99.1 (07 Dec 2020 15:31)  HR: 79 (08 Dec 2020 08:51) (72 - 103)  BP: 123/64 (08 Dec 2020 07:40) (105/62 - 161/84)  BP(mean): 82 (07 Dec 2020 11:00) (82 - 82)  RR: 19 (08 Dec 2020 07:40) (17 - 19)  SpO2: 97% (08 Dec 2020 08:51) (95% - 98%)    I&O's Summary    07 Dec 2020 07:01  -  08 Dec 2020 07:00  --------------------------------------------------------  IN: 0 mL / OUT: 200 mL / NET: -200 mL          PHYSICAL EXAM:  TELE:   Constitutional: NAD, awake and alert, Frail   HEENT: Moist Mucous Membranes, Anicteric  Pulmonary: Non-labored, breath sounds with Bilaterally diminished at bases  No  wheezes, crackles or rhonchi   Cardiovascular: Regular, S1 and S2 nl, + murmur No rubs, gallops or clicks   Gastrointestinal: Bowel Sounds present, soft, nontender.   Lymph: No lymphadenopathy. No peripheral edema.  Skin: No visible rashes or ulcers.  Psych:  Mood & affect appropriate    LABS: All Labs Reviewed:                        7.7    6.56  )-----------( 189      ( 08 Dec 2020 08:13 )             26.5                         7.8    7.01  )-----------( 195      ( 07 Dec 2020 05:54 )             25.7                         7.8    7.92  )-----------( 173      ( 06 Dec 2020 06:31 )             26.2     08 Dec 2020 08:13    141    |  99     |  10     ----------------------------<  135    4.6     |  40     |  0.29   07 Dec 2020 05:54    141    |  96     |  11     ----------------------------<  130    3.6     |  42     |  0.25   06 Dec 2020 06:31    141    |  97     |  16     ----------------------------<  119    3.6     |  40     |  0.23     Ca    8.1        08 Dec 2020 08:13  Ca    7.7        07 Dec 2020 05:54  Ca    7.9        06 Dec 2020 06:31  Phos  2.4       08 Dec 2020 08:13  Phos  2.2       07 Dec 2020 05:54  Phos  1.2       06 Dec 2020 06:31  Mg     1.9       08 Dec 2020 08:13  Mg     1.8       07 Dec 2020 05:54  Mg     1.9       06 Dec 2020 06:31    TPro  5.3    /  Alb  2.4    /  TBili  0.6    /  DBili  x      /  AST  16     /  ALT  95     /  AlkPhos  60     08 Dec 2020 08:13  TPro  5.2    /  Alb  2.4    /  TBili  0.7    /  DBili  x      /  AST  20     /  ALT  135    /  AlkPhos  59     07 Dec 2020 05:54  TPro  5.2    /  Alb  2.5    /  TBili  0.8    /  DBili  .40    /  AST  34     /  ALT  190    /  AlkPhos  62     06 Dec 2020 06:31

## 2020-12-08 NOTE — PROGRESS NOTE ADULT - ASSESSMENT
74 yo male with pmhx spinal stenosis, osteomyelitis of lumbar vertebrae, type 2 DM not on insulin, HTN, CAD, TIA, HLD, LISBETH, Iron deficiency anemia, thiamine deficiency , GERD presents [facility] admitted for pneumonia now with acute hypoxic hypercapneic resp failure due to bilateral PE, pneumonia and CO2 retention 2/2 LISBETH with NIPPV non compliance.    Acute respiratory failure with hypoxia and hypercapnia   - Sent to ICU from the ED 12/3. Downgraded to tele on 12/7 PM.  - Due to bilateral PE, pneumonia and hypercarbia 2/2 LISBETH  - BIPAP QHS, PRN, Nasal canula   - Cards and pulm consulted    Shock, likely cardiogenic  - possible cardiogenic or septic shock given PNA  - Off vasopressors  - f/u blood cultures- 1 of 2 with Staph (?contamin)  - Repeat blood cultures ordered    Metabolic encephalopathy likely due to hypercapnia  - Encephalopathy resolved.   - Bipap qhs/prn. Pt non compliant overnight. Discussed with patient, explained risks of non compliance including resp failure and death. Pt verbalized understanding, states he will try to comply tonight.   - TSH wnl    Bilateral pulmonary embolism with RV strain  - s/p tPA 12/03, s/p eparin drip, now herapeutic lovenox  - f/u echo (RV enlarged) and LE dopplers (pending)  - Right arm edema; likely fluid, mild improved with elevation - advised RN to keep arm elevated - pt on therapeutic lovenox.     Pneumonia, suspect health care associated pneumonia  - IV Zosyn 12/2 to 12/7   - MRSA neg, Legionella neg, Staph aureus +  - Covid negative 12/2 and negative 12/6. COVID antibody neg.    R/O ACS (acute coronary syndrome).   - Pt presenting with sob, elevated troponin and abnormal EKG- no prior for comparison, denies chest pain  - Appr cardio consult  - c/w ASA, statin. Off plavix per cardio recs  - monitor on tele.     Macrocytic Anemia, likely of chronic disease  - Hgb down trending, stool guaiac negative (repeat ordered)  - Ddx broad incluidng anemia of chronic disease, due to phlebotomy  - F/u b12, folate    HTN (hypertension)  - continue lisinopril 5mg daily, /64    History of TIA (transient ischemic attack).   - c/w statin, ASA.     Diabetes mellitus.   - hyperglycemic on presentation.   - hold metformin   - start insulin sliding scale, hypoglycemia protocol  - A1c 6%     Spinal stenosis.  - pt with chronic pain, states hes been bedbound x several weeks  - has pain pump he says is no longer in use   - Urine drug screen negative  - Oxycodone 5mg PRN; not requiring a lot, used once in last 2-3 days.     HLD (hyperlipidemia).   - c/w statin.     GERD (gastroesophageal reflux disease).    - continue protonix, interchange for omeprazole.     Prophylactic measure.   dvt ppx: lovenox bid    Disposition:  - downgraded from ICU last night, now on tele  - PT eval 12/4: rec RAMILA, will discuss with CM today.

## 2020-12-08 NOTE — PROGRESS NOTE ADULT - ASSESSMENT
76 y/o M  PMH HTN, Hld, TIA, spinal stenosis, DM2, Anemia, bed bound PW SOB S/P rapid response called for hypoxia and hypotension.  POCUS with bedside ultrasound during rapid response showed dilated RV, hyperdynamic LV function. Pt admitted to ICU. Current EKG with S1Q3T3 consistent with PE. CTA shows Bilateral pulmonary emboli with CT findings suggesting right heart strain and small-to-moderate right pleural effusion. tPA given.  Bedside echo with signs of RV failure  Pressors now off      PE  -EKG with S1Q3T3 consistent with PE. CTA shows Bilateral pulmonary emboli with CT findings suggesting right heart strain and small-to-moderate right pleural effusion. tPA given.  Bedside echo with signs of RV failure  Pressors now off  - Maintaining hemodynamic stability  - s/p TPA  - cont Lovenox  - Respiratory support with BIPAP as needed.   -FU pulm recs  -per primary team     Elevated troponins   - Troponin trend was not consistent with ACS.  Rather, it seems consistent with RV failure in the setting of massive PE with hemodynamic collapse  - echo showed RV dilation with decreased RV function. Limited echo with no mass associated with tricuspid valve  - Holding plavix 75 qd  - Continue aspirin 81    HTN  -CW lisnopril    Hld  - resume statin when able      - Monitor hemodynamics closely. Patient at risk for abrupt decompensation  -Monitor and replete lytes, keep K>4 and Mg >2  - Further cardiac workup will depend on clinical course.   - All other workup per primary team  Thank you for the consult  Will continue to follow  Florentin Fabian DNP, ANP-c  Cardiology   Spectra #[phone]  [phone]

## 2020-12-08 NOTE — PROGRESS NOTE ADULT - SUBJECTIVE AND OBJECTIVE BOX
Patient is a 75y old  Male who presents with a chief complaint of Resp Distress (08 Dec 2020 08:20)      INTERVAL HPI/OVERNIGHT EVENTS: Patient seen and examined at bedside this morning. States he was non compliant with his bipap overnight, "i used it for some time". Per nurse pt had on bipap from 2 to 4am. Was encouraged to use it but declined.     MEDICATIONS  (STANDING):  ascorbic acid 500 milliGRAM(s) Oral daily  aspirin enteric coated 81 milliGRAM(s) Oral daily  chlorhexidine 2% Cloths 1 Application(s) Topical <User Schedule>  dextrose 40% Gel 15 Gram(s) Oral once  dextrose 5%. 1000 milliLiter(s) (50 mL/Hr) IV Continuous <Continuous>  dextrose 5%. 1000 milliLiter(s) (100 mL/Hr) IV Continuous <Continuous>  dextrose 50% Injectable 25 Gram(s) IV Push once  dextrose 50% Injectable 12.5 Gram(s) IV Push once  dextrose 50% Injectable 25 Gram(s) IV Push once  enoxaparin Injectable 70 milliGRAM(s) SubCutaneous every 12 hours  glucagon  Injectable 1 milliGRAM(s) IntraMuscular once  insulin lispro (ADMELOG) corrective regimen sliding scale   SubCutaneous Before meals and at bedtime  lisinopril 5 milliGRAM(s) Oral daily  nystatin Powder 1 Application(s) Topical <User Schedule>  pantoprazole    Tablet 40 milliGRAM(s) Oral before breakfast  rOPINIRole 0.25 milliGRAM(s) Oral two times a day  thiamine 100 milliGRAM(s) Oral daily    MEDICATIONS  (PRN):  oxyCODONE    IR 5 milliGRAM(s) Oral every 6 hours PRN Moderate Pain (4 - 6)      Allergies    No Known Allergies    Intolerances        REVIEW OF SYSTEMS:  CONSTITUTIONAL: No fever or chills, +fatigue  HEENT:  No headache, no sore throat  RESPIRATORY: No cough, wheezing, or shortness of breath  CARDIOVASCULAR: No chest pain, palpitations  GASTROINTESTINAL: No abd pain, nausea, vomiting, or diarrhea  GENITOURINARY: No dysuria, frequency, or hematuria  NEUROLOGICAL: no focal weakness or dizziness  MUSCULOSKELETAL: no myalgias     Vital Signs Last 24 Hrs  T(C): 37 (08 Dec 2020 07:40), Max: 37.3 (07 Dec 2020 15:31)  T(F): 98.6 (08 Dec 2020 07:40), Max: 99.1 (07 Dec 2020 15:31)  HR: 79 (08 Dec 2020 08:51) (72 - 103)  BP: 123/64 (08 Dec 2020 07:40) (105/62 - 161/84)  BP(mean): 82 (07 Dec 2020 11:00) (78 - 82)  RR: 19 (08 Dec 2020 07:40) (16 - 28)  SpO2: 97% (08 Dec 2020 08:51) (95% - 100%)    PHYSICAL EXAM:  GENERAL: NAD  HEENT:  anicteric, moist mucous membranes  CHEST/LUNG: good air entry b/L, on nasal canula  HEART:  RRR, S1, S2  ABDOMEN:  BS+, soft, nontender, nondistended  EXTREMITIES: +UE and LE edema (right UE edema, 2+ pulse, warm and pink extremities)  NERVOUS SYSTEM: answers questions and follows commands appropriately    LABS:                        7.7    6.56  )-----------( 189      ( 08 Dec 2020 08:13 )             26.5     CBC Full  -  ( 08 Dec 2020 08:13 )  WBC Count : 6.56 K/uL  Hemoglobin : 7.7 g/dL  Hematocrit : 26.5 %  Platelet Count - Automated : 189 K/uL  Mean Cell Volume : 106.9 fl  Mean Cell Hemoglobin : 31.0 pg  Mean Cell Hemoglobin Concentration : 29.1 gm/dL  Auto Neutrophil # : 4.62 K/uL  Auto Lymphocyte # : 0.80 K/uL  Auto Monocyte # : 0.83 K/uL  Auto Eosinophil # : 0.27 K/uL  Auto Basophil # : 0.02 K/uL  Auto Neutrophil % : 70.4 %  Auto Lymphocyte % : 12.2 %  Auto Monocyte % : 12.7 %  Auto Eosinophil % : 4.1 %  Auto Basophil % : 0.3 %    08 Dec 2020 08:13    141    |  99     |  10     ----------------------------<  135    4.6     |  40     |  0.29     Ca    8.1        08 Dec 2020 08:13  Phos  2.4       08 Dec 2020 08:13  Mg     1.9       08 Dec 2020 08:13    TPro  5.3    /  Alb  2.4    /  TBili  0.6    /  DBili  x      /  AST  16     /  ALT  95     /  AlkPhos  60     08 Dec 2020 08:13        CAPILLARY BLOOD GLUCOSE      POCT Blood Glucose.: 145 mg/dL (08 Dec 2020 08:05)  POCT Blood Glucose.: 223 mg/dL (07 Dec 2020 21:45)  POCT Blood Glucose.: 201 mg/dL (07 Dec 2020 16:37)  POCT Blood Glucose.: 251 mg/dL (07 Dec 2020 11:52)        Culture - Urine (collected 12-03-20 @ 01:28)  Source: .Urine Catheterized  Final Report (12-04-20 @ 02:36):    >=3 organisms. Probable collection contamination.    Culture - Blood (collected 12-03-20 @ 01:12)  Source: .Blood Blood-Peripheral  Final Report (12-08-20 @ 02:00):    No Growth Final    Culture - Blood (collected 12-03-20 @ 01:12)  Source: .Blood Blood-Peripheral  Gram Stain (12-04-20 @ 09:56):    Growth in anaerobic bottle: Gram Positive Cocci in Clusters  Final Report (12-05-20 @ 12:07):    Growth in anaerobic bottle: Staphylococcus hominis    Coag Negative Staphylococcus    Single set isolate, possible contaminant. Contact    Microbiology if susceptibility testing clinically    indicated.    ***Blood Panel PCR results on this specimen are available    approximately 3 hours after the Gram stain result.***    Gram stain, PCR, and/or culture results may not always    correspond due to difference in methodologies.    ************************************************************    This PCR assay was performed using [employer].    The following targets are tested for: Enterococcus,    vancomycin resistant enterococci, Listeria monocytogenes,    coagulase negative staphylococci, S. aureus,    methicillin resistant S. aureus, Streptococcus agalactiae    (Group B), S. pneumoniae, S. pyogenes (Group A),    Acinetobacter baumannii, Enterobacter cloacae, E. coli,    Klebsiella oxytoca, K. pneumoniae, Proteus sp.,    Serratia marcescens, Haemophilus influenzae,    Neisseria meningitidis, Pseudomonas aeruginosa, Candida    albicans, C. glabrata, C krusei, C parapsilosis,    C. tropicalis and the KPC resistance gene.    "Due to technical problems, Proteus sp. will Not be reported as part of    the BCID panel until further notice"  Organism: Blood Culture PCR (12-05-20 @ 12:07)  Organism: Blood Culture PCR (12-05-20 @ 12:07)      -  Coagulase negative Staphylococcus: Detec      Method Type: PCR        RADIOLOGY & ADDITIONAL TESTS:    Personally reviewed.     Consultant(s) Notes Reviewed:  [x] YES  [ ] NO

## 2020-12-08 NOTE — PROGRESS NOTE ADULT - SUBJECTIVE AND OBJECTIVE BOX
Date/Time Patient Seen:  		  Referring MD:   Data Reviewed	       Patient is a 75y old  Male who presents with a chief complaint of Resp Distress (07 Dec 2020 16:27)      Subjective/HPI     PAST MEDICAL & SURGICAL HISTORY:  Bedbound    H/O osteomyelitis    GERD (gastroesophageal reflux disease)    HLD (hyperlipidemia)    HTN (hypertension)    TIA (transient ischemic attack)    Spinal stenosis    Obesity (BMI 30-39.9)    History of pancreatitis  ~ 1990, 1992    History of ventral hernia    Diabetic peripheral neuropathy    Radial styloid tenosynovitis    Carpal tunnel syndrome on both sides    Spinal stenosis of lumbar region    Anemia    Hyperlipidemia    Hypertension    Type 2 diabetes mellitus    Sleep apnea    History of right knee surgery  &quot; Removal of right knee tumor&quot; ,  1959    History of appendectomy  17 years ago          Medication list         MEDICATIONS  (STANDING):  ascorbic acid 500 milliGRAM(s) Oral daily  aspirin enteric coated 81 milliGRAM(s) Oral daily  chlorhexidine 2% Cloths 1 Application(s) Topical <User Schedule>  dextrose 40% Gel 15 Gram(s) Oral once  dextrose 5%. 1000 milliLiter(s) (50 mL/Hr) IV Continuous <Continuous>  dextrose 5%. 1000 milliLiter(s) (100 mL/Hr) IV Continuous <Continuous>  dextrose 50% Injectable 25 Gram(s) IV Push once  dextrose 50% Injectable 12.5 Gram(s) IV Push once  dextrose 50% Injectable 25 Gram(s) IV Push once  enoxaparin Injectable 70 milliGRAM(s) SubCutaneous every 12 hours  glucagon  Injectable 1 milliGRAM(s) IntraMuscular once  insulin lispro (ADMELOG) corrective regimen sliding scale   SubCutaneous Before meals and at bedtime  lisinopril 5 milliGRAM(s) Oral daily  nystatin Powder 1 Application(s) Topical <User Schedule>  pantoprazole    Tablet 40 milliGRAM(s) Oral before breakfast  rOPINIRole 0.25 milliGRAM(s) Oral two times a day  thiamine 100 milliGRAM(s) Oral daily    MEDICATIONS  (PRN):  oxyCODONE    IR 5 milliGRAM(s) Oral every 6 hours PRN Moderate Pain (4 - 6)         Vitals log        ICU Vital Signs Last 24 Hrs  T(C): 37 (08 Dec 2020 07:40), Max: 37.3 (07 Dec 2020 15:31)  T(F): 98.6 (08 Dec 2020 07:40), Max: 99.1 (07 Dec 2020 15:31)  HR: 80 (08 Dec 2020 07:40) (72 - 103)  BP: 123/64 (08 Dec 2020 07:40) (105/62 - 161/84)  BP(mean): 82 (07 Dec 2020 11:00) (78 - 82)  ABP: --  ABP(mean): --  RR: 19 (08 Dec 2020 07:40) (16 - 28)  SpO2: 97% (08 Dec 2020 07:40) (95% - 100%)           Input and Output:  I&O's Detail    07 Dec 2020 07:01  -  08 Dec 2020 07:00  --------------------------------------------------------  IN:  Total IN: 0 mL    OUT:    Incontinent per Condom Catheter (mL): 200 mL  Total OUT: 200 mL    Total NET: -200 mL          Lab Data                        7.8    7.01  )-----------( 195      ( 07 Dec 2020 05:54 )             25.7     12-07    141  |  96  |  11  ----------------------------<  130<H>  3.6   |  42<H>  |  0.25<L>    Ca    7.7<L>      07 Dec 2020 05:54  Phos  2.2     12-07  Mg     1.8     12-07    TPro  5.2<L>  /  Alb  2.4<L>  /  TBili  0.7  /  DBili  x   /  AST  20  /  ALT  135<H>  /  AlkPhos  59  12-07            Review of Systems	      Objective     Physical Examination    heart s1s2  lung dec BS  abd soft      Pertinent Lab findings & Imaging      Neri:  NO   Adequate UO     I&O's Detail    07 Dec 2020 07:01  -  08 Dec 2020 07:00  --------------------------------------------------------  IN:  Total IN: 0 mL    OUT:    Incontinent per Condom Catheter (mL): 200 mL  Total OUT: 200 mL    Total NET: -200 mL               Discussed with:     Cultures:	        Radiology

## 2020-12-08 NOTE — PROGRESS NOTE ADULT - PROBLEM SELECTOR PLAN 1
Poorly compliant with NIPPV overnight - on o2 support - NC -   SLP eval rec - Dysphagia 2 diet -   use of NIPPV - nightly and PRN day time -   74 y/o male with hx HTN, HLD, DM2, CAD, LISBETH, chronic pain syndrome with intrathecal and abdominal pumps (inactive)  s/p TLC - for acute PE with RV strain - on lovenox BID now for PE -   s/p emp ABX for sepsis - eval for LRTI - completed 12 7 2020  I and O -   keep sat > 88 pct  cardio f/u noted - TTE reviewed -.

## 2020-12-09 NOTE — PROGRESS NOTE ADULT - ASSESSMENT
74 y/o M  PMH HTN, Hld, TIA, spinal stenosis, DM2, Anemia, bed bound PW SOB S/P rapid response called for hypoxia and hypotension.  POCUS with bedside ultrasound during rapid response showed dilated RV, hyperdynamic LV function. Pt admitted to ICU. Current EKG with S1Q3T3 consistent with PE. CTA shows Bilateral pulmonary emboli with CT findings suggesting right heart strain and small-to-moderate right pleural effusion. tPA given.  Bedside echo with signs of RV failure  Pressors now off    Pulmonary Embolism   - CTA showed bilateral pulmonary emboli with CT findings suggesting right heart strain.   - s/p TPA  - EKG with S1Q3T3 consistent with PE. CTA shows Bilateral pulmonary emboli with CT findings suggesting right heart strain and small-to-moderate right pleural effusion. tPA given.    - Bedside echo with signs of RV failure    - Patient off pressors now and is maintaining hemodynamic stability  - Continue FD Lovenox  - Respiratory support with BIPAP as needed.   - Follow pulmonary recommendations     Elevated troponin   - Troponin trend was not consistent with ACS.  Rather, it seems consistent with RV failure in the setting of massive PE with hemodynamic collapse  - Echo showed RV dilation with decreased RV function. Limited echo with no mass associated with tricuspid valve  - Holding Plavix 75 qd  - Continue aspirin 81    HTN  - BP: 147/66 (12-09-20 @ 07:30) (136/71 - 153/84)  - Continue Lisinopril    Hyperlipidemia   - Resume statin when able    - Monitor and replete lytes, keep K>4, Mg>2.  - All other medical needs as per primary team.  - Other cardiovascular workup will depend on clinical course.  - Will continue to follow.    Jose Birmingham, MS FNP, [facility]  Nurse Practitioner- Cardiology   Spectra #[phone]

## 2020-12-09 NOTE — PROGRESS NOTE ADULT - SUBJECTIVE AND OBJECTIVE BOX
Patient is a 75y old  Male who presents with a chief complaint of Resp Distress (09 Dec 2020 10:34)      INTERVAL HPI/OVERNIGHT EVENTS:  no overnight events. pt doesnt like using BIPAP.     MEDICATIONS  (STANDING):  ascorbic acid 500 milliGRAM(s) Oral daily  aspirin enteric coated 81 milliGRAM(s) Oral daily  chlorhexidine 2% Cloths 1 Application(s) Topical <User Schedule>  dextrose 40% Gel 15 Gram(s) Oral once  dextrose 5%. 1000 milliLiter(s) (50 mL/Hr) IV Continuous <Continuous>  dextrose 5%. 1000 milliLiter(s) (100 mL/Hr) IV Continuous <Continuous>  dextrose 50% Injectable 25 Gram(s) IV Push once  dextrose 50% Injectable 12.5 Gram(s) IV Push once  dextrose 50% Injectable 25 Gram(s) IV Push once  enoxaparin Injectable 70 milliGRAM(s) SubCutaneous every 12 hours  glucagon  Injectable 1 milliGRAM(s) IntraMuscular once  insulin lispro (ADMELOG) corrective regimen sliding scale   SubCutaneous Before meals and at bedtime  lisinopril 5 milliGRAM(s) Oral daily  nystatin Powder 1 Application(s) Topical <User Schedule>  pantoprazole    Tablet 40 milliGRAM(s) Oral before breakfast  rOPINIRole 0.25 milliGRAM(s) Oral two times a day  thiamine 100 milliGRAM(s) Oral daily    MEDICATIONS  (PRN):  oxyCODONE    IR 5 milliGRAM(s) Oral every 6 hours PRN Moderate Pain (4 - 6)      Allergies    No Known Allergies    Intolerances        REVIEW OF SYSTEMS:  CONSTITUTIONAL: No fever or chills, +fatigue  HEENT:  No headache, no sore throat  RESPIRATORY: No cough, wheezing, or shortness of breath  CARDIOVASCULAR: No chest pain, palpitations  GASTROINTESTINAL: No abd pain, nausea, vomiting, or diarrhea  GENITOURINARY: No dysuria, frequency, or hematuria  NEUROLOGICAL: no focal weakness or dizziness  MUSCULOSKELETAL: no myalgias     Vital Signs Last 24 Hrs  T(C): 36.7 (09 Dec 2020 07:30), Max: 37.2 (08 Dec 2020 19:34)  T(F): 98.1 (09 Dec 2020 07:30), Max: 99 (08 Dec 2020 19:34)  HR: 75 (09 Dec 2020 07:30) (75 - 90)  BP: 147/66 (09 Dec 2020 07:30) (136/71 - 153/84)  BP(mean): --  RR: 18 (09 Dec 2020 07:30) (18 - 19)  SpO2: 99% (09 Dec 2020 07:30) (93% - 99%)    PHYSICAL EXAM:  GENERAL: NAD  HEENT:  NC, anicteric, moist mucous membranes  CHEST/LUNG: good air entry b/L, on nasal canula  HEART:  RRR, S1, S2  ABDOMEN:  BS+, soft, nontender, nondistended  EXTREMITIES: +UE and LE edema (right UE edema, 2+ pulse, warm and pink extremities)  NERVOUS SYSTEM: answers questions and follows commands appropriately    LABS:                        8.7    8.18  )-----------( 135      ( 09 Dec 2020 08:34 )             30.0     09 Dec 2020 08:34    139    |  97     |  8      ----------------------------<  140    5.1     |  39     |  0.30     Ca    8.6        09 Dec 2020 08:34  Phos  2.3       09 Dec 2020 08:34  Mg     1.9       09 Dec 2020 08:34    TPro  6.2    /  Alb  2.8    /  TBili  0.8    /  DBili  x      /  AST  22     /  ALT  77     /  AlkPhos  65     09 Dec 2020 08:34        CAPILLARY BLOOD GLUCOSE      POCT Blood Glucose.: 150 mg/dL (09 Dec 2020 07:55)  POCT Blood Glucose.: 157 mg/dL (08 Dec 2020 21:44)  POCT Blood Glucose.: 230 mg/dL (08 Dec 2020 16:40)  POCT Blood Glucose.: 240 mg/dL (08 Dec 2020 12:10)      RADIOLOGY & ADDITIONAL TESTS:    Imaging Personally Reviewed:  [ ] YES     Consultant(s) Notes Reviewed:      Care Discussed with Consultants/Other Providers:    Advanced Directives: [ ] DNR  [ ] No feeding tube  [ ] MOLST in chart  [ ] MOLST completed today  [ ] Unknown

## 2020-12-09 NOTE — PROGRESS NOTE ADULT - ASSESSMENT
76 yo male with pmhx spinal stenosis, osteomyelitis of lumbar vertebrae, type 2 DM not on insulin, HTN, CAD, TIA, HLD, LISBETH, Iron deficiency anemia, thiamine deficiency , GERD [facility] admitted for pneumonia now with acute hypoxic hypercapneic resp failure due to bilateral PE, pneumonia and CO2 retention 2/2 LISBETH with NIPPV non compliance.    Acute respiratory failure with hypoxia and hypercapnia   - Sent to ICU from the ED 12/3. Downgraded to tele on 12/7 PM.  - Due to bilateral PE, pneumonia and hypercarbia 2/2 LISBETH  - BIPAP QHS, PRN, Nasal canula   - Cards and pulm consulted, recs appreciated.     Shock, likely cardiogenic  - possible cardiogenic or septic shock given PNA  - Off vasopressors  - f/u blood cultures- 1 of 2 with Staph (?contamin)  - Repeat blood cultures ordered    Metabolic encephalopathy likely due to hypercapnia  - Encephalopathy resolved.   - Bipap qhs/prn. Pt non compliant overnight. Discussed with patient, explained risks of non compliance including resp failure and death. Pt verbalized understanding, states he will try to comply tonight.   - TSH wnl    Bilateral pulmonary embolism with RV strain  - s/p tPA 12/03, s/p eparin drip, now herapeutic lovenox  - f/u echo (RV enlarged) and LE dopplers (pending)  - Right arm edema; likely fluid, mild improved with elevation - advised RN to keep arm elevated - pt on therapeutic lovenox.     Pneumonia, suspect health care associated pneumonia  - IV Zosyn 12/2 to 12/7   - MRSA neg, Legionella neg, Staph aureus +  - Covid negative 12/2 and negative 12/6. COVID antibody neg.    R/O ACS (acute coronary syndrome).   - Pt presenting with sob, elevated troponin and abnormal EKG- no prior for comparison, denies chest pain  - Appr cardio consult  - c/w ASA, statin. Off plavix per cardio recs  - monitor on tele.     Macrocytic Anemia, likely of chronic disease  - Hgb down trending, stool guaiac negative (repeat ordered)  - Ddx broad incluidng anemia of chronic disease, due to phlebotomy  - F/u b12, folate    HTN (hypertension)  - continue lisinopril 5mg daily, /64    History of TIA (transient ischemic attack).   - c/w statin, ASA.     Diabetes mellitus.   - hyperglycemic on presentation.   - hold metformin   - start insulin sliding scale, hypoglycemia protocol  - A1c 6%     Spinal stenosis.  - pt with chronic pain, states hes been bedbound x several weeks  - has pain pump he says is no longer in use   - Urine drug screen negative  - Oxycodone 5mg PRN; not requiring a lot, used once in last 2-3 days.     HLD (hyperlipidemia).   - c/w statin.     GERD (gastroesophageal reflux disease).    - continue protonix, interchange for omeprazole.     Prophylactic measure.   dvt ppx: lovenox bid    Disposition:  - downgraded from ICU recentlyt, now on tele  - PT eval 12/4: rec RAMILA

## 2020-12-09 NOTE — PROGRESS NOTE ADULT - SUBJECTIVE AND OBJECTIVE BOX
[facility] -- Stacey Du, Luis Fernando, [place]wyn, Deep Blanca Savella, Goodger: Office # [phone]    Follow Up:  Massive PE     Subjective/Observations: Patient seen and examined. Patient awake, alert, resting in bed. No complaints of chest pain, dyspnea, palpitations or dizziness. No signs of orthopnea or PND. Tolerating O2 via nasal cannula. C/o back pain.     REVIEW OF SYSTEMS: All review of systems is negative for eye, ENT, GI, , allergic, dermatologic, musculoskeletal and neurologic except as described above    PAST MEDICAL & SURGICAL HISTORY:  Bedbound    H/O osteomyelitis    GERD (gastroesophageal reflux disease)    HLD (hyperlipidemia)    HTN (hypertension)    TIA (transient ischemic attack)    Spinal stenosis    Obesity (BMI 30-39.9)    History of pancreatitis  ~ 1990, 1992    History of ventral hernia    Diabetic peripheral neuropathy    Radial styloid tenosynovitis    Carpal tunnel syndrome on both sides    Spinal stenosis of lumbar region    Anemia    Anemia    Hyperlipidemia    Hypertension    Hypertension    Type 2 diabetes mellitus    Sleep apnea    History of right knee surgery  &quot; Removal of right knee tumor&quot; ,  1959    History of appendectomy  17 years ago      MEDICATIONS  (STANDING):  ascorbic acid 500 milliGRAM(s) Oral daily  aspirin enteric coated 81 milliGRAM(s) Oral daily  chlorhexidine 2% Cloths 1 Application(s) Topical <User Schedule>  dextrose 40% Gel 15 Gram(s) Oral once  dextrose 5%. 1000 milliLiter(s) (50 mL/Hr) IV Continuous <Continuous>  dextrose 5%. 1000 milliLiter(s) (100 mL/Hr) IV Continuous <Continuous>  dextrose 50% Injectable 25 Gram(s) IV Push once  dextrose 50% Injectable 12.5 Gram(s) IV Push once  dextrose 50% Injectable 25 Gram(s) IV Push once  enoxaparin Injectable 70 milliGRAM(s) SubCutaneous every 12 hours  glucagon  Injectable 1 milliGRAM(s) IntraMuscular once  insulin lispro (ADMELOG) corrective regimen sliding scale   SubCutaneous Before meals and at bedtime  lisinopril 5 milliGRAM(s) Oral daily  nystatin Powder 1 Application(s) Topical <User Schedule>  pantoprazole    Tablet 40 milliGRAM(s) Oral before breakfast  rOPINIRole 0.25 milliGRAM(s) Oral two times a day  thiamine 100 milliGRAM(s) Oral daily    MEDICATIONS  (PRN):  oxyCODONE    IR 5 milliGRAM(s) Oral every 6 hours PRN Moderate Pain (4 - 6)    Allergies  No Known Allergies    Vital Signs Last 24 Hrs  T(C): 36.7 (09 Dec 2020 07:30), Max: 37.2 (08 Dec 2020 19:34)  T(F): 98.1 (09 Dec 2020 07:30), Max: 99 (08 Dec 2020 19:34)  HR: 75 (09 Dec 2020 07:30) (75 - 90)  BP: 147/66 (09 Dec 2020 07:30) (136/71 - 153/84)  BP(mean): --  RR: 18 (09 Dec 2020 07:30) (18 - 19)  SpO2: 99% (09 Dec 2020 07:30) (93% - 99%)  I&O's Summary    08 Dec 2020 07:01  -  09 Dec 2020 07:00  --------------------------------------------------------  IN: 840 mL / OUT: 2551 mL / NET: -1711 mL    TELE: SR 70-90s   PHYSICAL EXAM:  Appearance: NAD, no distress, alert, Frail   HEENT: Moist Mucous Membranes, Anicteric  Cardiovascular: Regular rate and rhythm, Normal S1 S2, No JVD, + murmurs, No rubs, gallops or clicks  Respiratory: Non-labored, Clear to auscultation, Diminished at bases No rales, No rhonchi, No wheezing.   Gastrointestinal:  Soft, Non-tender, + BS  Neurologic: Non-focal  Skin: Warm and dry, No visible rashes or ulcers, No ecchymosis, No cyanosis  Musculoskeletal: No clubbing, No cyanosis, No joint swelling/tenderness  Psychiatry: Mood & affect appropriate  Lymph: Rt arm and Lt arm edema     LABS: All Labs Reviewed:                        8.7    8.18  )-----------( 135      ( 09 Dec 2020 08:34 )             30.0                         7.7    6.56  )-----------( 189      ( 08 Dec 2020 08:13 )             26.5                         7.8    7.01  )-----------( 195      ( 07 Dec 2020 05:54 )             25.7     09 Dec 2020 08:34    139    |  97     |  8      ----------------------------<  140    5.1     |  39     |  0.30   08 Dec 2020 08:13    141    |  99     |  10     ----------------------------<  135    4.6     |  40     |  0.29   07 Dec 2020 05:54    141    |  96     |  11     ----------------------------<  130    3.6     |  42     |  0.25     Ca    8.6        09 Dec 2020 08:34  Ca    8.1        08 Dec 2020 08:13  Ca    7.7        07 Dec 2020 05:54  Phos  2.3       09 Dec 2020 08:34  Phos  2.4       08 Dec 2020 08:13  Phos  2.2       07 Dec 2020 05:54  Mg     1.9       09 Dec 2020 08:34  Mg     1.9       08 Dec 2020 08:13  Mg     1.8       07 Dec 2020 05:54    TPro  6.2    /  Alb  2.8    /  TBili  0.8    /  DBili  x      /  AST  22     /  ALT  77     /  AlkPhos  65     09 Dec 2020 08:34  TPro  5.3    /  Alb  2.4    /  TBili  0.6    /  DBili  x      /  AST  16     /  ALT  95     /  AlkPhos  60     08 Dec 2020 08:13  TPro  5.2    /  Alb  2.4    /  TBili  0.7    /  DBili  x      /  AST  20     /  ALT  135    /  AlkPhos  59     07 Dec 2020 05:54  Creatine Kinase, Serum: 47 U/L (12-04-20 @ 09:44)  Troponin I, Serum: .293 ng/mL (12-04-20 @ 09:44)  Creatine Kinase, Serum: 31 U/L (12-03-20 @ 18:38)  D-Dimer Assay, Quantitative: 514 ng/mL DDU (12-02-20 @ 18:46)    12 Lead ECG:   Ventricular Rate 105 BPM  Atrial Rate 105 BPM  P-R Interval 130 ms  QRS Duration 110 ms  Q-T Interval 354 ms  QTC Calculation(Bazett) 467 ms  P Axis 36 degrees  R Axis 33 degrees  T Axis 32 degrees  Diagnosis Line Sinus tachycardia APCs  Incomplete right bundle branch block  ST elevation, consider early repolarization, pericarditis, or injury  ST & T wave abnormality, consider anterior ischemia  Abnormal ECG  Confirmed by Florentin Gould MD ([id number]) on 12/3/2020 5:42:13 PM (12-03-20 @ 09:55)    < from: TTE Echo Limited or F/U (12.04.20 @ 14:07) >  Limited transthoracic echocardiogram done to evaluate tricuspid valve.  Conclusion: There is no significant mass associated with the tricuspid valve. There are redundant and thickened chordae associated with the tricuspid valve. There is mild tricuspid insufficiency. The right ventricle appears enlarged and hypocontractile.    < end of copied text >    < from: CT Angio Chest w/ IV Cont (12.03.20 @ 09:15) >  IMPRESSION: Bilateral pulmonary emboli with CT findings suggesting right heart strain. The right lower lobe is nonaerated likely representative of a combination of atelectasis and pneumonia. Small-to-moderate right pleural effusion. Additional findings discussed above.  < end of copied text >    < from: Xray Chest 1 View- PORTABLE-Urgent (Xray Chest 1 View- PORTABLE-Urgent .) (12.02.20 @ 18:59) >  Impression: Bibasilar atelectasis/airspace disease. Correlate for infection. Small right pleural effusion.  < end of copied text >    < from: CT Abdomen and Pelvis No Cont (12.02.19 @ 01:14) >  IMPRESSION:  No bowel obstruction. Diverticulosis without diverticulitis.  No acute findings on this noncontrast abdomen/pelvic CT to explain the   patient's symptomatology.  < end of copied text >

## 2020-12-09 NOTE — PROGRESS NOTE ADULT - SUBJECTIVE AND OBJECTIVE BOX
Date/Time Patient Seen:  		  Referring MD:   Data Reviewed	       Patient is a 75y old  Male who presents with a chief complaint of Resp Distress (08 Dec 2020 10:13)      Subjective/HPI     PAST MEDICAL & SURGICAL HISTORY:  Bedbound    H/O osteomyelitis    GERD (gastroesophageal reflux disease)    HLD (hyperlipidemia)    HTN (hypertension)    TIA (transient ischemic attack)    Spinal stenosis    Obesity (BMI 30-39.9)    History of pancreatitis  ~ 1990, 1992    History of ventral hernia    Diabetic peripheral neuropathy    Radial styloid tenosynovitis    Carpal tunnel syndrome on both sides    Spinal stenosis of lumbar region    Anemia    Hyperlipidemia    Hypertension    Type 2 diabetes mellitus    Sleep apnea    History of right knee surgery  &quot; Removal of right knee tumor&quot; ,  1959    History of appendectomy  17 years ago          Medication list         MEDICATIONS  (STANDING):  ascorbic acid 500 milliGRAM(s) Oral daily  aspirin enteric coated 81 milliGRAM(s) Oral daily  chlorhexidine 2% Cloths 1 Application(s) Topical <User Schedule>  dextrose 40% Gel 15 Gram(s) Oral once  dextrose 5%. 1000 milliLiter(s) (50 mL/Hr) IV Continuous <Continuous>  dextrose 5%. 1000 milliLiter(s) (100 mL/Hr) IV Continuous <Continuous>  dextrose 50% Injectable 25 Gram(s) IV Push once  dextrose 50% Injectable 12.5 Gram(s) IV Push once  dextrose 50% Injectable 25 Gram(s) IV Push once  enoxaparin Injectable 70 milliGRAM(s) SubCutaneous every 12 hours  glucagon  Injectable 1 milliGRAM(s) IntraMuscular once  insulin lispro (ADMELOG) corrective regimen sliding scale   SubCutaneous Before meals and at bedtime  lisinopril 5 milliGRAM(s) Oral daily  nystatin Powder 1 Application(s) Topical <User Schedule>  pantoprazole    Tablet 40 milliGRAM(s) Oral before breakfast  rOPINIRole 0.25 milliGRAM(s) Oral two times a day  thiamine 100 milliGRAM(s) Oral daily    MEDICATIONS  (PRN):  oxyCODONE    IR 5 milliGRAM(s) Oral every 6 hours PRN Moderate Pain (4 - 6)         Vitals log        ICU Vital Signs Last 24 Hrs  T(C): 36.9 (09 Dec 2020 04:25), Max: 37.2 (08 Dec 2020 19:34)  T(F): 98.5 (09 Dec 2020 04:25), Max: 99 (08 Dec 2020 19:34)  HR: 81 (09 Dec 2020 04:25) (79 - 90)  BP: 153/84 (09 Dec 2020 04:25) (136/71 - 153/84)  BP(mean): --  ABP: --  ABP(mean): --  RR: 18 (09 Dec 2020 04:25) (18 - 19)  SpO2: 99% (09 Dec 2020 04:25) (93% - 99%)           Input and Output:  I&O's Detail    08 Dec 2020 07:01  -  09 Dec 2020 07:00  --------------------------------------------------------  IN:    Oral Fluid: 840 mL  Total IN: 840 mL    OUT:    Incontinent per Condom Catheter (mL): 1200 mL    Voided (mL): 1351 mL  Total OUT: 2551 mL    Total NET: -1711 mL          Lab Data                        7.7    6.56  )-----------( 189      ( 08 Dec 2020 08:13 )             26.5     12-08    141  |  99  |  10  ----------------------------<  135<H>  4.6   |  40<H>  |  0.29<L>    Ca    8.1<L>      08 Dec 2020 08:13  Phos  2.4     12-08  Mg     1.9     12-08    TPro  5.3<L>  /  Alb  2.4<L>  /  TBili  0.6  /  DBili  x   /  AST  16  /  ALT  95<H>  /  AlkPhos  60  12-08            Review of Systems	      Objective     Physical Examination    heart s1s2  lung dc BS  abd soft  on o2 support      Pertinent Lab findings & Imaging      Daron:  NO   Adequate UO     I&O's Detail    08 Dec 2020 07:01  -  09 Dec 2020 07:00  --------------------------------------------------------  IN:    Oral Fluid: 840 mL  Total IN: 840 mL    OUT:    Incontinent per Condom Catheter (mL): 1200 mL    Voided (mL): 1351 mL  Total OUT: 2551 mL    Total NET: -1711 mL               Discussed with:     Cultures:	        Radiology

## 2020-12-09 NOTE — PROGRESS NOTE ADULT - PROBLEM SELECTOR PLAN 1
Heme occult neg - am Hgb noted-   did not use NIPPV last night - education provided -   SLP eval rec - Dysphagia 2 diet -   use of NIPPV - nightly and PRN day time -   76 y/o male with hx HTN, HLD, DM2, CAD, LISBETH, chronic pain syndrome with intrathecal and abdominal pumps (inactive)  s/p TLC - for acute PE with RV strain - on lovenox BID now for PE -   s/p emp ABX for sepsis - eval for LRTI - completed 12 7 2020  I and O -   keep sat > 88 pct  cardio f/u noted - TTE reviewed -.

## 2020-12-10 NOTE — PROGRESS NOTE ADULT - ASSESSMENT
76 yo male with pmhx spinal stenosis, osteomyelitis of lumbar vertebrae, type 2 DM not on insulin, HTN, CAD, TIA, HLD, LISBETH, Iron deficiency anemia, thiamine deficiency , GERD [facility] admitted for pneumonia now with acute hypoxic hypercapneic resp failure due to bilateral PE, pneumonia and CO2 retention 2/2 LISBETH with NIPPV non compliance.    Acute respiratory failure with hypoxia and hypercapnia   - Sent to ICU from the ED 12/3. Downgraded to tele on 12/7 PM.  - Due to bilateral PE, pneumonia and hypercarbia 2/2 LISBETH  - BIPAP QHS, PRN, Nasal canula   - Cards and pulm consulted, recs appreciated.     Shock, likely cardiogenic  - possible cardiogenic or septic shock given PNA  - Off vasopressors  - f/u blood cultures- 1 of 2 with Staph (?contamin)      Metabolic encephalopathy likely due to hypercapnia  - Encephalopathy resolved.   - Bipap qhs/prn. Pt non compliant overnight. Discussed with patient, explained risks of non compliance including resp failure and death. Pt verbalized understanding, states he will try to comply tonight.   - TSH wnl    Bilateral pulmonary embolism with RV strain  - s/p tPA 12/03, s/p eparin drip, now herapeutic lovenox  - f/u echo (RV enlarged) and LE dopplers (pending)  - Right arm edema; likely fluid, mild improved with elevation - advised RN to keep arm elevated - pt on therapeutic lovenox.   - Will speak to cardio regarding switching to OR AC.      Pneumonia, suspect health care associated pneumonia  - IV Zosyn 12/2 to 12/7   - MRSA neg, Legionella neg, Staph aureus +  - Covid negative 12/2 and negative 12/6. COVID antibody neg.    R/O ACS (acute coronary syndrome).   - Pt presenting with sob, elevated troponin and abnormal EKG- no prior for comparison, denies chest pain  - Appr cardio consult  - c/w ASA, statin. Off plavix per cardio recs  - monitor on tele.     Macrocytic Anemia, likely of chronic disease  - Hgb down trending, stool guaiac negative (repeat ordered)  - Ddx broad incluidng anemia of chronic disease, due to phlebotomy  - F/u b12, folate    HTN (hypertension)  - continue lisinopril 5mg daily, /64    History of TIA (transient ischemic attack).   - c/w statin, ASA.     Diabetes mellitus.   - hyperglycemic on presentation.   - hold metformin   - start insulin sliding scale, hypoglycemia protocol  - A1c 6%     Spinal stenosis.  - pt with chronic pain, states hes been bedbound x several weeks  - has pain pump he says is no longer in use   - Urine drug screen negative  - Oxycodone 5mg PRN; not requiring a lot, used once in last 2-3 days.     HLD (hyperlipidemia).   - c/w statin.     GERD (gastroesophageal reflux disease).    - continue protonix, interchange for omeprazole.     Prophylactic measure.   dvt ppx: lovenox bid    Disposition:  - downgraded from ICU recentlyt, now on tele  - PT eval 12/4: rec RAMILA

## 2020-12-10 NOTE — PROGRESS NOTE ADULT - ASSESSMENT
74 y/o M  PMH HTN, Hld, TIA, spinal stenosis, DM2, Anemia, bed bound PW SOB S/P rapid response called for hypoxia and hypotension.  POCUS with bedside ultrasound during rapid response showed dilated RV, hyperdynamic LV function. Pt admitted to ICU. Current EKG with S1Q3T3 consistent with PE. CTA shows Bilateral pulmonary emboli with CT findings suggesting right heart strain and small-to-moderate right pleural effusion. tPA given.  Bedside echo with signs of RV failure  Pressors now off    Pulmonary Embolism   - CTA showed bilateral pulmonary emboli with CT findings suggesting right heart strain.   - s/p TPA  - EKG with S1Q3T3 consistent with PE. CTA shows Bilateral pulmonary emboli with CT findings suggesting right heart strain and small-to-moderate right pleural effusion. tPA given.    - Bedside echo with signs of RV failure    - Patient off pressors now and is maintaining hemodynamic stability  - Continue FD Lovenox  - Respiratory support with BIPAP as needed.   - Follow pulmonary recommendations     Elevated troponin   - Troponin trend was not consistent with ACS.  Rather, it seems consistent with RV failure in the setting of massive PE with hemodynamic collapse  - Echo showed RV dilation with decreased RV function. Limited echo with no mass associated with tricuspid valve  - Holding Plavix 75 qd  - Continue aspirin 81    HTN  - BP: 132/72 (12-10-20 @ 07:27) (131/74 - 178/84)  - Continue Lisinopril    Hyperlipidemia   - Resume statin when able    - Monitor and replete lytes, keep K>4, Mg>2.  - All other medical needs as per primary team.  - Other cardiovascular workup will depend on clinical course.  - Will continue to follow.    Florentin Fabian DNP, ANP-c  Cardiology   Spectra #[phone] 74 y/o M  PMH HTN, Hld, TIA, spinal stenosis, DM2, Anemia, bed bound PW SOB S/P rapid response called for hypoxia and hypotension.  POCUS with bedside ultrasound during rapid response showed dilated RV, hyperdynamic LV function. Pt admitted to ICU. Current EKG with S1Q3T3 consistent with PE. CTA shows Bilateral pulmonary emboli with CT findings suggesting right heart strain and small-to-moderate right pleural effusion. tPA given.  Bedside echo with signs of RV failure  Pressors now off    Pulmonary Embolism   - CTA showed bilateral pulmonary emboli with CT findings suggesting right heart strain.   - s/p TPA  - EKG with S1Q3T3 consistent with PE. CTA shows Bilateral pulmonary emboli with CT findings suggesting right heart strain and small-to-moderate right pleural effusion. tPA given.    - Bedside echo with signs of RV failure    - Patient off pressors now and is maintaining hemodynamic stability  - Continue FD Lovenox, with plan to switch to orgal ac  - Respiratory support with BIPAP as needed.   - Follow pulmonary recommendations     Elevated troponin   - Troponin trend was not consistent with ACS.  Rather, it seems consistent with RV failure in the setting of massive PE with hemodynamic collapse  - Echo showed RV dilation with decreased RV function. Limited echo with no mass associated with tricuspid valve  - Holding Plavix 75 qd  - Continue aspirin 81    HTN  - BP: 132/72 (12-10-20 @ 07:27) (131/74 - 178/84)  - Continue Lisinopril    Hyperlipidemia   - Resume statin when able    - Monitor and replete lytes, keep K>4, Mg>2.  - All other medical needs as per primary team.  - Other cardiovascular workup will depend on clinical course.  - Will continue to follow.    Florentin Fabian DNP, ANP-c  Cardiology   Spectra #[phone]

## 2020-12-10 NOTE — SWALLOW VFSS/MBS ASSESSMENT ADULT - RECOMMENDED CONSISTENCY
1. Mechanical soft solids (dysphagia 2) with thin liquids.  2. Aspiration/reflux precautions.  3. Safe swallowing guidelines: feed only when fully awake/alert, feed only when on NC or room air, position pt upright 90 degrees, administer small bite/sip, complete full mastication of mechanical soft solids, alternate bit/sip, pace meal slowly, and remain upright for 30 minutes post swallow.  4. Continue to monitor respiratory status closely.  5. Ongoing oral care.

## 2020-12-10 NOTE — SWALLOW VFSS/MBS ASSESSMENT ADULT - SLP PERTINENT HISTORY OF CURRENT PROBLEM
Per charting, 76 yo male with pmhx spinal stenosis, osteomyelitis of lumbar vertebrae, type 2 DM not on insulin, HTN, CAD, TIA, HLD, LISBETH, Iron deficiency anemia, thiamine deficiency , GERD presents from [facility] admitted for pneumonia now with acute hypoxic hypercapneic resp failure due to bilateral PE, pneumonia and CO2 retention 2/2 LISBETH with NIPPV non compliance.
Per charting, 74 yo male with pmhx spinal stenosis, osteomyelitis of lumbar vertebrae, type 2 DM not on insulin, HTN, CAD, TIA, HLD, LISBETH, Iron deficiency anemia, thiamine deficiency , GERD presents from [facility] admitted for pneumonia now with acute hypoxic hypercapneic resp failure due to bilateral PE, pneumonia and CO2 retention 2/2 LISBETH with NIPPV non compliance.

## 2020-12-10 NOTE — SWALLOW VFSS/MBS ASSESSMENT ADULT - RECOMMENDED FEEDING/EATING TECHNIQUES
crush medication (when feasible)/provide rest periods between swallows/check mouth frequently for oral residue/pocketing/position upright (90 degrees)/maintain upright posture during/after eating for 30 mins/allow for swallow between intakes/oral hygiene/small sips/bites/alternate food with liquid

## 2020-12-10 NOTE — PROGRESS NOTE ADULT - PROBLEM SELECTOR PLAN 1
Planned for MBS -   Heme occult neg - am Hgb noted-   SLP eval rec - Dysphagia 2 diet - MBS today -   use of NIPPV - nightly and PRN day time - poorly compliant -   74 y/o male with hx HTN, HLD, DM2, CAD, LISBETH, chronic pain syndrome with intrathecal and abdominal pumps (inactive)  s/p TLC - for acute PE with RV strain - on lovenox BID now for PE -   s/p emp ABX for sepsis - eval for LRTI - completed 12 7 2020  I and O -   keep sat > 88 pct  cardio f/u noted - TTE reviewed -.

## 2020-12-10 NOTE — PROGRESS NOTE ADULT - SUBJECTIVE AND OBJECTIVE BOX
[facility] -- Stacey Du, Luis Fernando, [place], Deep Monge Savella  Office # [phone]      Follow Up:    massive PE  Subjective/Observations:   No events overnight resting comfortably in bed.  No complaints of chest pain, dyspnea, or palpitations reported. No signs of orthopnea or PND. Wearing nasal cannula     REVIEW OF SYSTEMS: All other review of systems is negative unless indicated above    PAST MEDICAL & SURGICAL HISTORY:  Bedbound    H/O osteomyelitis    GERD (gastroesophageal reflux disease)    HLD (hyperlipidemia)    HTN (hypertension)    TIA (transient ischemic attack)    Spinal stenosis    Obesity (BMI 30-39.9)    History of pancreatitis  ~ 1990, 1992    History of ventral hernia    Diabetic peripheral neuropathy    Radial styloid tenosynovitis    Carpal tunnel syndrome on both sides    Spinal stenosis of lumbar region    Anemia    Hyperlipidemia    Hypertension    Type 2 diabetes mellitus    Sleep apnea    History of right knee surgery  &quot; Removal of right knee tumor&quot; ,  1959    History of appendectomy  17 years ago        MEDICATIONS  (STANDING):  ascorbic acid 500 milliGRAM(s) Oral daily  aspirin enteric coated 81 milliGRAM(s) Oral daily  chlorhexidine 2% Cloths 1 Application(s) Topical <User Schedule>  dextrose 40% Gel 15 Gram(s) Oral once  dextrose 5%. 1000 milliLiter(s) (50 mL/Hr) IV Continuous <Continuous>  dextrose 5%. 1000 milliLiter(s) (100 mL/Hr) IV Continuous <Continuous>  dextrose 50% Injectable 25 Gram(s) IV Push once  dextrose 50% Injectable 12.5 Gram(s) IV Push once  dextrose 50% Injectable 25 Gram(s) IV Push once  enoxaparin Injectable 70 milliGRAM(s) SubCutaneous every 12 hours  glucagon  Injectable 1 milliGRAM(s) IntraMuscular once  insulin lispro (ADMELOG) corrective regimen sliding scale   SubCutaneous Before meals and at bedtime  lisinopril 5 milliGRAM(s) Oral daily  nystatin Powder 1 Application(s) Topical <User Schedule>  pantoprazole    Tablet 40 milliGRAM(s) Oral before breakfast  rOPINIRole 0.25 milliGRAM(s) Oral two times a day  thiamine 100 milliGRAM(s) Oral daily    MEDICATIONS  (PRN):  oxyCODONE    IR 5 milliGRAM(s) Oral every 6 hours PRN Moderate Pain (4 - 6)      Allergies    No Known Allergies    Intolerances        Vital Signs Last 24 Hrs  T(C): 37.1 (10 Dec 2020 07:27), Max: 37.4 (09 Dec 2020 19:22)  T(F): 98.7 (10 Dec 2020 07:27), Max: 99.3 (09 Dec 2020 19:22)  HR: 96 (10 Dec 2020 07:27) (82 - 102)  BP: 132/72 (10 Dec 2020 07:27) (131/74 - 178/84)  BP(mean): --  RR: 19 (10 Dec 2020 07:27) (18 - 19)  SpO2: 93% (10 Dec 2020 07:27) (93% - 98%)    I&O's Summary    09 Dec 2020 07:01  -  10 Dec 2020 07:00  --------------------------------------------------------  IN: 0 mL / OUT: 500 mL / NET: -500 mL          PHYSICAL EXAM:  TELE: SR w/ triplet  Constitutional: NAD, awake and alert, Frail   HEENT: Moist Mucous Membranes, Anicteric  Pulmonary: Non-labored, breath sounds with Bilaterally diminished at bases  No  wheezes, crackles or rhonchi   Cardiovascular: Regular, S1 and S2 nl, No murmurs, rubs, gallops or clicks  Gastrointestinal: Bowel Sounds present, soft, nontender.   Lymph: No lymphadenopathy. No peripheral edema.  Skin: No visible rashes or ulcers.  Psych:  Mood & affect appropriate    LABS: All Labs Reviewed:                        8.8    10.27 )-----------( 201      ( 10 Dec 2020 07:25 )             30.0                         8.7    8.18  )-----------( 135      ( 09 Dec 2020 08:34 )             30.0                         7.7    6.56  )-----------( 189      ( 08 Dec 2020 08:13 )             26.5     10 Dec 2020 07:25    139    |  100    |  9      ----------------------------<  175    4.2     |  34     |  0.30   09 Dec 2020 08:34    139    |  97     |  8      ----------------------------<  140    5.1     |  39     |  0.30   08 Dec 2020 08:13    141    |  99     |  10     ----------------------------<  135    4.6     |  40     |  0.29     Ca    8.8        10 Dec 2020 07:25  Ca    8.6        09 Dec 2020 08:34  Ca    8.1        08 Dec 2020 08:13  Phos  2.3       09 Dec 2020 08:34  Phos  2.4       08 Dec 2020 08:13  Mg     1.9       09 Dec 2020 08:34  Mg     1.9       08 Dec 2020 08:13    TPro  5.9    /  Alb  2.7    /  TBili  0.8    /  DBili  x      /  AST  15     /  ALT  59     /  AlkPhos  70     10 Dec 2020 07:25  TPro  6.2    /  Alb  2.8    /  TBili  0.8    /  DBili  x      /  AST  22     /  ALT  77     /  AlkPhos  65     09 Dec 2020 08:34  TPro  5.3    /  Alb  2.4    /  TBili  0.6    /  DBili  x      /  AST  16     /  ALT  95     /  AlkPhos  60     08 Dec 2020 08:13        < from: 12 Lead ECG (12.03.20 @ 09:55) >  Ventricular Rate 105 BPM    Atrial Rate 105 BPM    P-R Interval 130 ms    QRS Duration 110 ms    Q-T Interval 354 ms    QTC Calculation(Bazett) 467 ms    P Axis 36 degrees    R Axis 33 degrees    T Axis 32 degrees    Diagnosis Line Sinus tachycardia  APCs  Incomplete right bundle branch block  ST elevation, consider early repolarization, pericarditis, or injury  ST & T wave abnormality, consider anterior ischemia  Abnormal ECG  Confirmed by Luis Fernando LOPEZ, Florentin ([id number]) on 12/3/2020 5:42:13 PM    < end of copied text >  < from: TTE Echo Limited or F/U (12.04.20 @ 14:07) >  EXAM:  ECHO TTE WO CON FU [facility]         PROCEDURE DATE:  12/04/2020        INTERPRETATION:  INDICATION: Pulmonary embolism  Referring MDEB:Dr. Cortez Adame  Blood Pressure 138/71  Weight (kg) :69 kg     Height (cm):168 cm       BSA (sq m): 1.78 sq m  Technician: KL    Limited transthoracic echocardiogram done to evaluate tricuspid valve.      Conclusion: There is no significant mass associated with the tricuspid valve. There are redundant and thickened chordae associated with the tricuspid valve. There is mild tricuspid insufficiency. The right ventricle appears enlarged and hypocontractile.              LIZ MONGE MD; Attending Cardiologist  This document has been electronically signed. Dec  4 2020  2:32PM    < end of copied text >  < from: US Duplex Venous Lower Ext Complete, Bilateral (12.03.20 @ 13:12) >  EXAM:  US DPLX LWR EXT VEINS COMPL BI                            PROCEDURE DATE:  12/03/2020          INTERPRETATION:  CLINICAL INFORMATION: Pulmonary emboli. Assess for lower extremity DVT.    COMPARISON: None available.    TECHNIQUE: Duplex sonography of the BILATERAL LOWER extremity veins with color and spectral Doppler, with and without compression.    FINDINGS:    Right lower extremity. There is normal compressibility of the right common femoral, femoral and popliteal veins.  Doppler examination shows normal spontaneous and phasic flow.  No calf vein thrombosis is detected.    Left lower extremity: Thrombus is identified within the left greater saphenous vein. There is a 9 mm mobile clot identified at the junction of the left greater saphenous vein and common femoral vein. There is no evidence for DVT within the visualized segments of the left femoral vein, left popliteal vein or left posterior tibial vein.    IMPRESSION:  Thrombus left greater saphenous vein.  There is a 9 mm mobile clot identified at the junction of the left greater saphenous vein and common femoral vein.    Findings were discussed with ISABELLA Das 12/3/2020 2:00 PM by Dr. Bhatt with read back confirmation.            RIZWAN BHATT MD; Attending Radiologist  This document has been electronically signed. Dec  3 2020  2:00PM    < end of copied text >  < from: CT Angio Chest w/ IV Cont (12.03.20 @ 09:15) >  EXAM:  CT ANGIO CHEST (W)AW IC                            PROCEDURE DATE:  12/03/2020          INTERPRETATION:  CLINICAL INFORMATION: Shortness of breath. Evaluate for pulmonary embolism, pneumonia, effusion.    COMPARISON: None.    PROCEDURE:  CT Angiography of the Chest.  67 ml of Omnipaque 350 was injected intravenously. 33 ml were discarded.  Sagittal and coronal reformats were performed as well as 3D (MIP) reconstructions.    FINDINGS: Pulmonary emboli are identified within the arterial anatomy of the right upper lobe, right lower lobe, lingular segment of the left upper lobe and left lower lobe.    LUNGS AND AIRWAYS: There is a suggestion for narrowing of the right mainstem bronchus and the right lower lobe bronchial anatomy is poorlydelineated.  Airspace opacities are noted within the posterior right upper lobe. The right lower lobe is nonaerated likely representative of a combination of atelectasis and pneumonia. Scattered opacities are noted within the left lower lobe.  PLEURA: Small to moderate right pleural effusion. No left pleural effusion. No evidence for pneumothorax.  MEDIASTINUM AND ALMAZ: No lymphadenopathy.    HEART: Cardiomegaly. Right ventricular enlargement and flattening of the interventricular septum suggestsright heart strain. No pericardial effusion. Thoracic aortic caliber appears unremarkable.  CHEST WALL AND LOWER NECK: Within normal limits.  VISUALIZED UPPER ABDOMEN: The widening artifact limits evaluation of the upper abdomen. Space-occupying lesions of the bilateral renal parenchyma are unable to be characterized. A small volume of intra-abdominal ascites noted. There is mild elevation of the right hemidiaphragm.  BONES: Chronic appearing deformity of the posterior left thoracic cage noted. Postsurgical changes of the upper lumbar spine noted. There is marked abnormality in appearance of the L1 and L2 vertebral bodies with loss of height and vertebral body endplate irregularity and widening of the L1-L2 intervertebral disc space. Note is made of an indwelling neurostimulator device.    IMPRESSION: Bilateral pulmonary emboli with CT findings suggesting right heart strain. The right lower lobe is nonaerated likely representative of a combination of atelectasis and pneumonia. Small-to-moderate right pleural effusion. Additional findings discussed above.      Findings were discussed with Dr. Das 12/3/2020 10:02 AM by Dr. Bhatt with read back confirmation.            RIZWAN BHATT MD; Attending Radiologist  This document has been electronically signed. Dec  3 2020 10:03AM    < end of copied text >

## 2020-12-10 NOTE — SWALLOW VFSS/MBS ASSESSMENT ADULT - SLP GENERAL OBSERVATIONS
Pt received sitting upright in MBS chair, +lateral view. RN present to monitor pt. Pt was awake and cooperative, on supplemental O2 via NC. Pt able to follow simple commands for purposes of exam. Pt reported back pain post assessment, improved when pt was transferred back to bed. RN present to assist with pt transport to floor.

## 2020-12-10 NOTE — SWALLOW VFSS/MBS ASSESSMENT ADULT - DIAGNOSTIC IMPRESSIONS
1. Mild to moderate oral dysphagia when given regular solid marked by adequate retrieval and containment, prolonged mastication 2/2 incomplete dentition, prolonged bolus cohesion, reduced posterior transfer with delayed oral transit time, and grossly adequate oral clearance post swallow.  2. Mild oral dysphagia when given puree, nectar thick liquids, and thin liquids marked by adequate retrieval and containment, reduced bolus cohesion with liquids resulting in premature spillage to the valleculae, reduced posterior transfer with delayed oral transit time with puree (timely with liquids), and adequate clearance post swallow.  3. Mild pharyngeal dysphagia across all trialed consistencies marked by timely pharyngeal swallow trigger, mildly reduced BOT retraction, adequate hyolaryngeal elevation/excursion, and complete epiglottic retroflexion. There was no penetration and/or aspiration pre/during/post swallow. There was trace BOT and vallecular residue post swallow across all consistencies. 1. Mild to moderate oral dysphagia when given regular solid marked by adequate retrieval and containment, prolonged mastication 2/2 incomplete dentition, prolonged bolus cohesion, reduced posterior transfer with delayed oral transit time, and grossly adequate oral clearance post swallow.  2. Mild oral dysphagia when given puree, nectar thick liquids, and thin liquids marked by adequate retrieval and containment, reduced bolus cohesion with liquids resulting in premature spillage to the valleculae, reduced posterior transfer with delayed oral transit time with puree (timely with liquids), and adequate clearance post swallow.  3. Mild pharyngeal dysphagia across all trialed consistencies marked by timely pharyngeal swallow trigger, mildly reduced BOT retraction, adequate hyolaryngeal elevation/excursion, and complete epiglottic retroflexion. There was no penetration and/or aspiration pre/during/post swallow. There was trace BOT and vallecular residue post swallow across all consistencies.  4. Of note: pt with cervical hardware at level C4-C5.

## 2020-12-10 NOTE — SWALLOW VFSS/MBS ASSESSMENT ADULT - NS SWALLOW VFSS REC ASPIR MON
position upright (90Y)/gurgly voice/throat clearing/cough/pneumonia/change of breathing pattern/fever/oral hygiene/upper respiratory infection

## 2020-12-10 NOTE — PROGRESS NOTE ADULT - SUBJECTIVE AND OBJECTIVE BOX
Date/Time Patient Seen:  		  Referring MD:   Data Reviewed	       Patient is a 75y old  Male who presents with a chief complaint of Resp Distress (09 Dec 2020 11:19)      Subjective/HPI     PAST MEDICAL & SURGICAL HISTORY:  Bedbound    H/O osteomyelitis    GERD (gastroesophageal reflux disease)    HLD (hyperlipidemia)    HTN (hypertension)    TIA (transient ischemic attack)    Spinal stenosis    Obesity (BMI 30-39.9)    History of pancreatitis  ~ 1990, 1992    History of ventral hernia    Diabetic peripheral neuropathy    Radial styloid tenosynovitis    Carpal tunnel syndrome on both sides    Spinal stenosis of lumbar region    Anemia    Hyperlipidemia    Hypertension    Type 2 diabetes mellitus    Sleep apnea    History of right knee surgery  &quot; Removal of right knee tumor&quot; ,  1959    History of appendectomy  17 years ago          Medication list         MEDICATIONS  (STANDING):  ascorbic acid 500 milliGRAM(s) Oral daily  aspirin enteric coated 81 milliGRAM(s) Oral daily  chlorhexidine 2% Cloths 1 Application(s) Topical <User Schedule>  dextrose 40% Gel 15 Gram(s) Oral once  dextrose 5%. 1000 milliLiter(s) (50 mL/Hr) IV Continuous <Continuous>  dextrose 5%. 1000 milliLiter(s) (100 mL/Hr) IV Continuous <Continuous>  dextrose 50% Injectable 25 Gram(s) IV Push once  dextrose 50% Injectable 12.5 Gram(s) IV Push once  dextrose 50% Injectable 25 Gram(s) IV Push once  enoxaparin Injectable 70 milliGRAM(s) SubCutaneous every 12 hours  glucagon  Injectable 1 milliGRAM(s) IntraMuscular once  insulin lispro (ADMELOG) corrective regimen sliding scale   SubCutaneous Before meals and at bedtime  lisinopril 5 milliGRAM(s) Oral daily  nystatin Powder 1 Application(s) Topical <User Schedule>  pantoprazole    Tablet 40 milliGRAM(s) Oral before breakfast  rOPINIRole 0.25 milliGRAM(s) Oral two times a day  thiamine 100 milliGRAM(s) Oral daily    MEDICATIONS  (PRN):  oxyCODONE    IR 5 milliGRAM(s) Oral every 6 hours PRN Moderate Pain (4 - 6)         Vitals log        ICU Vital Signs Last 24 Hrs  T(C): 37.2 (10 Dec 2020 04:22), Max: 37.4 (09 Dec 2020 19:22)  T(F): 99 (10 Dec 2020 04:22), Max: 99.3 (09 Dec 2020 19:22)  HR: 93 (10 Dec 2020 07:26) (82 - 102)  BP: 178/84 (10 Dec 2020 04:22) (131/74 - 178/84)  BP(mean): --  ABP: --  ABP(mean): --  RR: 19 (10 Dec 2020 04:22) (18 - 19)  SpO2: 98% (10 Dec 2020 07:26) (93% - 98%)           Input and Output:  I&O's Detail    09 Dec 2020 07:01  -  10 Dec 2020 07:00  --------------------------------------------------------  IN:  Total IN: 0 mL    OUT:    Incontinent per Condom Catheter (mL): 500 mL  Total OUT: 500 mL    Total NET: -500 mL          Lab Data                        8.8    10.27 )-----------( 201      ( 10 Dec 2020 07:25 )             30.0     12-09    139  |  97  |  8   ----------------------------<  140<H>  5.1   |  39<H>  |  0.30<L>    Ca    8.6      09 Dec 2020 08:34  Phos  2.3     12-09  Mg     1.9     12-09    TPro  6.2  /  Alb  2.8<L>  /  TBili  0.8  /  DBili  x   /  AST  22  /  ALT  77  /  AlkPhos  65  12-09            Review of Systems	      Objective     Physical Examination    heart s1s2  lung dc BS  abd soft      Pertinent Lab findings & Imaging      Neri:  NO   Adequate UO     I&O's Detail    09 Dec 2020 07:01  -  10 Dec 2020 07:00  --------------------------------------------------------  IN:  Total IN: 0 mL    OUT:    Incontinent per Condom Catheter (mL): 500 mL  Total OUT: 500 mL    Total NET: -500 mL               Discussed with:     Cultures:	        Radiology

## 2020-12-10 NOTE — SWALLOW VFSS/MBS ASSESSMENT ADULT - ORAL PHASE
Delayed oral transit time/Reduced anterior - posterior transport Uncontrolled bolus / spillover in hypopharynx/Incomplete tongue to palate contact

## 2020-12-10 NOTE — PROGRESS NOTE ADULT - SUBJECTIVE AND OBJECTIVE BOX
Patient is a 75y old  Male who presents with a chief complaint of Resp Distress (10 Dec 2020 09:35)      INTERVAL HPI/OVERNIGHT EVENTS:    No overnight events.  He used his BIPAP overnight.  MBS was completed.  He can continue current dysphagia diet.     MEDICATIONS  (STANDING):  ascorbic acid 500 milliGRAM(s) Oral daily  aspirin enteric coated 81 milliGRAM(s) Oral daily  chlorhexidine 2% Cloths 1 Application(s) Topical <User Schedule>  dextrose 40% Gel 15 Gram(s) Oral once  dextrose 5%. 1000 milliLiter(s) (50 mL/Hr) IV Continuous <Continuous>  dextrose 5%. 1000 milliLiter(s) (100 mL/Hr) IV Continuous <Continuous>  dextrose 50% Injectable 25 Gram(s) IV Push once  dextrose 50% Injectable 12.5 Gram(s) IV Push once  dextrose 50% Injectable 25 Gram(s) IV Push once  enoxaparin Injectable 70 milliGRAM(s) SubCutaneous every 12 hours  glucagon  Injectable 1 milliGRAM(s) IntraMuscular once  insulin lispro (ADMELOG) corrective regimen sliding scale   SubCutaneous Before meals and at bedtime  lisinopril 5 milliGRAM(s) Oral daily  nystatin Powder 1 Application(s) Topical <User Schedule>  pantoprazole    Tablet 40 milliGRAM(s) Oral before breakfast  rOPINIRole 0.25 milliGRAM(s) Oral two times a day  thiamine 100 milliGRAM(s) Oral daily    MEDICATIONS  (PRN):  oxyCODONE    IR 5 milliGRAM(s) Oral every 6 hours PRN Moderate Pain (4 - 6)      Allergies    No Known Allergies    Intolerances        REVIEW OF SYSTEMS:  CONSTITUTIONAL: No fever or chills, +fatigue  HEENT:  No headache, no sore throat  RESPIRATORY: No cough, wheezing, or shortness of breath  CARDIOVASCULAR: No chest pain, palpitations  GASTROINTESTINAL: No abd pain, nausea, vomiting, or diarrhea  GENITOURINARY: No dysuria, frequency, or hematuria  NEUROLOGICAL: no focal weakness or dizziness  MUSCULOSKELETAL: no myalgias     Vital Signs Last 24 Hrs  T(C): 37.1 (10 Dec 2020 07:27), Max: 37.4 (09 Dec 2020 19:22)  T(F): 98.7 (10 Dec 2020 07:27), Max: 99.3 (09 Dec 2020 19:22)  HR: 96 (10 Dec 2020 07:27) (82 - 102)  BP: 132/72 (10 Dec 2020 07:27) (131/74 - 178/84)  BP(mean): --  RR: 19 (10 Dec 2020 07:27) (18 - 19)  SpO2: 93% (10 Dec 2020 07:27) (93% - 98%)    PHYSICAL EXAM:  GENERAL: NAD  HEENT:  NC, anicteric, moist mucous membranes  CHEST/LUNG: good air entry b/L, on nasal canula  HEART:  RRR, S1, S2  ABDOMEN:  BS+, soft, nontender, nondistended  EXTREMITIES: +UE and LE edema (right UE edema, 2+ pulse, warm and pink extremities)  NERVOUS SYSTEM: answers questions and follows commands appropriately      LABS:                        8.8    10.27 )-----------( 201      ( 10 Dec 2020 07:25 )             30.0     10 Dec 2020 07:25    139    |  100    |  9      ----------------------------<  175    4.2     |  34     |  0.30     Ca    8.8        10 Dec 2020 07:25    TPro  5.9    /  Alb  2.7    /  TBili  0.8    /  DBili  x      /  AST  15     /  ALT  59     /  AlkPhos  70     10 Dec 2020 07:25        CAPILLARY BLOOD GLUCOSE      POCT Blood Glucose.: 192 mg/dL (10 Dec 2020 08:10)  POCT Blood Glucose.: 205 mg/dL (09 Dec 2020 22:47)  POCT Blood Glucose.: 167 mg/dL (09 Dec 2020 16:37)  POCT Blood Glucose.: 234 mg/dL (09 Dec 2020 11:53)      RADIOLOGY & ADDITIONAL TESTS:    Imaging Personally Reviewed:  [ ] YES     Consultant(s) Notes Reviewed:      Care Discussed with Consultants/Other Providers:    Advanced Directives: [ ] DNR  [ ] No feeding tube  [ ] MOLST in chart  [ ] MOLST completed today  [ ] Unknown

## 2020-12-10 NOTE — SWALLOW VFSS/MBS ASSESSMENT ADULT - COMMENTS
Pt scheduled for MBS this AM. Pt did not receive clearance for off monitor order. Pt to be rescheduled tomorrow AM, pending clinical presentation.
Repeat clinical swallow assessment completed 12/8, at which time pt was recommended mechanical soft solids with thin liquids. MBS was recommended to r/o silent aspiration given CT chest imaging.

## 2020-12-11 NOTE — PROGRESS NOTE ADULT - PROBLEM SELECTOR PLAN 1
planned for dc to [facility] with BIPAP - covid pcr neg on 12 10 2020  SLP eval rec - Dysphagia diet - MBS noted -   use of NIPPV - nightly and PRN day time - poorly compliant -   76 y/o male with hx HTN, HLD, DM2, CAD, LISBETH, chronic pain syndrome with intrathecal and abdominal pumps (inactive)  s/p TLC - for acute PE with RV strain - on lovenox BID now for PE -   s/p emp ABX for sepsis - eval for LRTI - completed 12 7 2020  I and O -   keep sat > 88 pct  cardio f/u noted - TTE reviewed -.

## 2020-12-11 NOTE — PROGRESS NOTE ADULT - SUBJECTIVE AND OBJECTIVE BOX
[facility] -- Stacey Du, Bronwyn Gould, Deep Monge Savella, [place]  Office # [phone]    Follow Up:  PE    Subjective/Observations: Awake and alert, on NC.  Denies any respiratory or cardiac discomfort.  However, wants to get out of here.  Uncooperative    REVIEW OF SYSTEMS: All other review of systems is negative unless indicated above  PAST MEDICAL & SURGICAL HISTORY:  Bedbound    H/O osteomyelitis    GERD (gastroesophageal reflux disease)    HLD (hyperlipidemia)    HTN (hypertension)    TIA (transient ischemic attack)    Spinal stenosis    Obesity (BMI 30-39.9)    History of pancreatitis  ~ 1990, 1992    History of ventral hernia    Diabetic peripheral neuropathy    Radial styloid tenosynovitis    Carpal tunnel syndrome on both sides    Spinal stenosis of lumbar region    Anemia    Hyperlipidemia    Hypertension    Type 2 diabetes mellitus    Sleep apnea    History of right knee surgery  &quot; Removal of right knee tumor&quot; ,  1959    History of appendectomy  17 years ago    MEDICATIONS  (STANDING):  ascorbic acid 500 milliGRAM(s) Oral daily  aspirin enteric coated 81 milliGRAM(s) Oral daily  chlorhexidine 2% Cloths 1 Application(s) Topical <User Schedule>  dextrose 40% Gel 15 Gram(s) Oral once  dextrose 5%. 1000 milliLiter(s) (100 mL/Hr) IV Continuous <Continuous>  dextrose 5%. 1000 milliLiter(s) (50 mL/Hr) IV Continuous <Continuous>  dextrose 50% Injectable 25 Gram(s) IV Push once  dextrose 50% Injectable 12.5 Gram(s) IV Push once  dextrose 50% Injectable 25 Gram(s) IV Push once  enoxaparin Injectable 70 milliGRAM(s) SubCutaneous every 12 hours  glucagon  Injectable 1 milliGRAM(s) IntraMuscular once  insulin lispro (ADMELOG) corrective regimen sliding scale   SubCutaneous Before meals and at bedtime  lisinopril 5 milliGRAM(s) Oral daily  nystatin Powder 1 Application(s) Topical <User Schedule>  pantoprazole    Tablet 40 milliGRAM(s) Oral before breakfast  rOPINIRole 0.25 milliGRAM(s) Oral two times a day  thiamine 100 milliGRAM(s) Oral daily    MEDICATIONS  (PRN):  oxyCODONE    IR 5 milliGRAM(s) Oral every 6 hours PRN Moderate Pain (4 - 6)    Allergies    No Known Allergies    Intolerances    Vital Signs Last 24 Hrs  T(C): 37.1 (11 Dec 2020 07:35), Max: 37.5 (10 Dec 2020 16:01)  T(F): 98.8 (11 Dec 2020 07:35), Max: 99.5 (10 Dec 2020 16:01)  HR: 96 (11 Dec 2020 08:18) (85 - 99)  BP: 125/65 (11 Dec 2020 07:35) (125/65 - 152/79)  BP(mean): --  RR: 19 (11 Dec 2020 07:35) (18 - 19)  SpO2: 98% (11 Dec 2020 08:18) (95% - 98%)  I&O's Summary    10 Dec 2020 07:01  -  11 Dec 2020 07:00  --------------------------------------------------------  IN: 0 mL / OUT: 300 mL / NET: -300 mL    PHYSICAL EXAM:  TELE: NSR with PAC's  Constitutional: NAD, awake and alert, well-developed  HEENT: Moist Mucous Membranes, Anicteric  Pulmonary: Non-labored, breath sounds are clear bilaterally, No wheezing, rales or rhonchi  Cardiovascular: Regular, S1 and S2, No murmurs, rubs, gallops or clicks  Gastrointestinal: Bowel Sounds present, soft, nontender.   Lymph: BUE edema, R>L  Skin: No visible rashes or ulcers.  Fading ecchymosis on RUE  Psych:  Mood & affect: Anxious  LABS: All Labs Reviewed:                        9.2    8.87  )-----------( 292      ( 11 Dec 2020 06:28 )             31.4                         8.8    10.27 )-----------( 201      ( 10 Dec 2020 07:25 )             30.0                         8.7    8.18  )-----------( 135      ( 09 Dec 2020 08:34 )             30.0     10 Dec 2020 07:25    139    |  100    |  9      ----------------------------<  175    4.2     |  34     |  0.30   09 Dec 2020 08:34    139    |  97     |  8      ----------------------------<  140    5.1     |  39     |  0.30     Ca    8.8        10 Dec 2020 07:25  Ca    8.6        09 Dec 2020 08:34  Phos  2.3       09 Dec 2020 08:34  Mg     1.9       09 Dec 2020 08:34    TPro  5.9    /  Alb  2.7    /  TBili  0.8    /  DBili  x      /  AST  15     /  ALT  59     /  AlkPhos  70     10 Dec 2020 07:25  TPro  6.2    /  Alb  2.8    /  TBili  0.8    /  DBili  x      /  AST  22     /  ALT  77     /  AlkPhos  65     09 Dec 2020 08:34       EXAM:  ECHO TTE WO CON FU [facility]         PROCEDURE DATE:  12/04/2020        INTERPRETATION:  INDICATION: Pulmonary embolism  Referring M.JOAN:Dr. Cortez Adame  Blood Pressure 138/71  Weight (kg) :69 kg     Height (cm):168 cm       BSA (sq m): 1.78 sq m  Technician:     Limited transthoracic echocardiogram done to evaluate tricuspid valve.    Conclusion: There is no significant mass associated with the tricuspid valve. There are redundant and thickened chordae associated with the tricuspid valve. There is mild tricuspid insufficiency. The right ventricle appears enlarged and hypocontractile.      LIZ MONGE MD; Attending Cardiologist  This document has been electronically signed. Dec  4 2020  2:32PM      EXAM:  CT ANGIO CHEST (W)AW IC                            PROCEDURE DATE:  12/03/2020          INTERPRETATION:  CLINICAL INFORMATION: Shortness of breath. Evaluate for pulmonary embolism, pneumonia, effusion.    COMPARISON: None.    PROCEDURE:  CT Angiography of the Chest.  67 ml of Omnipaque 350 was injected intravenously. 33 ml were discarded.  Sagittal and coronal reformats were performed as well as 3D (MIP) reconstructions.    FINDINGS: Pulmonary emboli are identified within the arterial anatomy of the right upper lobe, right lower lobe, lingular segment of the left upper lobe and left lower lobe.    LUNGS AND AIRWAYS: There is a suggestion for narrowing of the right mainstem bronchus and the right lower lobe bronchial anatomy is poorlydelineated.  Airspace opacities are noted within the posterior right upper lobe. The right lower lobe is nonaerated likely representative of a combination of atelectasis and pneumonia. Scattered opacities are noted within the left lower lobe.  PLEURA: Small to moderate right pleural effusion. No left pleural effusion. No evidence for pneumothorax.  MEDIASTINUM AND ALMAZ: No lymphadenopathy.    HEART: Cardiomegaly. Right ventricular enlargement and flattening of the interventricular septum suggestsright heart strain. No pericardial effusion. Thoracic aortic caliber appears unremarkable.  CHEST WALL AND LOWER NECK: Within normal limits.  VISUALIZED UPPER ABDOMEN: The widening artifact limits evaluation of the upper abdomen. Space-occupying lesions of the bilateral renal parenchyma are unable to be characterized. A small volume of intra-abdominal ascites noted. There is mild elevation of the right hemidiaphragm.  BONES: Chronic appearing deformity of the posterior left thoracic cage noted. Postsurgical changes of the upper lumbar spine noted. There is marked abnormality in appearance of the L1 and L2 vertebral bodies with loss of height and vertebral body endplate irregularity and widening of the L1-L2 intervertebral disc space. Note is made of an indwelling neurostimulator device.    IMPRESSION: Bilateral pulmonary emboli with CT findings suggesting right heart strain. The right lower lobe is nonaerated likely representative of a combination of atelectasis and pneumonia. Small-to-moderate right pleural effusion. Additional findings discussed above.    Findings were discussed with Dr. Das 12/3/2020 10:02 AM by Dr. Bhatt with read back confirmation.    RIZWAN BHATT MD; Attending Radiologist  This document has been electronically signed. Dec  3 2020 10:03AM       Ventricular Rate 105 BPM    Atrial Rate 105 BPM    P-R Interval 130 ms    QRS Duration 110 ms    Q-T Interval 354 ms    QTC Calculation(Bazett) 467 ms    P Axis 36 degrees    R Axis 33 degrees    T Axis 32 degrees    Diagnosis Line Sinus tachycardia  APCs  Incomplete right bundle branch block  ST elevation, consider early repolarization, pericarditis, or injury  ST & T wave abnormality, consider anterior ischemia  Abnormal ECG  Confirmed by Florentin Gould MD ([id number]) on 12/3/2020 5:42:13 PM

## 2020-12-11 NOTE — DISCHARGE NOTE NURSING/CASE MANAGEMENT/SOCIAL WORK - PATIENT PORTAL LINK FT
You can access the FollowMyHealth Patient Portal offered by [facility] by registering at the following website: [email or web address]. By joining [employer]’s FollowMyHealth portal, you will also be able to view your health information using other applications (apps) compatible with our system.

## 2020-12-11 NOTE — PROGRESS NOTE ADULT - ATTENDING COMMENTS
Chart reviewed    Patient seen and examined    Agree with plan as outlined above
I personally saw and examined the patient in detail.  I have spoken to the above provider regarding the assessment and plan of care.  I reviewed the above assessment and plan of care, and agree.  I have made changes in the body of the note where appropriate.
I saw and examined the patient personally. Spoke with above provider regarding this case. I reviewed the above findings completely.  I agree with the above history, physical, and plan which I have edited where appropriate.
Patient seen and examined    Imp:   Massive PE with obstructive shock - s/p tPA 12/3  RLL bacterial pneumonia   Acute hypercapnic resp failure  Hx HTN, HLD, DM2, CAD, LISBETH     Somnolent today, ABG with hypercapnia     Recs  -Continue therapeutic Lovenox   -Mild transaminitis likely due to shock, RV strain - trend   -Somnolence resolved with AVAPS, will continue qhs + prn   -Tolerating po diet   -PT, OOB, fall precautions   -Zosyn x 5-7 days, no further need for vancomycin   -Normoglycemia today, d/c premeal Humalog, continue moderate sliding scale    Stable for medicine     Dr. Jaimes updated family by phone
75M h/o spinal stenosis, TIA, HTN, HLD a/w acute hypoxic respiratory failure and right heart strain 2/2 submassive PE s/p tPA now with improvement.    Neuro: no acute issues, continue home oxy prn   CV: continue home lisinopril   Pulm: continue nocturnal NIPPV for likely LISBETH and NC during the day, wean as able  GI: continue pureed diet, speech eval pending  Renal: no acute issues  ID: monitor off abx    Stable for transfer to floor
Seen/examined. agree with above.
Patient seen and examined    Imp:   Massive PE with obstructive shock - s/p tPA 12/3  RLL bacterial pneumonia   Acute hypercapnic resp failure  Hx HTN, HLD, DM2, CAD, LISBETH     Stable  Awake  Screams frequently - mostly for food and intermittently for pain     Recs  -Add prn oxycodone for pain - responded to it today   -Continue therapeutic Lovenox   -Mild transaminitis likely due to shock, RV strain - improved   -Continue qhs + prn AVAPS  -Tolerating po diet   -Hypertensive - resume lisinopril (home med)   -PT, OOB, fall precautions   -Zosyn x 5 days (until 12/7)  -Hypophosphatemia - replete     Stable for medicine, discharge planning     Dr. Jaimes updated family by phone
Patient seen and examined    Imp:   Massive PE with obstructive shock - s/p tPA 12/3  RLL bacterial pneumonia   Acute hypercapnic resp failure  Hx HTN, HLD, DM2, CAD, LISBETH     Improving, off Levo, AVAPS  AAOx3, demanding food     Bedside POCUS with normal LV sys fn, no septal flattening, RV dilated but smaller in size c/w yesterday and contractile free wall    Recs  -Therapeutic Lovenox   -Density on TV not a mass or clot per cardiology, no need for more tPA   -Mild transaminitis likely due to shock, RV strain - trend   -AVAPS prn   -Start po diet   -PT, OOB, fall precautions   -Zosyn x 5-7 days, no further need for vancomycin   -Seen by speech, change diet to soft with thin liquids   -Worsening hyperglycemia as patient non-compliant with diet, add Lantus and pre-meal Humalog     Stable for medicine     Dr. Babb updated family by phone

## 2020-12-11 NOTE — PROGRESS NOTE ADULT - ASSESSMENT
76 y/o M  PMH HTN, Hld, TIA, spinal stenosis, DM2, Anemia, bed bound PW SOB S/P rapid response called for hypoxia and hypotension.  POCUS with bedside ultrasound during rapid response showed dilated RV, hyperdynamic LV function. Pt admitted to ICU. Current EKG with S1Q3T3 consistent with PE. CTA shows Bilateral pulmonary emboli with CT findings suggesting right heart strain and small-to-moderate right pleural effusion. tPA given.  Bedside echo with signs of RV failure  Pressors now off    Acute Pulmonary Embolism s/p TPA  - CTA showed bilateral pulmonary emboli with CT findings suggesting right heart strain.   - EKG with S1Q3T3 consistent with PE.  CTA shows Bilateral pulmonary emboli with CT findings suggesting right heart strain and small-to-moderate right pleural effusion. tPA given.    - TTE consistent with RV failure    - s/p off pressor.  Remains hemodynamically stable  - Continue FD Lovenox.  to switch to DOAC  - Respiratory support with BIPAP as needed, though, refusing.  Comfortable on RA  - Follow pulmonary recommendations     Elevated troponin   - Troponin trend was not consistent with ACS.  Demand ischemia from RV failure in the setting of massive PE with hemodynamic collapse  - Echo showed RV dilation with decreased RV function. Limited echo with no mass associated with tricuspid valve  - Continue to hold Plavix to avoid triple therapy  - Continue ASA    HTN  - BP remains stable and controlled  - Continue Lisinopril    Hyperlipidemia   - Resume statin when able    - Monitor and replete lytes, keep K>4, Mg>2.    - All other medical needs as per primary team.  - Other cardiovascular workup will depend on clinical course.  Awaiting RAMILA  - Will continue to follow as inpatient    Melita Flores DNP, NP-C  Cardiology   Spectra #[phone]

## 2020-12-11 NOTE — DISCHARGE NOTE NURSING/CASE MANAGEMENT/SOCIAL WORK - NSDCPEPTSTRK_GEN_ALL_CORE
Need for follow up after discharge/Risk factors for stroke/Stroke education booklet/Stroke warning signs and symptoms/Signs and symptoms of stroke/Prescribed medications/Call 911 for stroke/Stroke support groups for patients, families, and friends

## 2020-12-11 NOTE — PROGRESS NOTE ADULT - REASON FOR ADMISSION
Resp Distress

## 2022-04-11 NOTE — ED PROVIDER NOTE - WR ORDER ID 1
Detail Level: Zone Plan: Left [place]\\n\\nThis patient has been treated today with image guided superficial radiation therapy for non-melanoma skin cancer. Written informed consent has been previously obtained from this patient for this treatment. This consent is documented in the patient’s chart. The patient gave verbal consent to continue treatment today. The patient was treated with a specific radiation dose and setup as prescribed by the provider listed on this visit note. A Radiation Therapist performed administration of radiation under supervision of provider. The treatment parameters and cumulative dose are indicated above. Prior to administering the radiation, the patient underwent a verification therapeutic radiology simulation-aided field setting defining relevant normal and abnormal target anatomy and acquiring images with high frequency ultrasound in addition to data necessary developing optimal radiation treatment process for the patient. This process includes verification of the treatment port(s) and proper treatment positioning. All treatment ports were photographed within electronic medical record. The patient’s customized lead blocking along with gross tumor volume and margin was confirmed. Considering superficial radiotherapy is clinical in setup, this requires physician and radiation therapist to clarify location interest being treated against initial images, pathology and patient anatomy. Care was taken ensuring fields treated were geometrically accurate and properly positioned using therapeutic radiology simulation-aided field setting verification per fraction. This process is also utilized to determine if any prescription or setup changes are necessary. These steps are therefore medically necessary ensuring safe and effective administration of radiation. Ongoing therapeutic radiology simulation-aided field setting verification is ordered throughout course of therapy.\\n\\nA high frequency ultrasound image was acquired today for two-dimensional evaluation of the tumor volume and response to treatment, in addition to geometric accuracy of field placement. US depth Is 1.20mm, which is -/+0.01mm in difference from previous imaging. Repop is ++\\n\\nThe field placement and ultrasound imaging, per fraction, is separate and distinct from the initial simulation, and is an important task in providing safe administration of superficial radiation therapy. Physician evaluation of the ultrasound tumor depth will be ongoing throughout the course of treatment, and is deemed medically necessary in order to ensure the efficacy of treatment and any necessary changes. Today’s image was evaluated for determination of continuation of treatment with the current plan or with necessary changes as appropriate. According to provider review of verification therapeutic radiology simulation-aided field setting and imaging, no change is required. Additionally, the use of ultrasound visualization and targeted assessment allows the patient to be able to see their cancer(s) progress, encouraging patient to complete and maintain compliance through full course of radiotherapy. Per Dr. Sharp, continued ultrasound guidance and therapeutic radiology simulation-aided field setting verification per fraction is required for field placement, measurement of tumor depth, progress and acute effect monitoring. [id number] Plan: Right Inferior Lateral Forehead\\n\\nThis patient has been treated today with image guided superficial radiation therapy for non-melanoma skin cancer. Written informed consent has been previously obtained from this patient for this treatment. This consent is documented in the patient’s chart. The patient gave verbal consent to continue treatment today. The patient was treated with a specific radiation dose and setup as prescribed by the provider listed on this visit note. A Radiation Therapist performed administration of radiation under supervision of provider. The treatment parameters and cumulative dose are indicated above. Prior to administering the radiation, the patient underwent a verification therapeutic radiology simulation-aided field setting defining relevant normal and abnormal target anatomy and acquiring images with high frequency ultrasound in addition to data necessary developing optimal radiation treatment process for the patient. This process includes verification of the treatment port(s) and proper treatment positioning. All treatment ports were photographed within electronic medical record. The patient’s customized lead blocking along with gross tumor volume and margin was confirmed. Considering superficial radiotherapy is clinical in setup, this requires physician and radiation therapist to clarify location interest being treated against initial images, pathology and patient anatomy. Care was taken ensuring fields treated were geometrically accurate and properly positioned using therapeutic radiology simulation-aided field setting verification per fraction. This process is also utilized to determine if any prescription or setup changes are necessary. These steps are therefore medically necessary ensuring safe and effective administration of radiation. Ongoing therapeutic radiology simulation-aided field setting verification is ordered throughout course of therapy.\\n\\nA high frequency ultrasound image was acquired today for two-dimensional evaluation of the tumor volume and response to treatment, in addition to geometric accuracy of field placement. US depth Is 1.47mm, which is -/+0.11mm in difference from previous imaging. Repop is +\\n\\nThe field placement and ultrasound imaging, per fraction, is separate and distinct from the initial simulation, and is an important task in providing safe administration of superficial radiation therapy. Physician evaluation of the ultrasound tumor depth will be ongoing throughout the course of treatment, and is deemed medically necessary in order to ensure the efficacy of treatment and any necessary changes. Today’s image was evaluated for determination of continuation of treatment with the current plan or with necessary changes as appropriate. According to provider review of verification therapeutic radiology simulation-aided field setting and imaging, no change is required. Additionally, the use of ultrasound visualization and targeted assessment allows the patient to be able to see their cancer(s) progress, encouraging patient to complete and maintain compliance through full course of radiotherapy. Per Dr. Sharp, continued ultrasound guidance and therapeutic radiology simulation-aided field setting verification per fraction is required for field placement, measurement of tumor depth, progress and acute effect monitoring. Plan: Left Central Forehead\\n\\nThis patient has been treated today with image guided superficial radiation therapy for non-melanoma skin cancer. Written informed consent has been previously obtained from this patient for this treatment. This consent is documented in the patient’s chart. The patient gave verbal consent to continue treatment today. The patient was treated with a specific radiation dose and setup as prescribed by the provider listed on this visit note. A Radiation Therapist performed administration of radiation under supervision of provider. The treatment parameters and cumulative dose are indicated above. Prior to administering the radiation, the patient underwent a verification therapeutic radiology simulation-aided field setting defining relevant normal and abnormal target anatomy and acquiring images with high frequency ultrasound in addition to data necessary developing optimal radiation treatment process for the patient. This process includes verification of the treatment port(s) and proper treatment positioning. All treatment ports were photographed within electronic medical record. The patient’s customized lead blocking along with gross tumor volume and margin was confirmed. Considering superficial radiotherapy is clinical in setup, this requires physician and radiation therapist to clarify location interest being treated against initial images, pathology and patient anatomy. Care was taken ensuring fields treated were geometrically accurate and properly positioned using therapeutic radiology simulation-aided field setting verification per fraction. This process is also utilized to determine if any prescription or setup changes are necessary. These steps are therefore medically necessary ensuring safe and effective administration of radiation. Ongoing therapeutic radiology simulation-aided field setting verification is ordered throughout course of therapy.\\n\\nA high frequency ultrasound image was acquired today for two-dimensional evaluation of the tumor volume and response to treatment, in addition to geometric accuracy of field placement. US depth Is 1.22mm, which is -/+0.05mm in difference from previous imaging. Repop ++\\n\\nThe field placement and ultrasound imaging, per fraction, is separate and distinct from the initial simulation, and is an important task in providing safe administration of superficial radiation therapy. Physician evaluation of the ultrasound tumor depth will be ongoing throughout the course of treatment, and is deemed medically necessary in order to ensure the efficacy of treatment and any necessary changes. Today’s image was evaluated for determination of continuation of treatment with the current plan or with necessary changes as appropriate. According to provider review of verification therapeutic radiology simulation-aided field setting and imaging, no change is required. Additionally, the use of ultrasound visualization and targeted assessment allows the patient to be able to see their cancer(s) progress, encouraging patient to complete and maintain compliance through full course of radiotherapy. Per Dr. Sharp, continued ultrasound guidance and therapeutic radiology simulation-aided field setting verification per fraction is required for field placement, measurement of tumor depth, progress and acute effect monitoring.

## 2023-02-13 NOTE — PATIENT PROFILE ADULT - NSPROGENSOURCEINFO_GEN_A_NUR
Pt is coming in for Unspecified atrial fibrillation. Pt referred by Alanna Jack N.P..    Spoke with patient and confirmed this is pt first time seeing cardiologist Per pt has has had cardiac testing and imaging done at [facility] . Per chart review records are not in patients chart. All cardiac records requested for upcoming NP appointment.  Fax confirmation received. Appointment time, date, and location verified with patient.     Pending outside medical records.   
patient

## 2023-06-22 NOTE — PROGRESS NOTE ADULT - PROVIDER SPECIALTY LIST ADULT
Critical Care Opzelura Counseling:  I discussed with the patient the risks of Opzelura including but not limited to nasopharngitis, bronchitis, ear infection, eosinophila, hives, diarrhea, folliculitis, tonsillitis, and rhinorrhea.  Taken orally, this medication has been linked to serious infections; higher rate of mortality; malignancy and lymphoproliferative disorders; major adverse cardiovascular events; thrombosis; thrombocytopenia, anemia, and neutropenia; and lipid elevations.